# Patient Record
Sex: FEMALE | Race: WHITE | NOT HISPANIC OR LATINO
[De-identification: names, ages, dates, MRNs, and addresses within clinical notes are randomized per-mention and may not be internally consistent; named-entity substitution may affect disease eponyms.]

---

## 2017-09-05 ENCOUNTER — APPOINTMENT (OUTPATIENT)
Dept: PLASTIC SURGERY | Facility: CLINIC | Age: 52
End: 2017-09-05

## 2018-01-18 ENCOUNTER — APPOINTMENT (OUTPATIENT)
Dept: PLASTIC SURGERY | Facility: CLINIC | Age: 53
End: 2018-01-18
Payer: COMMERCIAL

## 2018-01-18 VITALS — BODY MASS INDEX: 24.84 KG/M2 | HEIGHT: 62 IN | WEIGHT: 135 LBS

## 2018-01-18 DIAGNOSIS — Z87.19 PERSONAL HISTORY OF OTHER DISEASES OF THE DIGESTIVE SYSTEM: ICD-10-CM

## 2018-01-18 PROCEDURE — 99203 OFFICE O/P NEW LOW 30 MIN: CPT

## 2018-01-19 ENCOUNTER — OUTPATIENT (OUTPATIENT)
Dept: OUTPATIENT SERVICES | Facility: HOSPITAL | Age: 53
LOS: 1 days | Discharge: HOME | End: 2018-01-19

## 2018-01-19 DIAGNOSIS — K57.20 DIVERTICULITIS OF LARGE INTESTINE WITH PERFORATION AND ABSCESS WITHOUT BLEEDING: ICD-10-CM

## 2018-01-19 DIAGNOSIS — T80.211A BLOODSTREAM INFECTION DUE TO CENTRAL VENOUS CATHETER, INITIAL ENCOUNTER: ICD-10-CM

## 2018-01-19 DIAGNOSIS — M67.449 GANGLION, UNSPECIFIED HAND: ICD-10-CM

## 2018-01-29 ENCOUNTER — OUTPATIENT (OUTPATIENT)
Dept: OUTPATIENT SERVICES | Facility: HOSPITAL | Age: 53
LOS: 1 days | Discharge: HOME | End: 2018-01-29

## 2018-01-29 DIAGNOSIS — M67.941 UNSPECIFIED DISORDER OF SYNOVIUM AND TENDON, RIGHT HAND: ICD-10-CM

## 2018-01-29 DIAGNOSIS — Z01.818 ENCOUNTER FOR OTHER PREPROCEDURAL EXAMINATION: ICD-10-CM

## 2018-02-04 DIAGNOSIS — K57.20 DIVERTICULITIS OF LARGE INTESTINE WITH PERFORATION AND ABSCESS WITHOUT BLEEDING: ICD-10-CM

## 2018-02-04 DIAGNOSIS — T80.211A BLOODSTREAM INFECTION DUE TO CENTRAL VENOUS CATHETER, INITIAL ENCOUNTER: ICD-10-CM

## 2018-02-06 ENCOUNTER — RESULT REVIEW (OUTPATIENT)
Age: 53
End: 2018-02-06

## 2018-02-06 ENCOUNTER — OUTPATIENT (OUTPATIENT)
Dept: OUTPATIENT SERVICES | Facility: HOSPITAL | Age: 53
LOS: 1 days | Discharge: HOME | End: 2018-02-06

## 2018-02-06 ENCOUNTER — APPOINTMENT (OUTPATIENT)
Dept: PLASTIC SURGERY | Facility: CLINIC | Age: 53
End: 2018-02-06
Payer: COMMERCIAL

## 2018-02-06 VITALS
HEIGHT: 62 IN | TEMPERATURE: 98 F | WEIGHT: 134.92 LBS | SYSTOLIC BLOOD PRESSURE: 93 MMHG | OXYGEN SATURATION: 97 % | HEART RATE: 82 BPM | RESPIRATION RATE: 20 BRPM | DIASTOLIC BLOOD PRESSURE: 52 MMHG

## 2018-02-06 VITALS
OXYGEN SATURATION: 97 % | HEART RATE: 76 BPM | SYSTOLIC BLOOD PRESSURE: 105 MMHG | RESPIRATION RATE: 25 BRPM | DIASTOLIC BLOOD PRESSURE: 77 MMHG

## 2018-02-06 DIAGNOSIS — Z98.890 OTHER SPECIFIED POSTPROCEDURAL STATES: Chronic | ICD-10-CM

## 2018-02-06 DIAGNOSIS — L72.3 SEBACEOUS CYST: Chronic | ICD-10-CM

## 2018-02-06 PROCEDURE — 26160 REMOVE TENDON SHEATH LESION: CPT

## 2018-02-06 RX ORDER — SODIUM CHLORIDE 9 MG/ML
1000 INJECTION, SOLUTION INTRAVENOUS
Qty: 0 | Refills: 0 | Status: DISCONTINUED | OUTPATIENT
Start: 2018-02-06 | End: 2018-02-21

## 2018-02-06 RX ORDER — HYDROMORPHONE HYDROCHLORIDE 2 MG/ML
0.25 INJECTION INTRAMUSCULAR; INTRAVENOUS; SUBCUTANEOUS
Qty: 0 | Refills: 0 | Status: DISCONTINUED | OUTPATIENT
Start: 2018-02-06 | End: 2018-02-06

## 2018-02-06 RX ORDER — ACETAMINOPHEN 500 MG
650 TABLET ORAL ONCE
Qty: 0 | Refills: 0 | Status: COMPLETED | OUTPATIENT
Start: 2018-02-06 | End: 2018-02-06

## 2018-02-06 RX ORDER — OXYCODONE AND ACETAMINOPHEN 5; 325 MG/1; MG/1
1 TABLET ORAL ONCE
Qty: 0 | Refills: 0 | Status: DISCONTINUED | OUTPATIENT
Start: 2018-02-06 | End: 2018-02-06

## 2018-02-06 RX ADMIN — Medication 650 MILLIGRAM(S): at 13:57

## 2018-02-06 NOTE — BRIEF OPERATIVE NOTE - PROCEDURE
<<-----Click on this checkbox to enter Procedure Excision of mucous cyst of right little finger  02/06/2018    Active  CPATE1

## 2018-02-06 NOTE — ASU DISCHARGE PLAN (ADULT/PEDIATRIC). - NOTIFY
Pain not relieved by Medications/Inability to Tolerate Liquids or Foods/Fever greater than 101/Bleeding that does not stop/Unable to Urinate

## 2018-02-06 NOTE — ASU DISCHARGE PLAN (ADULT/PEDIATRIC). - NURSING INSTRUCTIONS
keep hand elevated.  keep dressing dry. take medications as directed.  Make f/u appointment with surgeon

## 2018-02-06 NOTE — ASU DISCHARGE PLAN (ADULT/PEDIATRIC). - FOLLOWUP APPOINTMENT CLINIC/PHYSICIAN
2/15/18 12:00pm [address] [facility], [address]  [facility] [address]

## 2018-02-06 NOTE — ASU DISCHARGE PLAN (ADULT/PEDIATRIC). - SPECIAL INSTRUCTIONS
Keep right hand elevated at all times to avoid swelling. Take Tylenol as needed for pain. If not well controlled, take Tramadol. Avoid Advil/Aleve/Motrin as it may increase bleeding/bruising. Do not get bandage wet. It will be changed at your first post-operative visit in 1 week.

## 2018-02-09 DIAGNOSIS — M67.441 GANGLION, RIGHT HAND: ICD-10-CM

## 2018-02-09 DIAGNOSIS — Z88.1 ALLERGY STATUS TO OTHER ANTIBIOTIC AGENTS STATUS: ICD-10-CM

## 2018-02-09 DIAGNOSIS — Z88.8 ALLERGY STATUS TO OTHER DRUGS, MEDICAMENTS AND BIOLOGICAL SUBSTANCES STATUS: ICD-10-CM

## 2018-02-09 DIAGNOSIS — Z88.0 ALLERGY STATUS TO PENICILLIN: ICD-10-CM

## 2018-02-09 LAB — SURGICAL PATHOLOGY STUDY: SIGNIFICANT CHANGE UP

## 2018-02-14 ENCOUNTER — APPOINTMENT (OUTPATIENT)
Dept: PLASTIC SURGERY | Facility: CLINIC | Age: 53
End: 2018-02-14
Payer: COMMERCIAL

## 2018-02-14 PROCEDURE — 99024 POSTOP FOLLOW-UP VISIT: CPT

## 2018-02-14 RX ORDER — MULTIVITAMIN
TABLET ORAL
Refills: 0 | Status: ACTIVE | COMMUNITY

## 2018-02-22 ENCOUNTER — APPOINTMENT (OUTPATIENT)
Dept: PLASTIC SURGERY | Facility: CLINIC | Age: 53
End: 2018-02-22
Payer: COMMERCIAL

## 2018-02-22 PROCEDURE — 99024 POSTOP FOLLOW-UP VISIT: CPT

## 2018-02-26 ENCOUNTER — APPOINTMENT (OUTPATIENT)
Dept: PLASTIC SURGERY | Facility: CLINIC | Age: 53
End: 2018-02-26
Payer: COMMERCIAL

## 2018-02-26 PROCEDURE — 99024 POSTOP FOLLOW-UP VISIT: CPT

## 2018-05-23 ENCOUNTER — APPOINTMENT (OUTPATIENT)
Dept: PLASTIC SURGERY | Facility: CLINIC | Age: 53
End: 2018-05-23

## 2018-05-31 ENCOUNTER — APPOINTMENT (OUTPATIENT)
Dept: PLASTIC SURGERY | Facility: CLINIC | Age: 53
End: 2018-05-31
Payer: COMMERCIAL

## 2018-05-31 DIAGNOSIS — M67.449 GANGLION, UNSPECIFIED HAND: ICD-10-CM

## 2018-05-31 PROCEDURE — 99213 OFFICE O/P EST LOW 20 MIN: CPT

## 2018-08-22 PROBLEM — K21.9 GASTRO-ESOPHAGEAL REFLUX DISEASE WITHOUT ESOPHAGITIS: Chronic | Status: ACTIVE | Noted: 2018-02-06

## 2018-08-22 PROBLEM — K57.92 DIVERTICULITIS OF INTESTINE, PART UNSPECIFIED, WITHOUT PERFORATION OR ABSCESS WITHOUT BLEEDING: Chronic | Status: ACTIVE | Noted: 2018-02-06

## 2018-08-22 PROBLEM — M19.90 UNSPECIFIED OSTEOARTHRITIS, UNSPECIFIED SITE: Chronic | Status: ACTIVE | Noted: 2018-02-06

## 2018-08-29 ENCOUNTER — APPOINTMENT (OUTPATIENT)
Dept: PLASTIC SURGERY | Facility: CLINIC | Age: 53
End: 2018-08-29

## 2019-03-20 ENCOUNTER — FORM ENCOUNTER (OUTPATIENT)
Age: 54
End: 2019-03-20

## 2019-09-11 ENCOUNTER — FORM ENCOUNTER (OUTPATIENT)
Age: 54
End: 2019-09-11

## 2019-09-16 ENCOUNTER — FORM ENCOUNTER (OUTPATIENT)
Age: 54
End: 2019-09-16

## 2020-09-21 ENCOUNTER — FORM ENCOUNTER (OUTPATIENT)
Age: 55
End: 2020-09-21

## 2020-09-23 ENCOUNTER — FORM ENCOUNTER (OUTPATIENT)
Age: 55
End: 2020-09-23

## 2021-05-11 DIAGNOSIS — A63.0 ANOGENITAL (VENEREAL) WARTS: ICD-10-CM

## 2021-05-11 DIAGNOSIS — Z87.42 PERSONAL HISTORY OF OTHER DISEASES OF THE FEMALE GENITAL TRACT: ICD-10-CM

## 2021-05-11 DIAGNOSIS — Z86.03 PERSONAL HISTORY OF NEOPLASM OF UNCERTAIN BEHAVIOR: ICD-10-CM

## 2021-05-11 DIAGNOSIS — Z91.89 OTHER SPECIFIED PERSONAL RISK FACTORS, NOT ELSEWHERE CLASSIFIED: ICD-10-CM

## 2021-05-11 DIAGNOSIS — Z63.5 DISRUPTION OF FAMILY BY SEPARATION AND DIVORCE: ICD-10-CM

## 2021-05-11 DIAGNOSIS — Z87.448 PERSONAL HISTORY OF OTHER DISEASES OF URINARY SYSTEM: ICD-10-CM

## 2021-05-11 DIAGNOSIS — D49.59 NEOPLASM UNSPECIFIED BEHAVIOR OF OTHER GENITOURINARY ORGAN: ICD-10-CM

## 2021-05-11 DIAGNOSIS — Z80.41 FAMILY HISTORY OF MALIGNANT NEOPLASM OF OVARY: ICD-10-CM

## 2021-05-11 DIAGNOSIS — N87.0 MILD CERVICAL DYSPLASIA: ICD-10-CM

## 2021-05-11 DIAGNOSIS — Z72.51 HIGH RISK HETEROSEXUAL BEHAVIOR: ICD-10-CM

## 2021-05-11 DIAGNOSIS — N76.2 ACUTE VULVITIS: ICD-10-CM

## 2021-05-11 DIAGNOSIS — K63.1 PERFORATION OF INTESTINE (NONTRAUMATIC): ICD-10-CM

## 2021-05-11 DIAGNOSIS — Z78.9 OTHER SPECIFIED HEALTH STATUS: ICD-10-CM

## 2021-05-11 DIAGNOSIS — Z87.891 PERSONAL HISTORY OF NICOTINE DEPENDENCE: ICD-10-CM

## 2021-05-11 LAB — CYTOLOGY CVX/VAG DOC THIN PREP: NEGATIVE

## 2021-05-11 SDOH — SOCIAL STABILITY - SOCIAL INSECURITY: DISRUPTION OF FAMILY BY SEPARATION AND DIVORCE: Z63.5

## 2021-05-13 ENCOUNTER — APPOINTMENT (OUTPATIENT)
Dept: OBGYN | Facility: CLINIC | Age: 56
End: 2021-05-13
Payer: COMMERCIAL

## 2021-05-13 VITALS — SYSTOLIC BLOOD PRESSURE: 112 MMHG | DIASTOLIC BLOOD PRESSURE: 83 MMHG

## 2021-05-13 VITALS — TEMPERATURE: 97.7 F | WEIGHT: 140 LBS | HEIGHT: 62 IN | BODY MASS INDEX: 25.76 KG/M2

## 2021-05-13 DIAGNOSIS — N90.7 VULVAR CYST: ICD-10-CM

## 2021-05-13 PROCEDURE — 99213 OFFICE O/P EST LOW 20 MIN: CPT

## 2021-05-13 PROCEDURE — 99072 ADDL SUPL MATRL&STAF TM PHE: CPT

## 2021-05-13 NOTE — PHYSICAL EXAM
[Labia Majora] : normal [Labia Minora] : normal [Normal] : normal [FreeTextEntry1] : small epidermoid cyst of left labia majora  1-2mm

## 2021-05-13 NOTE — HISTORY OF PRESENT ILLNESS
[FreeTextEntry1] : pt feels "bump" in genital area, has hx of condyloma and wants to make sure she doesn’t have new lesion [Currently In Menopause] : currently in menopause [Currently Active] : currently active [Men] : men [No] : No [FreeTextEntry2] : infrequently

## 2021-08-31 ENCOUNTER — NON-APPOINTMENT (OUTPATIENT)
Age: 56
End: 2021-08-31

## 2021-09-28 ENCOUNTER — NON-APPOINTMENT (OUTPATIENT)
Age: 56
End: 2021-09-28

## 2021-09-28 ENCOUNTER — APPOINTMENT (OUTPATIENT)
Dept: OBGYN | Facility: CLINIC | Age: 56
End: 2021-09-28

## 2021-10-04 ENCOUNTER — NON-APPOINTMENT (OUTPATIENT)
Age: 56
End: 2021-10-04

## 2021-10-04 ENCOUNTER — APPOINTMENT (OUTPATIENT)
Dept: OBGYN | Facility: CLINIC | Age: 56
End: 2021-10-04
Payer: COMMERCIAL

## 2021-10-04 VITALS
BODY MASS INDEX: 25.76 KG/M2 | HEIGHT: 62 IN | HEART RATE: 83 BPM | SYSTOLIC BLOOD PRESSURE: 121 MMHG | WEIGHT: 140 LBS | DIASTOLIC BLOOD PRESSURE: 80 MMHG | TEMPERATURE: 98.1 F

## 2021-10-04 DIAGNOSIS — Z01.419 ENCOUNTER FOR GYNECOLOGICAL EXAMINATION (GENERAL) (ROUTINE) W/OUT ABNORMAL FINDINGS: ICD-10-CM

## 2021-10-04 LAB
BILIRUB UR QL STRIP: NORMAL
CLARITY UR: CLEAR
COLLECTION METHOD: NORMAL
GLUCOSE UR-MCNC: NORMAL
HCG UR QL: 0.2 EU/DL
HGB UR QL STRIP.AUTO: NORMAL
KETONES UR-MCNC: NORMAL
LEUKOCYTE ESTERASE UR QL STRIP: NORMAL
NITRITE UR QL STRIP: NORMAL
PH UR STRIP: 5.5
PROT UR STRIP-MCNC: NORMAL
SP GR UR STRIP: 1.03

## 2021-10-04 PROCEDURE — 81003 URINALYSIS AUTO W/O SCOPE: CPT | Mod: QW

## 2021-10-04 PROCEDURE — 99396 PREV VISIT EST AGE 40-64: CPT

## 2021-10-04 NOTE — PHYSICAL EXAM
[Examination Of The Breasts] : a normal appearance [No Masses] : no breast masses were palpable [Labia Majora] : normal [Labia Minora] : normal [Normal] : normal [Retroversion] : retroverted [Uterine Adnexae] : normal

## 2021-10-04 NOTE — HISTORY OF PRESENT ILLNESS
[Menopause Age: ____] : age at menopause was [unfilled] [postmenopausal] : postmenopausal [N] : Patient does not use contraception [Y] : Patient is sexually active [Currently In Menopause] : currently in menopause [Yes] : Patient has concerns regarding sex [FreeTextEntry1] : 2019

## 2021-10-08 ENCOUNTER — NON-APPOINTMENT (OUTPATIENT)
Age: 56
End: 2021-10-08

## 2021-10-12 LAB
C TRACH RRNA SPEC QL NAA+PROBE: NOT DETECTED
HPV HIGH+LOW RISK DNA PNL CVX: NOT DETECTED
N GONORRHOEA RRNA SPEC QL NAA+PROBE: NOT DETECTED
SOURCE AMPLIFICATION: NORMAL
SOURCE AMPLIFICATION: NORMAL
T VAGINALIS RRNA SPEC QL NAA+PROBE: NOT DETECTED

## 2021-10-13 ENCOUNTER — NON-APPOINTMENT (OUTPATIENT)
Age: 56
End: 2021-10-13

## 2021-10-14 LAB — CYTOLOGY CVX/VAG DOC THIN PREP: NORMAL

## 2021-10-22 ENCOUNTER — NON-APPOINTMENT (OUTPATIENT)
Age: 56
End: 2021-10-22

## 2022-02-07 ENCOUNTER — APPOINTMENT (OUTPATIENT)
Dept: OBGYN | Facility: CLINIC | Age: 57
End: 2022-02-07
Payer: COMMERCIAL

## 2022-02-07 ENCOUNTER — NON-APPOINTMENT (OUTPATIENT)
Age: 57
End: 2022-02-07

## 2022-02-07 VITALS
SYSTOLIC BLOOD PRESSURE: 114 MMHG | HEART RATE: 94 BPM | BODY MASS INDEX: 25.76 KG/M2 | HEIGHT: 62 IN | WEIGHT: 140 LBS | DIASTOLIC BLOOD PRESSURE: 83 MMHG

## 2022-02-07 LAB
BILIRUB UR QL STRIP: NORMAL
CLARITY UR: CLEAR
COLLECTION METHOD: NORMAL
GLUCOSE UR-MCNC: NORMAL
HCG UR QL: 0.2 EU/DL
HGB UR QL STRIP.AUTO: NORMAL
KETONES UR-MCNC: NORMAL
LEUKOCYTE ESTERASE UR QL STRIP: NORMAL
NITRITE UR QL STRIP: NORMAL
PH UR STRIP: 7
PROT UR STRIP-MCNC: 100
SP GR UR STRIP: 1.03

## 2022-02-07 PROCEDURE — 81003 URINALYSIS AUTO W/O SCOPE: CPT | Mod: QW

## 2022-02-07 PROCEDURE — 99213 OFFICE O/P EST LOW 20 MIN: CPT

## 2022-02-07 NOTE — PHYSICAL EXAM
[Chaperone Present] : A chaperone was present in the examining room during all aspects of the physical examination [Normal] : uterus [No Bleeding] : there was no active vaginal bleeding [Uterine Adnexae] : were not tender and not enlarged

## 2022-02-07 NOTE — HISTORY OF PRESENT ILLNESS
[Good] : being in good health [Postmenopausal] : is postmenopausal [Currently In Menopause] : currently in menopause [Experiencing Menopausal Sxs] : not experiencing menopausal symptoms [Sexually Active] : is not sexually active

## 2022-02-07 NOTE — CHIEF COMPLAINT
[Urgent Visit] : Urgent Visit [FreeTextEntry1] : pt started to feel symptoms of UTI, became worse today\par saw some blood in urine, (pink) after urinating

## 2022-02-08 ENCOUNTER — NON-APPOINTMENT (OUTPATIENT)
Age: 57
End: 2022-02-08

## 2022-02-13 LAB
APPEARANCE: ABNORMAL
BACTERIA UR CULT: ABNORMAL
BACTERIA: ABNORMAL
BILIRUBIN URINE: NEGATIVE
BLOOD URINE: ABNORMAL
CALCIUM OXALATE CRYSTALS: ABNORMAL
COLOR: YELLOW
GLUCOSE QUALITATIVE U: NEGATIVE
HYALINE CASTS: 0 /LPF
KETONES URINE: NEGATIVE
LEUKOCYTE ESTERASE URINE: ABNORMAL
MICROSCOPIC-UA: NORMAL
NITRITE URINE: NEGATIVE
PH URINE: 9
PROTEIN URINE: ABNORMAL
RED BLOOD CELLS URINE: 2 /HPF
SPECIFIC GRAVITY URINE: 1.03
SQUAMOUS EPITHELIAL CELLS: 1 /HPF
TRIPLE PHOSPHATE CRYSTALS: ABNORMAL
UROBILINOGEN URINE: NORMAL
WHITE BLOOD CELLS URINE: 20 /HPF

## 2022-02-14 ENCOUNTER — NON-APPOINTMENT (OUTPATIENT)
Age: 57
End: 2022-02-14

## 2022-02-16 ENCOUNTER — NON-APPOINTMENT (OUTPATIENT)
Age: 57
End: 2022-02-16

## 2022-02-17 ENCOUNTER — APPOINTMENT (OUTPATIENT)
Dept: OBGYN | Facility: CLINIC | Age: 57
End: 2022-02-17
Payer: COMMERCIAL

## 2022-02-17 VITALS — TEMPERATURE: 97.9 F | BODY MASS INDEX: 26.68 KG/M2 | HEIGHT: 62 IN | WEIGHT: 145 LBS

## 2022-02-17 VITALS — DIASTOLIC BLOOD PRESSURE: 81 MMHG | HEART RATE: 98 BPM | SYSTOLIC BLOOD PRESSURE: 103 MMHG

## 2022-02-17 LAB
BILIRUB UR QL STRIP: NORMAL
CLARITY UR: NORMAL
COLLECTION METHOD: NORMAL
GLUCOSE UR-MCNC: NORMAL
HCG UR QL: 0.2 EU/DL
HGB UR QL STRIP.AUTO: NORMAL
KETONES UR-MCNC: NORMAL
LEUKOCYTE ESTERASE UR QL STRIP: NORMAL
NITRITE UR QL STRIP: NORMAL
PH UR STRIP: 6
PROT UR STRIP-MCNC: NORMAL
SP GR UR STRIP: 1.02

## 2022-02-17 PROCEDURE — 99213 OFFICE O/P EST LOW 20 MIN: CPT | Mod: 25

## 2022-02-17 PROCEDURE — 81003 URINALYSIS AUTO W/O SCOPE: CPT | Mod: QW

## 2022-02-17 PROCEDURE — 76830 TRANSVAGINAL US NON-OB: CPT

## 2022-02-17 NOTE — DISCUSSION/SUMMARY
[FreeTextEntry1] : finish antibiotics for UTI\par ca125 less than 1 yr ago was wnl, pt is very nervous about ovarian cancer because of her mothers hx \par

## 2022-02-17 NOTE — HISTORY OF PRESENT ILLNESS
[FreeTextEntry1] : pt presents with pelvic pain since monday, she was seen on this same day , diagnosed with UTI\par (+) culture, currently on day 4 of medication\par pt stating that the pain feels different and is more in middle of abdomen\par has hx of "perforated colon" 2012\par has an appointment to see Dr Williamson today at 7pm\par

## 2022-02-17 NOTE — PHYSICAL EXAM
[Chaperone Present] : A chaperone was present in the examining room during all aspects of the physical examination [Appropriately responsive] : appropriately responsive [Alert] : alert [No Acute Distress] : no acute distress [Soft] : soft [Non-distended] : non-distended [No Lesions] : no lesions [No Mass] : no mass [Oriented x3] : oriented x3 [FreeTextEntry7] : tender to deep palpation rt middle quad, no rebound [Vulvar Atrophy] : vulvar atrophy [Labia Majora] : normal [Labia Minora] : normal [Atrophy] : atrophy [Normal] : normal [Retroversion] : retroverted [Uterine Adnexae] : normal

## 2022-02-17 NOTE — PROCEDURE
[Pelvic Pain] : pelvic pain [Transvaginal Ultrasound] : transvaginal ultrasound [Retroverted] : retroverted [FreeTextEntry5] : 16.34cc  vol  lining 1mm [FreeTextEntry7] : 1.4 x 0.9 x 0.8cm [FreeTextEntry8] : 1.7 x 0.9x1.2cm

## 2022-04-26 ENCOUNTER — NON-APPOINTMENT (OUTPATIENT)
Age: 57
End: 2022-04-26

## 2022-05-02 ENCOUNTER — APPOINTMENT (OUTPATIENT)
Dept: OBGYN | Facility: CLINIC | Age: 57
End: 2022-05-02
Payer: COMMERCIAL

## 2022-05-02 VITALS
DIASTOLIC BLOOD PRESSURE: 75 MMHG | BODY MASS INDEX: 25.76 KG/M2 | SYSTOLIC BLOOD PRESSURE: 116 MMHG | WEIGHT: 140 LBS | HEIGHT: 62 IN

## 2022-05-02 DIAGNOSIS — N81.11 CYSTOCELE, MIDLINE: ICD-10-CM

## 2022-05-02 DIAGNOSIS — N39.0 URINARY TRACT INFECTION, SITE NOT SPECIFIED: ICD-10-CM

## 2022-05-02 PROCEDURE — 81003 URINALYSIS AUTO W/O SCOPE: CPT | Mod: QW

## 2022-05-02 PROCEDURE — 99213 OFFICE O/P EST LOW 20 MIN: CPT

## 2022-05-02 NOTE — HISTORY OF PRESENT ILLNESS
[postmenopausal] : postmenopausal [N] : Patient does not use contraception [Y] : Patient is sexually active [Currently In Menopause] : currently in menopause [Menopause Age: ____] : age at menopause was [unfilled] [FreeTextEntry1] : 2019 [Yes] : Patient has concerns regarding sex [Previously active] : previously active

## 2022-05-02 NOTE — DISCUSSION/SUMMARY
[FreeTextEntry1] : discussed findings of genital prolapse, pt doesn’t want to have a surgical procedure
ST 2xs/week

## 2022-05-02 NOTE — PHYSICAL EXAM
[Chaperone Present] : A chaperone was present in the examining room during all aspects of the physical examination [Appropriately responsive] : appropriately responsive [Alert] : alert [No Acute Distress] : no acute distress [Soft] : soft [Non-tender] : non-tender [Non-distended] : non-distended [Oriented x3] : oriented x3 [Labia Majora] : normal [Labia Minora] : normal [Cystocele] : a cystocele [Uterine Prolapse] : uterine prolapse [No Bleeding] : There was no active vaginal bleeding [Normal] : normal [Uterine Adnexae] : normal

## 2022-05-03 LAB
BILIRUB UR QL STRIP: NEGATIVE
CLARITY UR: CLEAR
COLLECTION METHOD: NORMAL
GLUCOSE UR-MCNC: NEGATIVE
HCG UR QL: 0.2 EU/DL
HGB UR QL STRIP.AUTO: NEGATIVE
KETONES UR-MCNC: NEGATIVE
LEUKOCYTE ESTERASE UR QL STRIP: NORMAL
NITRITE UR QL STRIP: POSITIVE
PH UR STRIP: 5.5
PROT UR STRIP-MCNC: NEGATIVE
SP GR UR STRIP: 1.03

## 2022-05-09 ENCOUNTER — NON-APPOINTMENT (OUTPATIENT)
Age: 57
End: 2022-05-09

## 2022-05-09 LAB — BACTERIA UR CULT: ABNORMAL

## 2022-11-07 DIAGNOSIS — Z12.39 ENCOUNTER FOR OTHER SCREENING FOR MALIGNANT NEOPLASM OF BREAST: ICD-10-CM

## 2022-11-07 DIAGNOSIS — R92.2 INCONCLUSIVE MAMMOGRAM: ICD-10-CM

## 2022-11-14 ENCOUNTER — NON-APPOINTMENT (OUTPATIENT)
Age: 57
End: 2022-11-14

## 2022-11-14 ENCOUNTER — APPOINTMENT (OUTPATIENT)
Dept: OBGYN | Facility: CLINIC | Age: 57
End: 2022-11-14

## 2022-11-14 VITALS
SYSTOLIC BLOOD PRESSURE: 124 MMHG | BODY MASS INDEX: 25.76 KG/M2 | DIASTOLIC BLOOD PRESSURE: 79 MMHG | WEIGHT: 140 LBS | HEART RATE: 75 BPM | HEIGHT: 62 IN

## 2022-11-14 LAB
BILIRUB UR QL STRIP: NORMAL
CLARITY UR: CLEAR
COLLECTION METHOD: NORMAL
GLUCOSE UR-MCNC: NORMAL
HCG UR QL: 0.2 EU/DL
HGB UR QL STRIP.AUTO: NORMAL
KETONES UR-MCNC: NORMAL
LEUKOCYTE ESTERASE UR QL STRIP: NORMAL
NITRITE UR QL STRIP: NORMAL
PH UR STRIP: 5
PROT UR STRIP-MCNC: NORMAL
SP GR UR STRIP: 1.03

## 2022-11-14 PROCEDURE — 81003 URINALYSIS AUTO W/O SCOPE: CPT | Mod: QW

## 2022-11-14 PROCEDURE — 99396 PREV VISIT EST AGE 40-64: CPT

## 2022-11-14 RX ORDER — TRAMADOL HYDROCHLORIDE 50 MG/1
50 TABLET, COATED ORAL
Qty: 10 | Refills: 0 | Status: COMPLETED | COMMUNITY
Start: 2018-02-06 | End: 2022-11-14

## 2022-11-14 NOTE — PHYSICAL EXAM
[Chaperone Present] : A chaperone was present in the examining room during all aspects of the physical examination [Appropriately responsive] : appropriately responsive [Alert] : alert [No Acute Distress] : no acute distress [Soft] : soft [Non-tender] : non-tender [Non-distended] : non-distended [Oriented x3] : oriented x3 [Examination Of The Breasts] : a normal appearance [No Masses] : no breast masses were palpable [Labia Majora] : normal [Labia Minora] : normal [Cystocele] : a cystocele [Uterine Prolapse] : uterine prolapse [No Bleeding] : There was no active vaginal bleeding [Normal] : normal [Uterine Adnexae] : normal

## 2022-11-14 NOTE — HISTORY OF PRESENT ILLNESS
[postmenopausal] : postmenopausal [N] : Patient does not use contraception [Y] : Positive pregnancy history [LMPDate] : 2019 [Currently In Menopause] : currently in menopause [Menopause Age: ____] : age at menopause was [unfilled] [FreeTextEntry1] : 2019 [No] : Patient does not have concerns regarding sex [Previously active] : previously active

## 2022-11-16 LAB
C TRACH RRNA SPEC QL NAA+PROBE: NOT DETECTED
HPV HIGH+LOW RISK DNA PNL CVX: NOT DETECTED
N GONORRHOEA RRNA SPEC QL NAA+PROBE: NOT DETECTED
SOURCE AMPLIFICATION: NORMAL

## 2022-11-25 LAB — CYTOLOGY CVX/VAG DOC THIN PREP: NORMAL

## 2022-12-01 ENCOUNTER — APPOINTMENT (OUTPATIENT)
Dept: GASTROENTEROLOGY | Facility: CLINIC | Age: 57
End: 2022-12-01

## 2022-12-01 DIAGNOSIS — Z12.11 ENCOUNTER FOR SCREENING FOR MALIGNANT NEOPLASM OF COLON: ICD-10-CM

## 2022-12-01 DIAGNOSIS — K57.30 DIVERTICULOSIS OF LARGE INTESTINE W/OUT PERFORATION OR ABSCESS W/OUT BLEEDING: ICD-10-CM

## 2022-12-01 DIAGNOSIS — K57.20 DIVERTICULITIS OF LARGE INTESTINE WITH PERFORATION AND ABSCESS W/OUT BLEEDING: ICD-10-CM

## 2022-12-01 PROCEDURE — 99203 OFFICE O/P NEW LOW 30 MIN: CPT | Mod: 95

## 2022-12-06 PROBLEM — Z12.11 COLON CANCER SCREENING: Status: ACTIVE | Noted: 2022-12-06

## 2022-12-06 PROBLEM — K57.20 DIVERTICULITIS OF COLON WITH PERFORATION: Status: ACTIVE | Noted: 2022-12-01

## 2022-12-06 PROBLEM — K57.30 DIVERTICULOSIS OF COLON: Status: ACTIVE | Noted: 2022-12-01

## 2022-12-06 NOTE — PHYSICAL EXAM
[Normal] : alert, normal voice/communication, healthy appearing, no acute distress [Sclera] : the sclera and conjunctiva were normal [Hearing Threshold Finger Rub Not [place]] : hearing was normal [Normal Lips/Gums] : the lips and gums were normal [Normal Appearance] : the appearance of the neck was normal [No Respiratory Distress] : no respiratory distress [No Acc Muscle Use] : no accessory muscle use [Normal Color / Pigmentation] : normal skin color and pigmentation [Oriented To Time, Place, And Person] : oriented to person, place, and time

## 2023-01-06 ENCOUNTER — LABORATORY RESULT (OUTPATIENT)
Age: 58
End: 2023-01-06

## 2023-01-20 ENCOUNTER — INPATIENT (INPATIENT)
Facility: HOSPITAL | Age: 58
LOS: 6 days | Discharge: HOME | End: 2023-01-27
Attending: INTERNAL MEDICINE | Admitting: INTERNAL MEDICINE
Payer: COMMERCIAL

## 2023-01-20 VITALS
DIASTOLIC BLOOD PRESSURE: 66 MMHG | OXYGEN SATURATION: 97 % | HEART RATE: 108 BPM | RESPIRATION RATE: 22 BRPM | TEMPERATURE: 99 F | SYSTOLIC BLOOD PRESSURE: 137 MMHG | HEIGHT: 63 IN | WEIGHT: 139.99 LBS

## 2023-01-20 DIAGNOSIS — Z98.890 OTHER SPECIFIED POSTPROCEDURAL STATES: Chronic | ICD-10-CM

## 2023-01-20 DIAGNOSIS — L72.3 SEBACEOUS CYST: Chronic | ICD-10-CM

## 2023-01-20 LAB
ALBUMIN SERPL ELPH-MCNC: 3.5 G/DL — SIGNIFICANT CHANGE UP (ref 3.5–5.2)
ALP SERPL-CCNC: 84 U/L — SIGNIFICANT CHANGE UP (ref 30–115)
ALT FLD-CCNC: 14 U/L — SIGNIFICANT CHANGE UP (ref 0–41)
ANION GAP SERPL CALC-SCNC: 13 MMOL/L — SIGNIFICANT CHANGE UP (ref 7–14)
APPEARANCE UR: CLEAR — SIGNIFICANT CHANGE UP
AST SERPL-CCNC: 15 U/L — SIGNIFICANT CHANGE UP (ref 0–41)
BACTERIA # UR AUTO: NEGATIVE — SIGNIFICANT CHANGE UP
BASE EXCESS BLDV CALC-SCNC: -0.8 MMOL/L — SIGNIFICANT CHANGE UP (ref -2–3)
BASOPHILS # BLD AUTO: 0.03 K/UL — SIGNIFICANT CHANGE UP (ref 0–0.2)
BASOPHILS NFR BLD AUTO: 0.2 % — SIGNIFICANT CHANGE UP (ref 0–1)
BILIRUB SERPL-MCNC: 0.9 MG/DL — SIGNIFICANT CHANGE UP (ref 0.2–1.2)
BILIRUB UR-MCNC: NEGATIVE — SIGNIFICANT CHANGE UP
BUN SERPL-MCNC: 16 MG/DL — SIGNIFICANT CHANGE UP (ref 10–20)
CA-I SERPL-SCNC: 1.19 MMOL/L — SIGNIFICANT CHANGE UP (ref 1.15–1.33)
CALCIUM SERPL-MCNC: 8.8 MG/DL — SIGNIFICANT CHANGE UP (ref 8.4–10.5)
CHLORIDE SERPL-SCNC: 103 MMOL/L — SIGNIFICANT CHANGE UP (ref 98–110)
CO2 SERPL-SCNC: 23 MMOL/L — SIGNIFICANT CHANGE UP (ref 17–32)
COLOR SPEC: YELLOW — SIGNIFICANT CHANGE UP
CREAT SERPL-MCNC: 0.7 MG/DL — SIGNIFICANT CHANGE UP (ref 0.7–1.5)
DIFF PNL FLD: NEGATIVE — SIGNIFICANT CHANGE UP
EGFR: 100 ML/MIN/1.73M2 — SIGNIFICANT CHANGE UP
EOSINOPHIL # BLD AUTO: 0.09 K/UL — SIGNIFICANT CHANGE UP (ref 0–0.7)
EOSINOPHIL NFR BLD AUTO: 0.7 % — SIGNIFICANT CHANGE UP (ref 0–8)
EPI CELLS # UR: 8 /HPF — HIGH (ref 0–5)
GAS PNL BLDV: 135 MMOL/L — LOW (ref 136–145)
GAS PNL BLDV: SIGNIFICANT CHANGE UP
GLUCOSE SERPL-MCNC: 124 MG/DL — HIGH (ref 70–99)
GLUCOSE UR QL: NEGATIVE — SIGNIFICANT CHANGE UP
HCO3 BLDV-SCNC: 25 MMOL/L — SIGNIFICANT CHANGE UP (ref 22–29)
HCT VFR BLD CALC: 39.6 % — SIGNIFICANT CHANGE UP (ref 37–47)
HCT VFR BLDA CALC: 41 % — SIGNIFICANT CHANGE UP (ref 39–51)
HGB BLD CALC-MCNC: 13.7 G/DL — SIGNIFICANT CHANGE UP (ref 12.6–17.4)
HGB BLD-MCNC: 13.2 G/DL — SIGNIFICANT CHANGE UP (ref 12–16)
HYALINE CASTS # UR AUTO: 19 /LPF — HIGH (ref 0–7)
IMM GRANULOCYTES NFR BLD AUTO: 0.5 % — HIGH (ref 0.1–0.3)
KETONES UR-MCNC: ABNORMAL
LACTATE BLDV-MCNC: 1.2 MMOL/L — SIGNIFICANT CHANGE UP (ref 0.5–2)
LACTATE SERPL-SCNC: 1.1 MMOL/L — SIGNIFICANT CHANGE UP (ref 0.7–2)
LEUKOCYTE ESTERASE UR-ACNC: NEGATIVE — SIGNIFICANT CHANGE UP
LIDOCAIN IGE QN: 8 U/L — SIGNIFICANT CHANGE UP (ref 7–60)
LYMPHOCYTES # BLD AUTO: 0.77 K/UL — LOW (ref 1.2–3.4)
LYMPHOCYTES # BLD AUTO: 6.1 % — LOW (ref 20.5–51.1)
MCHC RBC-ENTMCNC: 30.3 PG — SIGNIFICANT CHANGE UP (ref 27–31)
MCHC RBC-ENTMCNC: 33.3 G/DL — SIGNIFICANT CHANGE UP (ref 32–37)
MCV RBC AUTO: 91 FL — SIGNIFICANT CHANGE UP (ref 81–99)
MONOCYTES # BLD AUTO: 1.17 K/UL — HIGH (ref 0.1–0.6)
MONOCYTES NFR BLD AUTO: 9.3 % — SIGNIFICANT CHANGE UP (ref 1.7–9.3)
NEUTROPHILS # BLD AUTO: 10.52 K/UL — HIGH (ref 1.4–6.5)
NEUTROPHILS NFR BLD AUTO: 83.2 % — HIGH (ref 42.2–75.2)
NITRITE UR-MCNC: NEGATIVE — SIGNIFICANT CHANGE UP
NRBC # BLD: 0 /100 WBCS — SIGNIFICANT CHANGE UP (ref 0–0)
PCO2 BLDV: 44 MMHG — HIGH (ref 39–42)
PH BLDV: 7.36 — SIGNIFICANT CHANGE UP (ref 7.32–7.43)
PH UR: 7 — SIGNIFICANT CHANGE UP (ref 5–8)
PLATELET # BLD AUTO: 200 K/UL — SIGNIFICANT CHANGE UP (ref 130–400)
PO2 BLDV: 34 MMHG — SIGNIFICANT CHANGE UP
POTASSIUM BLDV-SCNC: 3.3 MMOL/L — LOW (ref 3.5–5.1)
POTASSIUM SERPL-MCNC: 3.6 MMOL/L — SIGNIFICANT CHANGE UP (ref 3.5–5)
POTASSIUM SERPL-SCNC: 3.6 MMOL/L — SIGNIFICANT CHANGE UP (ref 3.5–5)
PROT SERPL-MCNC: 5.6 G/DL — LOW (ref 6–8)
PROT UR-MCNC: ABNORMAL
RBC # BLD: 4.35 M/UL — SIGNIFICANT CHANGE UP (ref 4.2–5.4)
RBC # FLD: 12.6 % — SIGNIFICANT CHANGE UP (ref 11.5–14.5)
RBC CASTS # UR COMP ASSIST: 6 /HPF — HIGH (ref 0–4)
SAO2 % BLDV: 50.3 % — SIGNIFICANT CHANGE UP
SARS-COV-2 RNA SPEC QL NAA+PROBE: SIGNIFICANT CHANGE UP
SODIUM SERPL-SCNC: 139 MMOL/L — SIGNIFICANT CHANGE UP (ref 135–146)
SP GR SPEC: >1.05 (ref 1.01–1.03)
TROPONIN T SERPL-MCNC: <0.01 NG/ML — SIGNIFICANT CHANGE UP
UROBILINOGEN FLD QL: SIGNIFICANT CHANGE UP
WBC # BLD: 12.64 K/UL — HIGH (ref 4.8–10.8)
WBC # FLD AUTO: 12.64 K/UL — HIGH (ref 4.8–10.8)
WBC UR QL: 1 /HPF — SIGNIFICANT CHANGE UP (ref 0–5)

## 2023-01-20 PROCEDURE — 74177 CT ABD & PELVIS W/CONTRAST: CPT | Mod: 26,MA

## 2023-01-20 PROCEDURE — 99285 EMERGENCY DEPT VISIT HI MDM: CPT

## 2023-01-20 RX ORDER — SODIUM CHLORIDE 9 MG/ML
1000 INJECTION INTRAMUSCULAR; INTRAVENOUS; SUBCUTANEOUS ONCE
Refills: 0 | Status: COMPLETED | OUTPATIENT
Start: 2023-01-20 | End: 2023-01-20

## 2023-01-20 RX ORDER — METRONIDAZOLE 500 MG
500 TABLET ORAL ONCE
Refills: 0 | Status: COMPLETED | OUTPATIENT
Start: 2023-01-20 | End: 2023-01-20

## 2023-01-20 RX ORDER — CIPROFLOXACIN LACTATE 400MG/40ML
400 VIAL (ML) INTRAVENOUS ONCE
Refills: 0 | Status: COMPLETED | OUTPATIENT
Start: 2023-01-20 | End: 2023-01-20

## 2023-01-20 RX ORDER — ONDANSETRON 8 MG/1
4 TABLET, FILM COATED ORAL ONCE
Refills: 0 | Status: COMPLETED | OUTPATIENT
Start: 2023-01-20 | End: 2023-01-20

## 2023-01-20 RX ADMIN — Medication 200 MILLIGRAM(S): at 22:50

## 2023-01-20 RX ADMIN — ONDANSETRON 4 MILLIGRAM(S): 8 TABLET, FILM COATED ORAL at 21:13

## 2023-01-20 RX ADMIN — Medication 100 MILLIGRAM(S): at 21:59

## 2023-01-20 RX ADMIN — SODIUM CHLORIDE 1000 MILLILITER(S): 9 INJECTION INTRAMUSCULAR; INTRAVENOUS; SUBCUTANEOUS at 20:12

## 2023-01-20 NOTE — ED ADULT TRIAGE NOTE - ARRIVAL FROM
Pt attending and participating in unit groups/activities. Patient was upset earlier regarding request for bra. See previous notes. Patient denies SI/SIB. Patient did endorsed wanted to hurt people during incident earlier. Patient has been fine since seclusion, no aggressive behavior was noted or reported since. No aggression toward other peers.    AVH: Patient endorsed visual hallucination, told writer that it is normal for her and has been happening since she was little, denied auditory hallucinations.    Sleep: patient reported no issue with sleep last night    PRN: patient requested medication for sleep, melatonin was administered around 2200    Medication AE:n none observed or reported    Pain: continues to deny pain    Appetite: Patient is eating and drinking well    LBM: Patient reported no BM and has no GI concerns    ADLs: Independent, patient took shower this shift    Vitals:  WNL    Problem: Behavior Regulation Impairment (Disruptive Behavior)  Goal: Improved Impulse and Aggression Control (Disruptive Behavior)  Outcome: Ongoing, Progressing     Problem: Mood Impairment (Disruptive Behavior)  Goal: Improved Mood Symptoms (Disruptive Behavior)  Outcome: Ongoing, Progressing                                Home

## 2023-01-20 NOTE — ED PROVIDER NOTE - PHYSICAL EXAMINATION
EXAM:  CONSTITUTIONAL: WA / WN / NAD  HEAD: NCAT  EYES: PERRL; EOMI; anicteric.  ENT: Normal pharynx; mucous membranes pink/moist, no erythema.  NECK: Supple; no meningeal signs  CARD: tachycardia nl S1/S2; no M/R/G. Pulses equal bilaterally.  RESP: Respiratory rate and effort are normal; breath sounds clear and equal bilaterally.  ABD: Soft, diffuse tenderness,  no masses; no rebound  MSK/EXT: No gross deformities; full range of motion.  SKIN: Warm and dry;      PSYCH: Memory Intact, Normal Affect

## 2023-01-20 NOTE — ED PROVIDER NOTE - IV ALTEPLASE INCLUSION HIDDEN
Diagnosis: Phimosis    Procedure done: Dorsal slit    Procedure    Brought to the procedure suite and placed supine.  Penile block was then performed using 1% lidocaine.  Dorsal slit was then performed in the usual fashion.  The phimotic foreskin was incised vertically and sown horizontally using 3-0 chromic suture.  Patient tolerated procedure well.  No complications identified during the procedure.  There was minimal bleeding during the operation.  
show

## 2023-01-20 NOTE — ED PROVIDER NOTE - OBJECTIVE STATEMENT
57-year-old female to ED with abdominal pain.  Patient with history of diverticulosis.  Over the past week she has been having worsening pain and diarrhea.  No trauma or fall.  No sick contacts.  She had no relief with oral hydration and continues to feel weak tired.

## 2023-01-20 NOTE — ED PROVIDER NOTE - NS ED ROS FT
ROS  Constitutional:  See HPI.  Eyes:  No visual changes, eye pain or discharge.  ENMT:  No hearing changes, pain, discharge or infections. No neck pain or stiffness.  Cardiac:  No chest pain, SOB or edema. No chest pain with exertion.  Respiratory:  No cough or respiratory distress. No hemoptysis.  GI:  ++ diarrhea or abdominal pain.  :  No dysuria, frequency or burning.  MS:  No myalgia, muscle weakness, joint pain or back pain.  Neuro:  No focal neurological complaints.

## 2023-01-20 NOTE — ED ADULT TRIAGE NOTE - CHIEF COMPLAINT QUOTE
"I have diverticulitis and I have very bad pains in my belly and diarrhea for about a week now." "I have diverticulosis and I have very bad pains in my belly and diarrhea for about a week now."

## 2023-01-20 NOTE — ED PROVIDER NOTE - CLINICAL SUMMARY MEDICAL DECISION MAKING FREE TEXT BOX
ED work-up shows colitis.  Patient with persistent symptoms.  Will admit for IV fluids and GI evaluation.

## 2023-01-21 LAB — GI PCR PANEL: SIGNIFICANT CHANGE UP

## 2023-01-21 PROCEDURE — 99223 1ST HOSP IP/OBS HIGH 75: CPT

## 2023-01-21 RX ORDER — METRONIDAZOLE 500 MG
500 TABLET ORAL EVERY 8 HOURS
Refills: 0 | Status: DISCONTINUED | OUTPATIENT
Start: 2023-01-21 | End: 2023-01-22

## 2023-01-21 RX ORDER — KETOROLAC TROMETHAMINE 30 MG/ML
15 SYRINGE (ML) INJECTION ONCE
Refills: 0 | Status: DISCONTINUED | OUTPATIENT
Start: 2023-01-21 | End: 2023-01-21

## 2023-01-21 RX ORDER — ONDANSETRON 8 MG/1
4 TABLET, FILM COATED ORAL ONCE
Refills: 0 | Status: COMPLETED | OUTPATIENT
Start: 2023-01-21 | End: 2023-01-21

## 2023-01-21 RX ORDER — CIPROFLOXACIN LACTATE 400MG/40ML
400 VIAL (ML) INTRAVENOUS EVERY 12 HOURS
Refills: 0 | Status: DISCONTINUED | OUTPATIENT
Start: 2023-01-21 | End: 2023-01-22

## 2023-01-21 RX ORDER — ONDANSETRON 8 MG/1
4 TABLET, FILM COATED ORAL EVERY 6 HOURS
Refills: 0 | Status: DISCONTINUED | OUTPATIENT
Start: 2023-01-21 | End: 2023-01-27

## 2023-01-21 RX ORDER — SODIUM CHLORIDE 9 MG/ML
1000 INJECTION INTRAMUSCULAR; INTRAVENOUS; SUBCUTANEOUS
Refills: 0 | Status: DISCONTINUED | OUTPATIENT
Start: 2023-01-21 | End: 2023-01-24

## 2023-01-21 RX ORDER — KETOROLAC TROMETHAMINE 30 MG/ML
15 SYRINGE (ML) INJECTION EVERY 8 HOURS
Refills: 0 | Status: DISCONTINUED | OUTPATIENT
Start: 2023-01-21 | End: 2023-01-22

## 2023-01-21 RX ORDER — ACETAMINOPHEN 500 MG
650 TABLET ORAL EVERY 6 HOURS
Refills: 0 | Status: DISCONTINUED | OUTPATIENT
Start: 2023-01-21 | End: 2023-01-25

## 2023-01-21 RX ADMIN — Medication 15 MILLIGRAM(S): at 04:00

## 2023-01-21 RX ADMIN — SODIUM CHLORIDE 75 MILLILITER(S): 9 INJECTION INTRAMUSCULAR; INTRAVENOUS; SUBCUTANEOUS at 16:05

## 2023-01-21 RX ADMIN — ONDANSETRON 4 MILLIGRAM(S): 8 TABLET, FILM COATED ORAL at 17:46

## 2023-01-21 RX ADMIN — Medication 650 MILLIGRAM(S): at 09:42

## 2023-01-21 RX ADMIN — Medication 650 MILLIGRAM(S): at 08:27

## 2023-01-21 RX ADMIN — Medication 100 MILLIGRAM(S): at 16:06

## 2023-01-21 RX ADMIN — Medication 200 MILLIGRAM(S): at 17:47

## 2023-01-21 RX ADMIN — Medication 650 MILLIGRAM(S): at 16:35

## 2023-01-21 RX ADMIN — Medication 15 MILLIGRAM(S): at 05:24

## 2023-01-21 RX ADMIN — Medication 15 MILLIGRAM(S): at 19:46

## 2023-01-21 RX ADMIN — Medication 650 MILLIGRAM(S): at 17:48

## 2023-01-21 RX ADMIN — Medication 15 MILLIGRAM(S): at 10:45

## 2023-01-21 RX ADMIN — Medication 100 MILLIGRAM(S): at 21:19

## 2023-01-21 RX ADMIN — Medication 15 MILLIGRAM(S): at 11:46

## 2023-01-21 NOTE — H&P ADULT - NSHPPHYSICALEXAM_GEN_ALL_CORE
VITALS:   T(C): 37.1 (01-21-23 @ 02:29), Max: 37.2 (01-20-23 @ 16:49)  HR: 94 (01-21-23 @ 01:00) (94 - 108)  BP: 100/62 (01-21-23 @ 02:29) (100/62 - 137/66)  RR: 17 (01-21-23 @ 02:29) (17 - 22)  SpO2: 100% (01-21-23 @ 02:29) (97% - 100%)    GENERAL: NAD, lying in bed comfortably  HEAD:  Atraumatic, normocephalic  EYES: EOMI, PERRLA, conjunctiva and sclera clear  ENT: Moist mucous membranes  NECK: Supple, no JVD  HEART: Regular rate and rhythm, no murmurs, rubs, or gallops  LUNGS: Unlabored respirations.  Clear to auscultation bilaterally, no crackles, wheezing, or rhonchi  ABDOMEN: Soft, nontender, nondistended, +BS  EXTREMITIES: 2+ peripheral pulses bilaterally. No clubbing, cyanosis, or edema  NERVOUS SYSTEM:  A&Ox3, no focal deficits   SKIN: No rashes or lesions

## 2023-01-21 NOTE — PHARMACOTHERAPY INTERVENTION NOTE - COMMENTS
Pt has documented allergy to Aspirin, and received one dose of Toradol without any rxs.  Confirmed with dr. Reis in teams ...aware of documented allergy to Aspirin but wants to continue with Toradol as is.

## 2023-01-21 NOTE — H&P ADULT - HISTORY OF PRESENT ILLNESS
56 yo female h diverticulosis prestented for diarrhea.   The patient stated that the diarrhea started about 8 days ago. The diarrhea is water, non bloody and is associated with lower abdominal  crampy pain.   The patient stated that she has about 5-6 episodes a day, and she is not able to tolerate po intake, and she had few episodes of vomiting.  She denies any travel, sick contacts or recent abx use. However she endorses chills and subjective fever, but denies any chronic diarrhea   She was advised by her GI ( dr. roth) to visit the ed.   In the ed her ct scan showed colitis     58 yo female [facility] diverticulosis prestented for diarrhea.   The patient stated that the diarrhea started about 8 days ago. The diarrhea is water, non bloody and is associated with lower abdominal  crampy pain.   The patient stated that she has about 5-6 episodes a day, and she is not able to tolerate po intake, and she had few episodes of vomiting.  She denies any travel, sick contacts or recent abx use. However she endorses chills and subjective fever, but denies any chronic diarrhea   She was advised by her GI ( dr. Mckeon) to visit the ed.   In the ed her ct scan showed colitis

## 2023-01-21 NOTE — H&P ADULT - NSICDXPASTSURGICALHX_GEN_ALL_CORE_FT
PAST SURGICAL HISTORY:  Cyst of neck     History of bladder surgery     History of umbilical hernia repair

## 2023-01-21 NOTE — CONSULT NOTE ADULT - ASSESSMENT
56 yo female PMH of diverticulosis presented for diarrhea. she had  history of diverticulosis, Colonic Polyps , prior colonic perforation in 2014 from complicated diverticulitis which was responsive to conservative measures at the time, here for second opinion for colonoscopy. Last colonoscopy done w/ Dr. Williamson was incomplete as the descending colon could not be passed because of narrowing. After colonoscopy CT scan was done which was without any acute abdominal pathology    #)Acute watery diarrhea since last 8 days DD: r/o infectious vs inflammatory (?stricture)  -h/o diverticulitis and perforation in 2014 didn't have surgery  -h/o colonic polyps   -Last colonoscopy done w/ Dr. Williamson was incomplete as the descending colon could not be passed because of narrowing.   -Hemodynamically stable   -CT scan abdomen at the time of admission showed Diffuse abnormal colonic circumferential wall thickening, mucosal hyperenhancement from mid transverse through sigmoid colon, with mild pericolonic inflammatory stranding. Colonic diverticulosis  -4 BM today     Recs;   check GI PCR, C.diff, ESR, CRP, fecal tanmay protectin, TSH   diet as tolerated   c/w IVF   c/w Abx once c.diff is negative   once infectious work up is negative diarrhea improves can have OP colonoscopy which she has an appointment on Jan 30  if no improvement in diarrhea will consider inpatient colonoscopy

## 2023-01-21 NOTE — CONSULT NOTE ADULT - SUBJECTIVE AND OBJECTIVE BOX
Gastroenterology Consultation:    Patient is a 57y old  Female who presents with a chief complaint of diarrhea (21 Jan 2023 10:40)      Admitted on: 01-20-23  HPI:  58 yo female PMH of diverticulosis presented for diarrhea.   The patient stated that the diarrhea started about 8 days ago. The diarrhea is water, non bloody and is associated with lower abdominal  crampy pain.   The patient stated that she has about 5-6 episodes a day, and she is not able to tolerate po intake, and she had few episodes of vomiting only one day on 1/20.  She denies any travel, sick contacts or recent abx use. However she endorses chills and subjective fever, but denies any chronic diarrhea   She was advised by her GI ( dr. bucio) to visit the ed.   In the ed her ct scan showed colitis    she had  history of diverticulosis, Colonic Polyps , prior colonic perforation in 2014 from complicated diverticulitis which was responsive to conservative measures at the time, here for second opinion for colonoscopy. Last colonoscopy done w/ Dr. Williamson was incomplete as the descending colon could not be passed because of narrowing. After colonoscopy CT scan was done which was without any acute abdominal pathology.         Prior EGD: never had   Prior Colonoscopy: as per patient 2015, 2019 and 2022 by Dr. Williamson last colonoscopy couldn't be completed was told that there was a narrowing         PAST MEDICAL & SURGICAL HISTORY:  Osteoarthritis      Diverticulitis      GERD (gastroesophageal reflux disease)      History of umbilical hernia repair      History of bladder surgery      Cyst of neck          FAMILY HISTORY:  no family h/o Gi cancers     Social History:  Tobacco: Y  Alcohol: N  Drugs: N    Home Medications:  Multi Vitamin+:  (06 Feb 2018 10:51)  Probiotic Formula:  (06 Feb 2018 10:51)    MEDICATIONS  (STANDING):  ciprofloxacin   IVPB 400 milliGRAM(s) IV Intermittent every 12 hours  metroNIDAZOLE  IVPB 500 milliGRAM(s) IV Intermittent every 8 hours  sodium chloride 0.9%. 1000 milliLiter(s) (75 mL/Hr) IV Continuous <Continuous>    MEDICATIONS  (PRN):  acetaminophen     Tablet .. 650 milliGRAM(s) Oral every 6 hours PRN Temp greater or equal to 38C (100.4F), Moderate Pain (4 - 6)  ketorolac   Injectable 15 milliGRAM(s) IV Push every 8 hours PRN Severe Pain (7 - 10)      Allergies  aspirin (Short breath)  erythromycin (Other)  penicillins (Unknown)  sulfa  patient states that stitches needed to be re-opened after suture with sulfa material (Other)      Review of Systems:   Constitutional:  No Fever, No Chills  ENT/Mouth:  No Hearing Changes,  No Difficulty Swallowing  Eyes:  No Eye Pain, No Vision Changes  Cardiovascular:  No Chest Pain, No Palpitations  Respiratory:  No Cough, No Dyspnea  Gastrointestinal:  As described in HPI  Musculoskeletal:  No Joint Swelling, No Back Pain  Skin:  No Skin Lesions, No Jaundice  Neuro:  No Syncope, No Dizziness  Heme/Lymph:  No Bruising, No Bleeding.          Physical Examination:  T(C): 37.1 (01-21-23 @ 02:29), Max: 37.1 (01-21-23 @ 02:29)  HR: 94 (01-21-23 @ 01:00) (94 - 102)  BP: 100/62 (01-21-23 @ 02:29) (100/62 - 114/74)  RR: 17 (01-21-23 @ 02:29) (17 - 18)  SpO2: 100% (01-21-23 @ 02:29) (97% - 100%)        Constitutional: No acute distress.  Eyes:. Conjunctivae are clear, Sclera is non-icteric.  Ears Nose and Throat: The external ears are normal appearing,  Oral mucosa is pink and moist.  Respiratory:  No signs of respiratory distress. Lung sounds are clear bilaterally.  Cardiovascular:  S1 S2, Regular rate and rhythm.  GI: Abdomen is soft, symmetric, and non-tender without distention.   Neuro: No Tremor, No involuntary movements  Skin: No rashes, No Jaundice.          Data:                        13.2   12.64 )-----------( 200      ( 20 Jan 2023 19:39 )             39.6     Hgb Trend:  13.2  01-20-23 @ 19:39      01-20    139  |  103  |  16  ----------------------------<  124<H>  3.6   |  23  |  0.7    Ca    8.8      20 Jan 2023 19:39    TPro  5.6<L>  /  Alb  3.5  /  TBili  0.9  /  DBili  x   /  AST  15  /  ALT  14  /  AlkPhos  84  01-20    Liver panel trend:  TBili 0.9   /   AST 15   /   ALT 14   /   AlkP 84   /   Tptn 5.6   /   Alb 3.5    /   DBili --      01-20              Radiology:  CT Abdomen and Pelvis w/ IV Cont:   ACC: [id number] EXAM:  CT ABDOMEN AND PELVIS IC   ORDERED BY: RADHA WHITNEY     PROCEDURE DATE:  01/20/2023          INTERPRETATION:  CLINICAL STATEMENT: Abdominal pain.    TECHNIQUE: Contiguous axial CT images were obtained from the lower chest   to the pubic symphysis ministration.  Oral contrast was not administered.    Reformatted images in the coronal and sagittal planes were acquired.    Comparison made with CT abdomen and pelvis 7/14/2014.    FINDINGS:    LOWER CHEST: Unremarkable.    HEPATOBILIARY: Unremarkable.    SPLEEN: Unremarkable.    PANCREAS: Unremarkable.    ADRENAL GLANDS: Unremarkable.    KIDNEYS: Unremarkable.    ABDOMINOPELVIC NODES: Unremarkable.    PELVIC ORGANS: Unremarkable.    PERITONEUM/MESENTERY/BOWEL: Diffuse abnormal colonic circumferential wall   thickening, mucosal hyperenhancement from mid transverse through sigmoid   colon, with mild pericolonic inflammatory stranding. Colonic   diverticulosis. Trace free pelvic fluid. Normal appendix. No abscess or   grossfree air.    BONES/SOFT TISSUES: Unremarkable.        IMPRESSION:  Acute colitis from mid transverse through sigmoid colon; no evidence of   abscess.    --- End of Report ---            MAHESH GEORGE MD; Attending Radiologist  This document has been electronically signed. Jan 20 2023  8:25PM (01-20-23 @ 20:14)

## 2023-01-21 NOTE — H&P ADULT - ASSESSMENT
56 yo female presented for diarrhea, found to have colitis    #colitis  transverse and sigmoid colitis  no proctitis on ct, so uc is less likely, however given the age of the patient , colposcopy should be done   no recent abx use or hospitalizations  fluids  flagyl and ciprofloxacin given that the patient meets requirements for abx for GE   send gi panel   full liquid diet and advance as tolerated   cs gi ( dr roth )  admit to medicine     #full liquid diet  #no indication for ppi  #no indication for dvt prophylaxis

## 2023-01-21 NOTE — H&P ADULT - NSICDXPASTMEDICALHX_GEN_ALL_CORE_FT
PAST MEDICAL HISTORY:  Diverticulitis     GERD (gastroesophageal reflux disease)     Osteoarthritis

## 2023-01-21 NOTE — H&P ADULT - NSHPLABSRESULTS_GEN_ALL_CORE
LABS:                          13.2   12.64 )-----------( 200      ( 20 Jan 2023 19:39 )             39.6     Hb Trend: 13.2<--  WBC Trend: 12.64<--  Plt Trend: 200<--          01-20    139  |  103  |  16  ----------------------------<  124<H>  3.6   |  23  |  0.7    Ca    8.8      20 Jan 2023 19:39    TPro  5.6<L>  /  Alb  3.5  /  TBili  0.9  /  DBili  x   /  AST  15  /  ALT  14  /  AlkPhos  84  01-20    Troponin T, Serum: <0.01 ng/mL (01-20-23 @ 19:39)      Urinalysis Basic - ( 20 Jan 2023 22:29 )    Color: Yellow / Appearance: Clear / SG: >1.050 / pH: x  Gluc: x / Ketone: Small  / Bili: Negative / Urobili: <2 mg/dL   Blood: x / Protein: 100 mg/dL / Nitrite: Negative   Leuk Esterase: Negative / RBC: 6 /HPF / WBC 1 /HPF   Sq Epi: x / Non Sq Epi: 8 /HPF / Bacteria: Negative      CAPILLARY BLOOD GLUCOSE              IMAGING:

## 2023-01-21 NOTE — H&P ADULT - ATTENDING COMMENTS
56 yo female with a PMH of diverticulosis presented for diarrhea, found to have colitis    Vital Signs Last 24 Hrs  T(F): 98.7 (21 Jan 2023 02:29), Max: 98.7 (21 Jan 2023 02:29)  HR: 94 (21 Jan 2023 01:00) (94 - 102)  BP: 100/62 (21 Jan 2023 02:29) (100/62 - 114/74)  RR: 17 (21 Jan 2023 02:29) (17 - 18)  SpO2: 100% (21 Jan 2023 02:29) (97% - 100%)    PHYSICAL EXAM:  GENERAL: NAD, well-groomed, well-developed  HEAD:  Atraumatic, Normocephalic  EYES: EOMI, conjunctiva and sclera clear  ENMT: Moist mucous membranes, Good dentition, no thrush  NECK: Supple, No JVD  CHEST/LUNG: Clear to auscultation bilaterally, good air entry, non-labored breathing  HEART: RRR; S1/S2, No murmur  ABDOMEN: Soft, +tender, Nondistended; Bowel sounds present  VASCULAR: Normal pulses, Normal capillary refill  EXTREMITIES: No calf tenderness, No cyanosis, No edema  LYMPH: Normal; No lymphadenopathy noted  SKIN: Warm, Intact  PSYCH: Normal mood, Normal affect  NERVOUS SYSTEM:  A/O x3, Good concentration; CN 2-12 intact, No focal deficits    #colitis  transverse and sigmoid colitis on CT  no recent abx use or hospitalizations  fluids  continue flagyl and ciprofloxacin   GI panel ordered   full liquid diet and advance as tolerated   cs gi ( dr roth )  toradol for pain -pt understands that this will be limited 2/2 to renal side effects   admit to medicine     #full liquid diet  #no indication for ppi  #no indication for dvt prophylaxis  #Pending: gi pcr, gi consult, pain control

## 2023-01-22 LAB
ALBUMIN SERPL ELPH-MCNC: 2.8 G/DL — LOW (ref 3.5–5.2)
ALP SERPL-CCNC: 90 U/L — SIGNIFICANT CHANGE UP (ref 30–115)
ALT FLD-CCNC: 19 U/L — SIGNIFICANT CHANGE UP (ref 0–41)
ANION GAP SERPL CALC-SCNC: 9 MMOL/L — SIGNIFICANT CHANGE UP (ref 7–14)
AST SERPL-CCNC: 15 U/L — SIGNIFICANT CHANGE UP (ref 0–41)
BILIRUB SERPL-MCNC: 0.5 MG/DL — SIGNIFICANT CHANGE UP (ref 0.2–1.2)
BUN SERPL-MCNC: 10 MG/DL — SIGNIFICANT CHANGE UP (ref 10–20)
C DIFF BY PCR RESULT: DETECTED
CALCIUM SERPL-MCNC: 8.5 MG/DL — SIGNIFICANT CHANGE UP (ref 8.4–10.5)
CHLORIDE SERPL-SCNC: 106 MMOL/L — SIGNIFICANT CHANGE UP (ref 98–110)
CO2 SERPL-SCNC: 24 MMOL/L — SIGNIFICANT CHANGE UP (ref 17–32)
CREAT SERPL-MCNC: 0.5 MG/DL — LOW (ref 0.7–1.5)
CRP SERPL-MCNC: 252.2 MG/L — HIGH
EGFR: 109 ML/MIN/1.73M2 — SIGNIFICANT CHANGE UP
ERYTHROCYTE [SEDIMENTATION RATE] IN BLOOD: 22 MM/HR — HIGH (ref 0–20)
GLUCOSE SERPL-MCNC: 102 MG/DL — HIGH (ref 70–99)
HCT VFR BLD CALC: 34.9 % — LOW (ref 37–47)
HGB BLD-MCNC: 12 G/DL — SIGNIFICANT CHANGE UP (ref 12–16)
MCHC RBC-ENTMCNC: 30.4 PG — SIGNIFICANT CHANGE UP (ref 27–31)
MCHC RBC-ENTMCNC: 34.4 G/DL — SIGNIFICANT CHANGE UP (ref 32–37)
MCV RBC AUTO: 88.4 FL — SIGNIFICANT CHANGE UP (ref 81–99)
NRBC # BLD: 0 /100 WBCS — SIGNIFICANT CHANGE UP (ref 0–0)
PLATELET # BLD AUTO: 170 K/UL — SIGNIFICANT CHANGE UP (ref 130–400)
POTASSIUM SERPL-MCNC: 3.6 MMOL/L — SIGNIFICANT CHANGE UP (ref 3.5–5)
POTASSIUM SERPL-SCNC: 3.6 MMOL/L — SIGNIFICANT CHANGE UP (ref 3.5–5)
PROT SERPL-MCNC: 4.8 G/DL — LOW (ref 6–8)
RBC # BLD: 3.95 M/UL — LOW (ref 4.2–5.4)
RBC # FLD: 12.8 % — SIGNIFICANT CHANGE UP (ref 11.5–14.5)
SODIUM SERPL-SCNC: 139 MMOL/L — SIGNIFICANT CHANGE UP (ref 135–146)
WBC # BLD: 10.52 K/UL — SIGNIFICANT CHANGE UP (ref 4.8–10.8)
WBC # FLD AUTO: 10.52 K/UL — SIGNIFICANT CHANGE UP (ref 4.8–10.8)

## 2023-01-22 PROCEDURE — 93010 ELECTROCARDIOGRAM REPORT: CPT

## 2023-01-22 PROCEDURE — 99232 SBSQ HOSP IP/OBS MODERATE 35: CPT

## 2023-01-22 RX ORDER — VANCOMYCIN HCL 1 G
125 VIAL (EA) INTRAVENOUS EVERY 6 HOURS
Refills: 0 | Status: DISCONTINUED | OUTPATIENT
Start: 2023-01-22 | End: 2023-01-27

## 2023-01-22 RX ORDER — MORPHINE SULFATE 50 MG/1
1 CAPSULE, EXTENDED RELEASE ORAL EVERY 4 HOURS
Refills: 0 | Status: DISCONTINUED | OUTPATIENT
Start: 2023-01-22 | End: 2023-01-25

## 2023-01-22 RX ORDER — ENOXAPARIN SODIUM 100 MG/ML
40 INJECTION SUBCUTANEOUS EVERY 24 HOURS
Refills: 0 | Status: DISCONTINUED | OUTPATIENT
Start: 2023-01-22 | End: 2023-01-27

## 2023-01-22 RX ADMIN — Medication 100 MILLIGRAM(S): at 05:23

## 2023-01-22 RX ADMIN — MORPHINE SULFATE 1 MILLIGRAM(S): 50 CAPSULE, EXTENDED RELEASE ORAL at 22:00

## 2023-01-22 RX ADMIN — Medication 125 MILLIGRAM(S): at 18:02

## 2023-01-22 RX ADMIN — ONDANSETRON 4 MILLIGRAM(S): 8 TABLET, FILM COATED ORAL at 10:36

## 2023-01-22 RX ADMIN — Medication 15 MILLIGRAM(S): at 14:28

## 2023-01-22 RX ADMIN — Medication 15 MILLIGRAM(S): at 04:09

## 2023-01-22 RX ADMIN — MORPHINE SULFATE 1 MILLIGRAM(S): 50 CAPSULE, EXTENDED RELEASE ORAL at 11:52

## 2023-01-22 RX ADMIN — MORPHINE SULFATE 1 MILLIGRAM(S): 50 CAPSULE, EXTENDED RELEASE ORAL at 18:02

## 2023-01-22 RX ADMIN — Medication 15 MILLIGRAM(S): at 03:55

## 2023-01-22 RX ADMIN — Medication 200 MILLIGRAM(S): at 05:28

## 2023-01-22 RX ADMIN — MORPHINE SULFATE 1 MILLIGRAM(S): 50 CAPSULE, EXTENDED RELEASE ORAL at 11:54

## 2023-01-22 RX ADMIN — Medication 125 MILLIGRAM(S): at 15:45

## 2023-01-22 RX ADMIN — Medication 15 MILLIGRAM(S): at 15:09

## 2023-01-22 RX ADMIN — Medication 125 MILLIGRAM(S): at 23:55

## 2023-01-22 RX ADMIN — MORPHINE SULFATE 1 MILLIGRAM(S): 50 CAPSULE, EXTENDED RELEASE ORAL at 22:36

## 2023-01-22 RX ADMIN — MORPHINE SULFATE 1 MILLIGRAM(S): 50 CAPSULE, EXTENDED RELEASE ORAL at 18:10

## 2023-01-22 NOTE — PROGRESS NOTE ADULT - ASSESSMENT
58 yo female PMH of diverticulosis presented for diarrhea. she had  history of diverticulosis, Colonic Polyps , prior colonic perforation in 2014 from complicated diverticulitis which was responsive to conservative measures at the time, here for second opinion for colonoscopy. Last colonoscopy done w/ Dr. Williamson was incomplete as the descending colon could not be passed because of narrowing. After colonoscopy CT scan was done which was without any acute abdominal pathology    #)Acute watery diarrhea since last 8 days likely due to c.diff vs inflammatory (?stricture)  -h/o diverticulitis and perforation in 2014 didn't have surgery  -h/o colonic polyps   -Last colonoscopy done w/ Dr. Williamson was incomplete as the descending colon could not be passed because of narrowing.   -Hemodynamically stable   -CT scan abdomen at the time of admission showed Diffuse abnormal colonic circumferential wall thickening, mucosal hyperenhancement from mid transverse through sigmoid colon, with mild pericolonic inflammatory stranding. Colonic diverticulosis  -4-5 Bm since last night   -ESR and CRP high   -C.diff positive 1/22    Recs;   diet as tolerated   c/w IVF   treat with vancomycin 125 mg PO q6 for c.diff  P fecal calprotectin   colonoscopy as an OP has an appointment on Jan 30

## 2023-01-22 NOTE — PROGRESS NOTE ADULT - ASSESSMENT
58 yo female with a PMH of diverticulosis presented for diarrhea, found to have colitis 2/2 c. diff     #C. diff   #colitis  - 1/22 started on vanco PO 125mg q6 x 10days   - transverse and sigmoid colitis on CT  - no recent abx use or hospitalizations  - GI consult appreciated  - GI pcr negative  - Poor po tolerance  - continue IVF  - abd pain - PRN morphine     #Chest pain  - EKG ordered  - trops ordered     #full liquid diet  #no indication for ppi  #no indication for dvt prophylaxis  #Pending:pain control, diet tolerance, ekg, trops trending

## 2023-01-23 LAB
CRP SERPL-MCNC: 174.5 MG/L — HIGH
ERYTHROCYTE [SEDIMENTATION RATE] IN BLOOD: 11 MM/HR — SIGNIFICANT CHANGE UP (ref 0–20)
HCV AB S/CO SERPL IA: 0.04 COI — SIGNIFICANT CHANGE UP
HCV AB SERPL-IMP: SIGNIFICANT CHANGE UP
TROPONIN T SERPL-MCNC: <0.01 NG/ML — SIGNIFICANT CHANGE UP
TSH SERPL-MCNC: 1.78 UIU/ML — SIGNIFICANT CHANGE UP (ref 0.27–4.2)

## 2023-01-23 PROCEDURE — 99232 SBSQ HOSP IP/OBS MODERATE 35: CPT

## 2023-01-23 RX ORDER — VANCOMYCIN HCL 1 G
1 VIAL (EA) INTRAVENOUS
Qty: 40 | Refills: 0
Start: 2023-01-23 | End: 2023-02-01

## 2023-01-23 RX ADMIN — Medication 125 MILLIGRAM(S): at 05:26

## 2023-01-23 RX ADMIN — Medication 650 MILLIGRAM(S): at 02:30

## 2023-01-23 RX ADMIN — MORPHINE SULFATE 1 MILLIGRAM(S): 50 CAPSULE, EXTENDED RELEASE ORAL at 14:19

## 2023-01-23 RX ADMIN — Medication 125 MILLIGRAM(S): at 18:14

## 2023-01-23 RX ADMIN — Medication 125 MILLIGRAM(S): at 23:49

## 2023-01-23 RX ADMIN — MORPHINE SULFATE 1 MILLIGRAM(S): 50 CAPSULE, EXTENDED RELEASE ORAL at 13:13

## 2023-01-23 RX ADMIN — ONDANSETRON 4 MILLIGRAM(S): 8 TABLET, FILM COATED ORAL at 08:07

## 2023-01-23 RX ADMIN — Medication 650 MILLIGRAM(S): at 19:02

## 2023-01-23 RX ADMIN — Medication 650 MILLIGRAM(S): at 01:46

## 2023-01-23 RX ADMIN — Medication 125 MILLIGRAM(S): at 13:13

## 2023-01-23 RX ADMIN — Medication 650 MILLIGRAM(S): at 18:14

## 2023-01-23 NOTE — PROGRESS NOTE ADULT - ASSESSMENT
56 yo female with a PMH of diverticulosis presented for diarrhea, found to have colitis 2/2 c. diff     #C. diff   #colitis  - patient has history of diverticulitis and perf in 2014  - 1/22 started on vanco PO 125mg q6 x 10days   - transverse and sigmoid colitis on CT  - no recent abx use or hospitalizations  - GI consult appreciated, colonoscopy 1/30 outpatient  - GI pcr negative  - Poor po tolerance, complains of vomiting and 4 episodes of diarrhea overnight  - continue IVF  - abd pain - PRN morphine     #Chest pain  - trops ordered and are negative     full liquid diet, tolerates ferina hot cereal  no indication for dvt prophylaxis or PPI  Pending: pain control, diet tolerance, resolution/improvement in diarrhea

## 2023-01-23 NOTE — PROGRESS NOTE ADULT - ASSESSMENT
58 yo female with a PMH of diverticulosis presented for diarrhea, found to have colitis 2/2 c. diff     # C. diff colitis  not sure of etiology: no recent abx; no recent hosp; last scope 9/2022  pt thinks she may have gotten it from her ex-boyfriend, but she could/would not explain further  vanco PO 125mg q6 [id number] (1/22-2/1)  transverse and sigmoid colitis on CT  GI consult appreciated  GI pcr negative  IVF  abd pain - PRN morphine   avoid imodium  avoid PPI    # Chest pain  EKG nl  trops neg    # likely personality disorder    # DVT ppx: LMWH    Dispo: pt can go home once stools not watery; 3 or less/day and pt able to eat/drink reasonably - poss 48 hrs?

## 2023-01-23 NOTE — PROGRESS NOTE ADULT - ASSESSMENT
56 yo female PMH of diverticulosis presented for diarrhea. she had  history of diverticulosis, Colonic Polyps , prior colonic perforation in 2014 from complicated diverticulitis which was responsive to conservative measures at the time, here for second opinion for colonoscopy. Last colonoscopy done w/ Dr. Williamson was incomplete as the descending colon could not be passed because of narrowing. After colonoscopy CT scan was done which was without any acute abdominal pathology    # Acute watery diarrhea 2/2 c. diff colitis  -h/o diverticulitis and perforation in 2014 didn't have surgery  -h/o colonic polyps   -Last colonoscopy done w/ Dr. Williamson was incomplete as the descending colon could not be passed because of narrowing.   -Hemodynamically stable   - CT scan abdomen at the time of admission showed Diffuse abnormal colonic circumferential wall thickening, mucosal hyperenhancement from mid transverse through sigmoid colon, with mild pericolonic inflammatory stranding. Colonic diverticulosis  -ESR and CRP high   -C.diff positive 1/22    Recs;   - diet as tolerated   - c/w IVF   - treat with vancomycin 125 mg PO q6 for 10 days     - follow up with Dr. bucio as outpatient for scheduled colonoscopy     recall as needed

## 2023-01-24 LAB
ALBUMIN SERPL ELPH-MCNC: 2.7 G/DL — LOW (ref 3.5–5.2)
ALP SERPL-CCNC: 95 U/L — SIGNIFICANT CHANGE UP (ref 30–115)
ALT FLD-CCNC: 25 U/L — SIGNIFICANT CHANGE UP (ref 0–41)
ANION GAP SERPL CALC-SCNC: 12 MMOL/L — SIGNIFICANT CHANGE UP (ref 7–14)
AST SERPL-CCNC: 27 U/L — SIGNIFICANT CHANGE UP (ref 0–41)
BASOPHILS # BLD AUTO: 0.03 K/UL — SIGNIFICANT CHANGE UP (ref 0–0.2)
BASOPHILS NFR BLD AUTO: 0.5 % — SIGNIFICANT CHANGE UP (ref 0–1)
BILIRUB SERPL-MCNC: 0.3 MG/DL — SIGNIFICANT CHANGE UP (ref 0.2–1.2)
BLD GP AB SCN SERPL QL: SIGNIFICANT CHANGE UP
BUN SERPL-MCNC: 3 MG/DL — LOW (ref 10–20)
CALCIUM SERPL-MCNC: 8.3 MG/DL — LOW (ref 8.4–10.5)
CHLORIDE SERPL-SCNC: 105 MMOL/L — SIGNIFICANT CHANGE UP (ref 98–110)
CO2 SERPL-SCNC: 23 MMOL/L — SIGNIFICANT CHANGE UP (ref 17–32)
CREAT SERPL-MCNC: 0.5 MG/DL — LOW (ref 0.7–1.5)
EGFR: 109 ML/MIN/1.73M2 — SIGNIFICANT CHANGE UP
EOSINOPHIL # BLD AUTO: 0.21 K/UL — SIGNIFICANT CHANGE UP (ref 0–0.7)
EOSINOPHIL NFR BLD AUTO: 3.8 % — SIGNIFICANT CHANGE UP (ref 0–8)
GLUCOSE SERPL-MCNC: 124 MG/DL — HIGH (ref 70–99)
HCT VFR BLD CALC: 34.6 % — LOW (ref 37–47)
HGB BLD-MCNC: 11.7 G/DL — LOW (ref 12–16)
IMM GRANULOCYTES NFR BLD AUTO: 0.9 % — HIGH (ref 0.1–0.3)
LYMPHOCYTES # BLD AUTO: 0.6 K/UL — LOW (ref 1.2–3.4)
LYMPHOCYTES # BLD AUTO: 11 % — LOW (ref 20.5–51.1)
MAGNESIUM SERPL-MCNC: 1.6 MG/DL — LOW (ref 1.8–2.4)
MCHC RBC-ENTMCNC: 30.4 PG — SIGNIFICANT CHANGE UP (ref 27–31)
MCHC RBC-ENTMCNC: 33.8 G/DL — SIGNIFICANT CHANGE UP (ref 32–37)
MCV RBC AUTO: 89.9 FL — SIGNIFICANT CHANGE UP (ref 81–99)
MONOCYTES # BLD AUTO: 0.54 K/UL — SIGNIFICANT CHANGE UP (ref 0.1–0.6)
MONOCYTES NFR BLD AUTO: 9.9 % — HIGH (ref 1.7–9.3)
NEUTROPHILS # BLD AUTO: 4.04 K/UL — SIGNIFICANT CHANGE UP (ref 1.4–6.5)
NEUTROPHILS NFR BLD AUTO: 73.9 % — SIGNIFICANT CHANGE UP (ref 42.2–75.2)
NRBC # BLD: 0 /100 WBCS — SIGNIFICANT CHANGE UP (ref 0–0)
PLATELET # BLD AUTO: 182 K/UL — SIGNIFICANT CHANGE UP (ref 130–400)
POTASSIUM SERPL-MCNC: 3.2 MMOL/L — LOW (ref 3.5–5)
POTASSIUM SERPL-SCNC: 3.2 MMOL/L — LOW (ref 3.5–5)
PROT SERPL-MCNC: 4.6 G/DL — LOW (ref 6–8)
RBC # BLD: 3.85 M/UL — LOW (ref 4.2–5.4)
RBC # FLD: 12.8 % — SIGNIFICANT CHANGE UP (ref 11.5–14.5)
SODIUM SERPL-SCNC: 140 MMOL/L — SIGNIFICANT CHANGE UP (ref 135–146)
WBC # BLD: 5.47 K/UL — SIGNIFICANT CHANGE UP (ref 4.8–10.8)
WBC # FLD AUTO: 5.47 K/UL — SIGNIFICANT CHANGE UP (ref 4.8–10.8)

## 2023-01-24 PROCEDURE — 99232 SBSQ HOSP IP/OBS MODERATE 35: CPT

## 2023-01-24 RX ORDER — MAGNESIUM SULFATE 500 MG/ML
2 VIAL (ML) INJECTION ONCE
Refills: 0 | Status: COMPLETED | OUTPATIENT
Start: 2023-01-24 | End: 2023-01-24

## 2023-01-24 RX ORDER — SIMETHICONE 80 MG/1
80 TABLET, CHEWABLE ORAL EVERY 6 HOURS
Refills: 0 | Status: DISCONTINUED | OUTPATIENT
Start: 2023-01-24 | End: 2023-01-27

## 2023-01-24 RX ORDER — POTASSIUM CHLORIDE 20 MEQ
40 PACKET (EA) ORAL EVERY 4 HOURS
Refills: 0 | Status: COMPLETED | OUTPATIENT
Start: 2023-01-24 | End: 2023-01-24

## 2023-01-24 RX ADMIN — Medication 25 GRAM(S): at 12:34

## 2023-01-24 RX ADMIN — Medication 40 MILLIEQUIVALENT(S): at 18:55

## 2023-01-24 RX ADMIN — SODIUM CHLORIDE 75 MILLILITER(S): 9 INJECTION INTRAMUSCULAR; INTRAVENOUS; SUBCUTANEOUS at 04:30

## 2023-01-24 RX ADMIN — SIMETHICONE 80 MILLIGRAM(S): 80 TABLET, CHEWABLE ORAL at 12:34

## 2023-01-24 RX ADMIN — Medication 40 MILLIEQUIVALENT(S): at 12:34

## 2023-01-24 RX ADMIN — Medication 650 MILLIGRAM(S): at 15:59

## 2023-01-24 RX ADMIN — Medication 125 MILLIGRAM(S): at 18:55

## 2023-01-24 RX ADMIN — Medication 125 MILLIGRAM(S): at 06:48

## 2023-01-24 RX ADMIN — SIMETHICONE 80 MILLIGRAM(S): 80 TABLET, CHEWABLE ORAL at 18:55

## 2023-01-24 RX ADMIN — Medication 125 MILLIGRAM(S): at 12:34

## 2023-01-24 RX ADMIN — Medication 650 MILLIGRAM(S): at 16:34

## 2023-01-24 RX ADMIN — MORPHINE SULFATE 1 MILLIGRAM(S): 50 CAPSULE, EXTENDED RELEASE ORAL at 21:00

## 2023-01-24 RX ADMIN — MORPHINE SULFATE 1 MILLIGRAM(S): 50 CAPSULE, EXTENDED RELEASE ORAL at 20:42

## 2023-01-24 NOTE — PROGRESS NOTE ADULT - ASSESSMENT
58 yo female with a PMH of diverticulosis presented for diarrhea, found to have colitis 2/2 c. diff     #C. diff   #colitis  - patient has history of diverticulitis and perf in 2014  - 1/22 started on vanco PO 125mg q6 x 10days , end date 2/1/23  - transverse and sigmoid colitis on CT  - no recent abx use or hospitalizations  - GI consult appreciated, colonoscopy 1/30 outpatient  - GI pcr negative  - Poor po tolerance, complains of vomiting and 3 episodes of watery diarrhea overnight  - discontinued IV fluids, started patient on regular diet as per her request  - abd pain - PRN morphine     #Chest pain  - trops ordered and are negative     Regular diet  no indication for dvt prophylaxis or PPI  Pending: pain control, diet tolerance, resolution/improvement in diarrhea

## 2023-01-24 NOTE — PROGRESS NOTE ADULT - ASSESSMENT
57 y.o F w/ Hx of diverticulosis, Colonic Polyps , prior colonic perforation in 2014 from complicated diverticulitis which was responsive to conservative measures at the time,  presented for diarrhea and found to have c diff colitis, being followed.   Last colonoscopy done w/ Dr. Williamson was incomplete as the descending colon could not be passed because of narrowing.   After colonoscopy CT scan was done which was without any acute abdominal pathology    # Acute watery diarrhea 2/2 c. diff colitis  -h/o diverticulitis and perforation in 2014 didn't have surgery  -h/o colonic polyps   -Last colonoscopy done w/ Dr. Williamson was incomplete as the descending colon could not be passed because of narrowing.   -Hemodynamically stable and clinically improving w/ PO vancomycin  -CT scan abdomen at the time of admission showed Diffuse abnormal colonic circumferential wall thickening, mucosal hyperenhancement from mid transverse through sigmoid colon, with mild pericolonic inflammatory stranding. Colonic diverticulosis  -ESR and CRP high   -C.diff positive 1/22    Recs;   - c/w GI soft diet as tolerated   - c/w IVF   - treat with vancomycin 125 mg PO q6 for 10 days     - monitor electrolytes and adjust as needed  - supportive care per primary team  - follow up as outpatient as scheduled   - will perform colonoscopy as outpatient  - please recall as needed     Time-based billing (NON-critical care).   25 minutes spent on total encounter; more than 50% of the visit was spent counseling and / or coordinating care by the attending physician.  The necessity of the time spent during the encounter on this date of service was due to: Coordination of care.

## 2023-01-24 NOTE — PROGRESS NOTE ADULT - ASSESSMENT
56 yo female with a PMH of diverticulosis presented for diarrhea, found to have colitis 2/2 c. diff     # C. diff colitis  not sure of etiology: no recent abx; no recent hosp; last scope 9/2022  pt thinks she may have gotten it from her ex-boyfriend, but she could/would not explain further  vanco PO 125mg q6 [id number] (1/22-2/1)  transverse and sigmoid colitis on CT  GI consult appreciated  GI pcr negative  diet: reg  IVFs: stop  simethicone 80 po q6  abd pain - PRN morphine   avoid imodium  avoid PPI    # Chest pain  EKG nl  trops neg    # likely personality disorder    # DVT ppx: LMWH    Dispo: pt can go home once stools not watery and 3 or less/day and pt able to eat/drink reasonably - poss 48 hrs?   58 yo female with a PMH of diverticulosis presented for diarrhea, found to have colitis 2/2 c. diff     # C. diff colitis  not sure of etiology: no recent abx; no recent hosp; last scope 9/2022  pt thinks she may have gotten it from her ex-boyfriend, but she could/would not explain further  vanco PO 125mg q6 [id number] (1/22-2/1)  transverse and sigmoid colitis on CT  GI consult appreciated  GI pcr negative  diet: reg  IVFs: stop  simethicone 80 po q6  abd pain - PRN morphine   avoid imodium  avoid PPI    # hypokalemia  replace oral    # hypomagnesemia  iv Mg rider    # Chest pain  EKG nl  trops neg    # likely personality disorder    # DVT ppx: LMWH    Dispo: fix lytes; po vanco; adv diet; d/c IVFs  pt can go home once stools not watery and 3 or less/day and pt able to eat/drink reasonably - poss 48 hrs?

## 2023-01-25 LAB
ALBUMIN SERPL ELPH-MCNC: 2.9 G/DL — LOW (ref 3.5–5.2)
ALP SERPL-CCNC: 108 U/L — SIGNIFICANT CHANGE UP (ref 30–115)
ALT FLD-CCNC: 29 U/L — SIGNIFICANT CHANGE UP (ref 0–41)
ANION GAP SERPL CALC-SCNC: 14 MMOL/L — SIGNIFICANT CHANGE UP (ref 7–14)
AST SERPL-CCNC: 29 U/L — SIGNIFICANT CHANGE UP (ref 0–41)
BASOPHILS # BLD AUTO: 0.03 K/UL — SIGNIFICANT CHANGE UP (ref 0–0.2)
BASOPHILS NFR BLD AUTO: 0.6 % — SIGNIFICANT CHANGE UP (ref 0–1)
BILIRUB SERPL-MCNC: 0.3 MG/DL — SIGNIFICANT CHANGE UP (ref 0.2–1.2)
BUN SERPL-MCNC: 5 MG/DL — LOW (ref 10–20)
CALCIUM SERPL-MCNC: 8.4 MG/DL — SIGNIFICANT CHANGE UP (ref 8.4–10.5)
CALPROTECTIN STL-MCNT: 3232 UG/G — HIGH (ref 0–120)
CHLORIDE SERPL-SCNC: 106 MMOL/L — SIGNIFICANT CHANGE UP (ref 98–110)
CO2 SERPL-SCNC: 21 MMOL/L — SIGNIFICANT CHANGE UP (ref 17–32)
CREAT SERPL-MCNC: 0.5 MG/DL — LOW (ref 0.7–1.5)
EGFR: 109 ML/MIN/1.73M2 — SIGNIFICANT CHANGE UP
EOSINOPHIL # BLD AUTO: 0.22 K/UL — SIGNIFICANT CHANGE UP (ref 0–0.7)
EOSINOPHIL NFR BLD AUTO: 4.2 % — SIGNIFICANT CHANGE UP (ref 0–8)
GLUCOSE SERPL-MCNC: 111 MG/DL — HIGH (ref 70–99)
HCT VFR BLD CALC: 34.7 % — LOW (ref 37–47)
HGB BLD-MCNC: 11.8 G/DL — LOW (ref 12–16)
IMM GRANULOCYTES NFR BLD AUTO: 1.7 % — HIGH (ref 0.1–0.3)
LYMPHOCYTES # BLD AUTO: 0.74 K/UL — LOW (ref 1.2–3.4)
LYMPHOCYTES # BLD AUTO: 14.1 % — LOW (ref 20.5–51.1)
MAGNESIUM SERPL-MCNC: 1.8 MG/DL — SIGNIFICANT CHANGE UP (ref 1.8–2.4)
MCHC RBC-ENTMCNC: 30.3 PG — SIGNIFICANT CHANGE UP (ref 27–31)
MCHC RBC-ENTMCNC: 34 G/DL — SIGNIFICANT CHANGE UP (ref 32–37)
MCV RBC AUTO: 89 FL — SIGNIFICANT CHANGE UP (ref 81–99)
MONOCYTES # BLD AUTO: 0.5 K/UL — SIGNIFICANT CHANGE UP (ref 0.1–0.6)
MONOCYTES NFR BLD AUTO: 9.6 % — HIGH (ref 1.7–9.3)
NEUTROPHILS # BLD AUTO: 3.65 K/UL — SIGNIFICANT CHANGE UP (ref 1.4–6.5)
NEUTROPHILS NFR BLD AUTO: 69.8 % — SIGNIFICANT CHANGE UP (ref 42.2–75.2)
NRBC # BLD: 0 /100 WBCS — SIGNIFICANT CHANGE UP (ref 0–0)
PLATELET # BLD AUTO: 203 K/UL — SIGNIFICANT CHANGE UP (ref 130–400)
POTASSIUM SERPL-MCNC: 4.2 MMOL/L — SIGNIFICANT CHANGE UP (ref 3.5–5)
POTASSIUM SERPL-SCNC: 4.2 MMOL/L — SIGNIFICANT CHANGE UP (ref 3.5–5)
PROT SERPL-MCNC: 4.9 G/DL — LOW (ref 6–8)
RBC # BLD: 3.9 M/UL — LOW (ref 4.2–5.4)
RBC # FLD: 12.9 % — SIGNIFICANT CHANGE UP (ref 11.5–14.5)
SODIUM SERPL-SCNC: 141 MMOL/L — SIGNIFICANT CHANGE UP (ref 135–146)
WBC # BLD: 5.23 K/UL — SIGNIFICANT CHANGE UP (ref 4.8–10.8)
WBC # FLD AUTO: 5.23 K/UL — SIGNIFICANT CHANGE UP (ref 4.8–10.8)

## 2023-01-25 PROCEDURE — 99233 SBSQ HOSP IP/OBS HIGH 50: CPT

## 2023-01-25 RX ADMIN — Medication 125 MILLIGRAM(S): at 05:44

## 2023-01-25 RX ADMIN — Medication 125 MILLIGRAM(S): at 23:23

## 2023-01-25 RX ADMIN — MORPHINE SULFATE 1 MILLIGRAM(S): 50 CAPSULE, EXTENDED RELEASE ORAL at 06:35

## 2023-01-25 RX ADMIN — Medication 650 MILLIGRAM(S): at 03:43

## 2023-01-25 RX ADMIN — SIMETHICONE 80 MILLIGRAM(S): 80 TABLET, CHEWABLE ORAL at 05:44

## 2023-01-25 RX ADMIN — SIMETHICONE 80 MILLIGRAM(S): 80 TABLET, CHEWABLE ORAL at 00:03

## 2023-01-25 RX ADMIN — Medication 125 MILLIGRAM(S): at 17:52

## 2023-01-25 RX ADMIN — SIMETHICONE 80 MILLIGRAM(S): 80 TABLET, CHEWABLE ORAL at 12:18

## 2023-01-25 RX ADMIN — SIMETHICONE 80 MILLIGRAM(S): 80 TABLET, CHEWABLE ORAL at 23:22

## 2023-01-25 RX ADMIN — Medication 125 MILLIGRAM(S): at 00:03

## 2023-01-25 RX ADMIN — MORPHINE SULFATE 1 MILLIGRAM(S): 50 CAPSULE, EXTENDED RELEASE ORAL at 05:44

## 2023-01-25 RX ADMIN — SIMETHICONE 80 MILLIGRAM(S): 80 TABLET, CHEWABLE ORAL at 17:52

## 2023-01-25 RX ADMIN — Medication 125 MILLIGRAM(S): at 12:25

## 2023-01-25 NOTE — PROGRESS NOTE ADULT - ASSESSMENT
58 yo female with a PMH of diverticulosis presented for diarrhea, found to have colitis 2/2 c. diff     # Limit Labs - they are nl    # C. diff colitis  not sure of etiology: no recent abx; no recent hosp; last scope 9/2022  pt thinks she may have gotten it from her ex-boyfriend, but she could/would not explain further  vanco PO 125mg q6 [id number] (1/22-2/1)  transverse and sigmoid colitis on CT  GI consult appreciated  GI pcr negative  diet: reg  IVFs: stop  simethicone 80 po q6  abd pain: percocet 5/325 po q6 prn  avoid imodium  avoid PPI    # hypokalemia  better    # hypomagnesemia  better    # Chest pain  EKG nl  trops neg    # likely personality disorder; likely adjustment disorder  has 2 children: oldest dtr 27 w/ children, lives in [facility]) - pt visits at times (last Oct 2022); younger dtr 23 (has anxiety issues) lives nearby and has visited mother in hospital; pt was recently in a relationship w/ a man for 11 yrs and he broke up the relationship and is seeing another woman; they pt is having a hard time w/ the break up; the boyfriend visited pt in hospital last night, which seems to have made things emotionally worse for her; pt has seen psych in remote past  pt would like to talk w/ psych now  she is NOT suicidal or homicidal  I provided emotional support, which she appreciated    # DVT ppx: LMWH    Dispo: po vanco; diet; psych  pt can go home once stools 3 or less/day and pt able to eat/drink reasonably - poss 24-48 hrs? (anticipate)

## 2023-01-25 NOTE — PROGRESS NOTE ADULT - ASSESSMENT
56 yo female with a PMH of diverticulosis presented for diarrhea, found to have colitis 2/2 c. diff     #C. diff   #colitis  - patient has history of diverticulitis and perf in 2014  - 1/22 started on vanco PO 125mg q6 x 10days , end date 2/1/23  - transverse and sigmoid colitis on CT  - no recent abx use or hospitalizations  - GI consult appreciated, colonoscopy 1/30 outpatient  - GI pcr negative  - Poor po tolerance  - discontinued IV fluids, started patient on regular diet as per her request  - abd pain - PRN morphine   - patient refused to speak to me today, requested only Dr. Gonzalez    #Chest pain  - trops ordered and are negative     Regular diet  no indication for dvt prophylaxis or PPI  Pending: pain control, diet tolerance, resolution/improvement in diarrhea

## 2023-01-26 ENCOUNTER — TRANSCRIPTION ENCOUNTER (OUTPATIENT)
Age: 58
End: 2023-01-26

## 2023-01-26 VITALS
TEMPERATURE: 99 F | SYSTOLIC BLOOD PRESSURE: 92 MMHG | DIASTOLIC BLOOD PRESSURE: 66 MMHG | RESPIRATION RATE: 18 BRPM | HEART RATE: 115 BPM

## 2023-01-26 LAB — BLD GP AB SCN SERPL QL: SIGNIFICANT CHANGE UP

## 2023-01-26 PROCEDURE — 99222 1ST HOSP IP/OBS MODERATE 55: CPT | Mod: GC

## 2023-01-26 PROCEDURE — 99232 SBSQ HOSP IP/OBS MODERATE 35: CPT

## 2023-01-26 RX ADMIN — Medication 125 MILLIGRAM(S): at 17:46

## 2023-01-26 RX ADMIN — SIMETHICONE 80 MILLIGRAM(S): 80 TABLET, CHEWABLE ORAL at 12:53

## 2023-01-26 RX ADMIN — SIMETHICONE 80 MILLIGRAM(S): 80 TABLET, CHEWABLE ORAL at 17:46

## 2023-01-26 RX ADMIN — SIMETHICONE 80 MILLIGRAM(S): 80 TABLET, CHEWABLE ORAL at 05:27

## 2023-01-26 RX ADMIN — Medication 125 MILLIGRAM(S): at 05:27

## 2023-01-26 RX ADMIN — Medication 125 MILLIGRAM(S): at 12:53

## 2023-01-26 NOTE — BH CONSULTATION LIAISON ASSESSMENT NOTE - DESCRIPTION
Patient was born in [place] and completed high school. Patient moved to [place] 30 years ago. Patient was  once and has two adult children. Patient  from her  11 years ago but is legally  still. 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 Patient currently lives with her older daughter in a private residence in [place]. Patient is currently unemployed.

## 2023-01-26 NOTE — PROGRESS NOTE ADULT - ASSESSMENT
58 yo female with a PMH of diverticulosis presented for diarrhea, found to have colitis 2/2 c. diff     #C. diff   #colitis  - patient has history of diverticulitis and perf in 2014  - 1/22 started on vanco PO 125mg q6 x 10days , end date 2/1/23  - transverse and sigmoid colitis on CT  - no recent abx use or hospitalizations  - GI consult appreciated, colonoscopy 1/30 outpatient  - GI pcr negative  - Poor po tolerance  - discontinued IV fluids, started patient on regular diet as per her request  - abd pain - PRN morphine   - patient is having improvement in bowel movements, she states they are more formed and she is tolerating po intake    #Chest pain  - trops ordered and are negative     Regular diet  no indication for dvt prophylaxis or PPI  Pending: d/c tomorrow

## 2023-01-26 NOTE — BH CONSULTATION LIAISON ASSESSMENT NOTE - NSBHCHARTREVIEWINVESTIGATE_PSY_A_CORE FT
< from: 12 Lead ECG (01.22.23 @ 11:02) >    Ventricular Rate 105 BPM    Atrial Rate 105 BPM    P-R Interval 122 ms    QRS Duration 92 ms    Q-T Interval 342 ms    QTC Calculation(Bazett) 452 ms    P Axis 43 degrees    R Axis -1 degrees    T Axis 17 degrees    Diagnosis Line Sinus tachycardia  Otherwise normal ECG    Confirmed by Ángel Nguyen ([id number]) on 1/23/2023 7:40:57 AM    < end of copied text >

## 2023-01-26 NOTE — DISCHARGE NOTE PROVIDER - NSDCFUSCHEDAPPT_GEN_ALL_CORE_FT
Max Mckeon  [facility] PreAdmits  Scheduled Appointment: 01/30/2023    [facility]  GASTRO Doc Off [id number] [facility]  Scheduled Appointment: 03/09/2023

## 2023-01-26 NOTE — BH CONSULTATION LIAISON ASSESSMENT NOTE - HPI (INCLUDE ILLNESS QUALITY, SEVERITY, DURATION, TIMING, CONTEXT, MODIFYING FACTORS, ASSOCIATED SIGNS AND SYMPTOMS)
Patient is a 57 year old  unemployed, currently single, recently ended long term relationship, legally still  to   from 10 years prior female domiciled with her older daughter in a private residence in [place] with PMH of diverticulitis and no PPH admitted for colitis secondary to C Diff with psychiatry consulted for mental health evaluation.    Upon approach, patient was seen lying in bed watching television on an iPad. She was interrupted and willing to have psychiatric interview. Patient was polite calm cooperative and spoke with full affect. Patient reported that she had recently felt  Patient is a 57 year old  unemployed, currently single, recently ended long term relationship, legally still  to   from 11 years prior female domiciled with her older daughter in a private residence in [place] with PMH of diverticulitis and no PPH admitted for colitis secondary to C Diff with psychiatry consulted for mental health evaluation.    Upon approach, patient was seen lying in bed watching television on an iPad. She was interrupted and willing to have psychiatric interview. Patient was polite calm cooperative and spoke with full affect. Patient reported that she had recently felt overwhelmed when as she had recently broken up with her long term boyfriend about a month ago with currently in a relationship with another woman. Complicated by her current medical admission and requiring pain medication, patient reported feeling significantly sad yesterday and felt behaviorally affected. Today patient reported feeling much better reporting that she had time to herself to think and start to cope with her situation reporting that she realizes that her health is more important and she has family like her daughter and ex- who care for her and came to see her in the hospital. Patient reported feeling distressed with recent partner reporting that he had not stayed long in the ED or the hospital room like his mind was elsewhere, making it clear that the relationship is truly over. Patient had insight into her distressing situation and cried in appropriate points of the interview. Patient reported that she has to cope with being single which she has not been in a long time. She reported that she would benefit from outpatient supportive psychotherapy and does believe she needs any medication. Patient denied any current depression, poor sleep, poor appetite, racing thoughts, current anxiety, any past manic episodes, any psychotic symptoms, or current or past suicidal or homicidal ideation.

## 2023-01-26 NOTE — BH CONSULTATION LIAISON ASSESSMENT NOTE - CURRENT MEDICATION
MEDICATIONS  (STANDING):  enoxaparin Injectable 40 milliGRAM(s) SubCutaneous every 24 hours  simethicone 80 milliGRAM(s) Chew every 6 hours  vancomycin    Solution 125 milliGRAM(s) Oral every 6 hours    MEDICATIONS  (PRN):  ondansetron Injectable 4 milliGRAM(s) IV Push every 6 hours PRN Nausea and/or Vomiting  oxycodone    5 mG/acetaminophen 325 mG 1 Tablet(s) Oral every 6 hours PRN Moderate Pain (4 - 6)

## 2023-01-26 NOTE — BH CONSULTATION LIAISON ASSESSMENT NOTE - NSBHATTESTCOMMENTATTENDFT_PSY_A_CORE
Ms Lopez is a 57 year old woman with no history of a psychiatric illness in a private residence in [place] with PMH of diverticulitis and no PPH admitted for colitis secondary to C Diff with psychiatry consulted for mental health evaluation. Ms Lopez is a 57 year old woman with no history of a psychiatric illness who was  admitted for colitis secondary to C Diff with psychiatry consulted for mental health evaluation.  Patient seems to have some feelings of dysphoria in the context of her distressing  social and medical stressors . She does not seem to have a major depressive disorder . She has no acute symptoms of cruz , psychosis or depression and denies having current suicidal ideations , intent or plan.   At this time, patient is not considered an imminent danger to herself or others and does not need inpatient psychiatric hospitalization. Patient will benefit from supportive psychotherapy to help her develop better coping skills and better frustration tolerance to address her stressors. There is no indication for any psychotropic medication for now . Upon discharge, patient can be referred to [facility] for outpatient psychiatric and psychotherapy services . patient informed of the plan and was agreeable.

## 2023-01-26 NOTE — BH CONSULTATION LIAISON ASSESSMENT NOTE - NSBHCONSULTRECOMMENDOTHER_PSY_A_CORE FT
1) Recommend behavioral interventions, and environmental modifications to help patient's orientation and help her feel safe   1) Recommend behavioral interventions, and environmental modifications to help patient's orientation and help her feel safe  2) Upon discharge, we recommend referring patient to [facility], [address] [place] [address], and Phone number: [phone]   3) No psychiatric contraindications to discharge   1) Recommend behavioral interventions, and environmental modifications to help patient's orientation and help her feel safe  2) Recommend calling  to come visit patient  3) Upon discharge, we recommend referring patient to [facility], [address] [place] [address], and Phone number: [phone]   4) No psychiatric contraindications to discharge

## 2023-01-26 NOTE — BH CONSULTATION LIAISON ASSESSMENT NOTE - NSBHCHARTREVIEWVS_PSY_A_CORE FT
Vital Signs Last 24 Hrs  T(C): 37.4 (25 Jan 2023 19:56), Max: 37.4 (25 Jan 2023 19:56)  T(F): 99.4 (25 Jan 2023 19:56), Max: 99.4 (25 Jan 2023 19:56)  HR: 101 (25 Jan 2023 19:56) (97 - 101)  BP: 119/62 (25 Jan 2023 19:56) (116/67 - 119/62)  BP(mean): --  RR: 18 (25 Jan 2023 19:56) (18 - 18)  SpO2: --

## 2023-01-26 NOTE — BH CONSULTATION LIAISON ASSESSMENT NOTE - NSBHCONSULTFOLLOWAFTERCARE_PSY_A_CORE FT
Upon discharge, we recommend referring patient to [facility], [address] [place] [address], and Phone number: [phone]

## 2023-01-26 NOTE — DISCHARGE NOTE PROVIDER - CARE PROVIDER_API CALL
Max Mckeon)  Gastroenterology; Internal Medicine  [address] [place] [address]  Phone: [phone]  Fax: [phone]  Scheduled Appointment: 01/30/2023    randy rodriguez  Phone: (   )    -  Fax: (   )    -  Follow Up Time: 1 week

## 2023-01-26 NOTE — BH CONSULTATION LIAISON ASSESSMENT NOTE - RISK ASSESSMENT
Risk factors: Recent acute psychosocial event, current acute illness, no outpatient treatment    Protective factors: Good insight, no depressive or suicidal symptoms or history of suicidal ideation or attempt, supportive family and friends

## 2023-01-26 NOTE — PROGRESS NOTE ADULT - ASSESSMENT
58 yo woman with a PMH of diverticulosis presented for diarrhea, found to have colitis 2/2 c. diff     # Limit Labs - they are nl    # C. diff colitis  not sure of etiology: no recent abx; no recent hosp; last scope 9/2022  pt thinks she may have gotten it from her ex-boyfriend, but she could/would not explain further  vanco PO 125mg q6 x10 days (1/22-2/1)  transverse and sigmoid colitis on CT  GI consult appreciated  GI pcr negative  diet: reg  IVFs: stop  simethicone 80 po q6  abd pain: percocet 5/325 po q6 prn - will prescribe short course upon d/c  avoid imodium  avoid PPI    # Chest pain - cardiac dz ruled out; likely just musculoskeletal chest pain  EKG nl  trops neg    # poss personality disorder?; likely adjustment disorder  has 2 children: oldest dtr 27 w/ children, lives in [facility]) - pt visits at times (last Oct 2022); younger dtr 23 (has anxiety issues) lives nearby and has visited mother in hospital; pt was recently in a relationship w/ a man for 11 yrs and he broke up the relationship and is seeing another woman; they pt is having a hard time w/ the break up; the boyfriend visited pt in hospital last night, which seems to have made things emotionally worse for her; pt has seen psych in remote past  pt would like to talk w/ psych now  she is NOT suicidal or homicidal    # DVT ppx: LMWH    Dispo: po vanco; diet; psych  eventually, pt will go home w/ no needs - anticipate d/c te

## 2023-01-26 NOTE — DISCHARGE NOTE PROVIDER - PROVIDER TOKENS
PROVIDER:[TOKEN:[[id number]],SCHEDULEDAPPT:[01/30/2023]],FREE:[LAST:[michael],FIRST:[randy],PHONE:[(   )    -],FAX:[(   )    -],FOLLOWUP:[1 week]]

## 2023-01-26 NOTE — DISCHARGE NOTE PROVIDER - NSDCMRMEDTOKEN_GEN_ALL_CORE_FT
Multi Vitamin+:   Probiotic Formula:   vancomycin 125 mg oral capsule: 1 cap(s) orally every 6 hours    Multi Vitamin+:   oxycodone-acetaminophen 5 mg-325 mg oral tablet: 1 tab(s) orally every 6 hours, As needed, Moderate Pain (4 - 6)  Probiotic Formula:   simethicone 80 mg oral tablet, chewable: 1 tab(s) orally every 6 hours  vancomycin 125 mg oral capsule: 1 cap(s) orally every 6 hours

## 2023-01-26 NOTE — DISCHARGE NOTE PROVIDER - NSDCCPCAREPLAN_GEN_ALL_CORE_FT
PRINCIPAL DISCHARGE DIAGNOSIS  Diagnosis: Colitis  Assessment and Plan of Treatment: You came to the hospital with diarrhea.  You were found to be positive for c-diff infection and you were started on oral vancomycin.  You should continue this antibiotic until 2/1/23.    You are having less frequent bowel movements and tolerating food better.    Please follow up with your primary care doctor within one week of discharge.  Please follow up with the GI doctor, you have an appointment on 1/30/23.       PRINCIPAL DISCHARGE DIAGNOSIS  Diagnosis: Colitis  Assessment and Plan of Treatment: You came to the hospital with diarrhea.  You were found to be positive for c-diff infection and you were started on oral vancomycin.  You should continue this antibiotic until 2/1/23.    You are having less frequent bowel movements and tolerating food better.    Please follow up with your primary care doctor within one week of discharge.  Please follow up with the GI doctor, you have an appointment for colonoscopy on 1/30/23.  You saw psychiatry here during your stay and they would like to see you outpatient: [facility], [address] [place] [address], and Phone number: [phone]

## 2023-01-26 NOTE — BH CONSULTATION LIAISON ASSESSMENT NOTE - SUMMARY
Patient is a 57 year old  unemployed, currently single, recently ended long term relationship, legally still  to   from 10 years prior female domiciled with her older daughter in a private residence in [place] with PMH of diverticulitis and no PPH admitted for colitis secondary to C Diff with psychiatry consulted for mental health evaluation.    Patient denied any current depression, poor sleep, poor appetite, racing thoughts, current anxiety, any past manic episodes, any psychotic symptoms, or current or past suicidal or homicidal ideation.   Patient is a 57 year old  unemployed, currently single, recently ended long term relationship, legally still  to   from 10 years prior female domiciled with her older daughter in a private residence in [place] with PMH of diverticulitis and no PPH admitted for colitis secondary to C Diff with psychiatry consulted for mental health evaluation.    Patient with no relevant past psychiatric history was seen after being significantly distressed after a severe recent psychosocial stressor followed by an immediate hospitalization associated with significant pain. With the medical treatment continuing and her pain resolving, patient appears significantly improved and speaking with good insight about her need to learn how to cope with her new social situation and recognizing her available resources. Patient spoke about her supportive family and friends and requested that she be connected to an outpatient psychiatrist. This would be beneficial and patient can be referred to [facility] for supportive psychotherapy. Patient denied any current depression, poor sleep, poor appetite, racing thoughts, current anxiety, any past manic episodes, any psychotic symptoms, or current or past suicidal or homicidal ideation. Patient is at any acute risk to herself or others and would not benefit from Inpatient admission.   Patient is a 57 year old  unemployed, currently single, recently ended long term relationship, legally still  to   from 10 years prior female domiciled with her older daughter in a private residence in [place] with PMH of diverticulitis and no PPH admitted for colitis secondary to C Diff with psychiatry consulted for mental health evaluation.    Patient with no relevant past psychiatric history was seen after being significantly distressed after a severe recent psychosocial stressor followed by an immediate hospitalization associated with significant pain. With the medical treatment continuing and her pain resolving, patient appears significantly improved and speaking with good insight about her need to learn how to cope with her new social situation and recognizing her available resources. Patient spoke about her supportive family and friends and requested that she be connected to an outpatient psychiatrist. This would be beneficial and patient can be referred to [facility] for supportive psychotherapy. Patient denied any current depression, poor sleep, poor appetite, racing thoughts, current anxiety, any past manic episodes, any psychotic symptoms, or current or past suicidal or homicidal ideation. Patient is at any acute risk to herself or others and would not benefit from Inpatient admission. Patient should follow up with outpatient Psychiatry and was given referral and contact information to [facility], [address] [place] [address], and Phone number: [phone]. No psychiatric contraindications to discharge.   Patient is a 57 year old  unemployed, currently single, recently ended long term relationship, legally still  to   from 10 years prior female domiciled with her older daughter in a private residence in [place] with PMH of diverticulitis and no PPH admitted for colitis secondary to C Diff with psychiatry consulted for mental health evaluation.    Patient with no relevant past psychiatric history was seen after being significantly distressed after a severe recent psychosocial stressor followed by an immediate hospitalization associated with significant pain. With the medical treatment continuing and her pain resolving, patient appears significantly improved and speaking with good insight about her need to learn how to cope with her new social situation and recognizing her available resources. Patient spoke about her supportive family and friends and requested that she be connected to an outpatient psychiatrist. This would be beneficial and patient can be referred to [facility] for supportive psychotherapy. Patient denied any current depression, poor sleep, poor appetite, racing thoughts, current anxiety, any past manic episodes, any psychotic symptoms, or current or past suicidal or homicidal ideation. Patient is at any acute risk to herself or others and would not benefit from Inpatient admission. Patient should follow up with outpatient Psychiatry and was given referral and contact information to [facility], [address] [place] [address], and Phone number: [phone]. No psychiatric contraindications to discharge.

## 2023-01-26 NOTE — BH CONSULTATION LIAISON ASSESSMENT NOTE - OTHER
Currently single, recently ended long term relationship, legally still  to   from 10 years prior Cried on occasional, but congruent to conversation Deferred "Overwhelmed."

## 2023-01-26 NOTE — DISCHARGE NOTE PROVIDER - HOSPITAL COURSE
56 yo female with a PMH of diverticulosis presented for diarrhea, found to have colitis 2/2 c. diff     #C. diff   #colitis  - patient has history of diverticulitis and perf in 2014  - 1/22 started on vanco PO 125mg q6 x 10days , end date 2/1/23  - transverse and sigmoid colitis on CT  - no recent abx use or hospitalizations  - GI consult appreciated, colonoscopy 1/30 outpatient  - GI pcr negative  - Poor po tolerance  - discontinued IV fluids, started patient on regular diet as per her request  - abd pain - PRN morphine   - patient is having improvement in bowel movements, she states they are more formed and she is tolerating po intake   58 yo female with a PMH of diverticulosis presented for diarrhea, found to have colitis 2/2 c. diff     #C. diff   #colitis  - patient has history of diverticulitis and perf in 2014  - 1/22 started on vanco PO 125mg q6 x 10days , end date 2/1/23  - transverse and sigmoid colitis on CT  - no recent abx use or hospitalizations  - GI consult appreciated, colonoscopy 1/30 outpatient  - GI pcr negative  - Poor po tolerance  - discontinued IV fluids, started patient on regular diet as per her request  - abd pain - PRN morphine   - patient is having improvement in bowel movements, she states they are more formed and she is tolerating po intake

## 2023-01-26 NOTE — BH CONSULTATION LIAISON ASSESSMENT NOTE - NSBHCHARTREVIEWLAB_PSY_A_CORE FT
11.8   5.23  )-----------( 203      ( 25 Jan 2023 06:33 )             34.7     01-25    141  |  106  |  5<L>  ----------------------------<  111<H>  4.2   |  21  |  0.5<L>    Ca    8.4      25 Jan 2023 06:33  Mg     1.8     01-25    TPro  4.9<L>  /  Alb  2.9<L>  /  TBili  0.3  /  DBili  x   /  AST  29  /  ALT  29  /  AlkPhos  108  01-25

## 2023-01-27 ENCOUNTER — TRANSCRIPTION ENCOUNTER (OUTPATIENT)
Age: 58
End: 2023-01-27

## 2023-01-27 PROCEDURE — 99239 HOSP IP/OBS DSCHRG MGMT >30: CPT

## 2023-01-27 RX ORDER — OXYCODONE AND ACETAMINOPHEN 5; 325 MG/1; MG/1
1 TABLET ORAL
Qty: 0 | Refills: 0 | DISCHARGE
Start: 2023-01-27

## 2023-01-27 RX ORDER — SIMETHICONE 80 MG/1
1 TABLET, CHEWABLE ORAL
Qty: 120 | Refills: 0
Start: 2023-01-27 | End: 2023-02-25

## 2023-01-27 RX ORDER — OXYCODONE AND ACETAMINOPHEN 5; 325 MG/1; MG/1
1 TABLET ORAL
Qty: 21 | Refills: 0
Start: 2023-01-27 | End: 2023-02-02

## 2023-01-27 RX ADMIN — Medication 125 MILLIGRAM(S): at 12:47

## 2023-01-27 RX ADMIN — SIMETHICONE 80 MILLIGRAM(S): 80 TABLET, CHEWABLE ORAL at 01:01

## 2023-01-27 RX ADMIN — SIMETHICONE 80 MILLIGRAM(S): 80 TABLET, CHEWABLE ORAL at 12:47

## 2023-01-27 RX ADMIN — Medication 125 MILLIGRAM(S): at 01:01

## 2023-01-27 RX ADMIN — Medication 125 MILLIGRAM(S): at 06:20

## 2023-01-27 RX ADMIN — SIMETHICONE 80 MILLIGRAM(S): 80 TABLET, CHEWABLE ORAL at 06:18

## 2023-01-27 NOTE — DISCHARGE NOTE NURSING/CASE MANAGEMENT/SOCIAL WORK - NSDCPEFALRISK_GEN_ALL_CORE
For information on Fall & Injury Prevention, visit: [email or web address] OR  [email or web address] OR  https://www.cdc.gov/steadi/patient.html

## 2023-01-27 NOTE — PROGRESS NOTE ADULT - ASSESSMENT
56 yo female with a PMH of diverticulosis presented for diarrhea, found to have colitis 2/2 c. diff     #C. diff   #colitis  - patient has history of diverticulitis and perf in 2014  - 1/22 started on vanco PO 125mg q6 x 10days , end date 2/1/23  - transverse and sigmoid colitis on CT  - no recent abx use or hospitalizations  - GI consult appreciated, colonoscopy 1/30 outpatient  - GI pcr negative  - Poor po tolerance  - discontinued IV fluids, started patient on regular diet as per her request  - abd pain - percocet  - started simethicone   - patient is having improvement in bowel movements, she states they are more formed and she is tolerating po intake, she is ready for discharge today and is hoping to     #Chest pain  - trops ordered and are negative     Regular diet  no indication for dvt prophylaxis or PPI  Pending: d/c today     58 yo female with a PMH of diverticulosis presented for diarrhea, found to have colitis 2/2 c. diff     #C. diff   #colitis  - patient has history of diverticulitis and perf in 2014  - 1/22 started on vanco PO 125mg q6 x 10days , end date 2/1/23  - transverse and sigmoid colitis on CT  - no recent abx use or hospitalizations  - GI consult appreciated, colonoscopy 1/30 outpatient  - GI pcr negative  - Poor po tolerance  - discontinued IV fluids, started patient on regular diet as per her request  - abd pain - percocet  - started simethicone   - patient is having improvement in bowel movements, she states they are more formed and she is tolerating po intake, she is ready for discharge today     #Chest pain  - trops ordered and are negative     Regular diet  no indication for dvt prophylaxis or PPI  Pending: d/c today

## 2023-01-27 NOTE — DISCHARGE NOTE NURSING/CASE MANAGEMENT/SOCIAL WORK - PATIENT PORTAL LINK FT
You can access the FollowMyHealth Patient Portal offered by [facility] by registering at the following website: [email or web address]. By joining [employer]’s FollowMyHealth portal, you will also be able to view your health information using other applications (apps) compatible with our system.
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
## 2023-01-27 NOTE — ED ADULT NURSE NOTE - HAVE YOU HAD COVID IN THE LAST 60 DAYS?
For information on Fall & Injury Prevention, visit: [email or web address] OR  [email or web address] OR  https://www.cdc.gov/steadi/patient.html No

## 2023-01-27 NOTE — DISCHARGE NOTE NURSING/CASE MANAGEMENT/SOCIAL WORK - NSDCPEPTCAREGIVEDUMATLIST _GEN_ALL_CORE
"Chief Complaint   Patient presents with     Abdominal Pain       Initial /54  Pulse 108  Temp 99.5  F (37.5  C) (Tympanic)  Resp 18  Ht 5' 2\" (1.575 m)  Wt 208 lb (94.3 kg)  SpO2 98%  BMI 38.04 kg/m2 Estimated body mass index is 38.04 kg/(m^2) as calculated from the following:    Height as of this encounter: 5' 2\" (1.575 m).    Weight as of this encounter: 208 lb (94.3 kg).  Medication Reconciliation: complete   Carly Perez      "
Influenza Vaccination

## 2023-01-27 NOTE — PROGRESS NOTE ADULT - PROVIDER SPECIALTY LIST ADULT
Internal Medicine
Gastroenterology
Internal Medicine
Gastroenterology
Gastroenterology
Hospitalist
Internal Medicine

## 2023-01-27 NOTE — PROGRESS NOTE ADULT - SUBJECTIVE AND OBJECTIVE BOX
SUBJECTIVE:  Patient is a 57y old Female who presents with a chief complaint of diarrhea (25 Jan 2023 13:55)   Colitis     Today is hospital day 6d. There are no new issues or overnight events.     HPI  HPI:  56 yo female kth diverticulosis prestented for diarrhea.   The patient stated that the diarrhea started about 8 days ago. The diarrhea is water, non bloody and is associated with lower abdominal  crampy pain.   The patient stated that she has about 5-6 episodes a day, and she is not able to tolerate po intake, and she had few episodes of vomiting.  She denies any travel, sick contacts or recent abx use. However she endorses chills and subjective fever, but denies any chronic diarrhea   She was advised by her GI ( dr. Mckeon) to visit the ed.   In the ed her ct scan showed colitis     (21 Jan 2023 10:40)      PAST MEDICAL & SURGICAL HISTORY  Osteoarthritis    Diverticulitis    GERD (gastroesophageal reflux disease)    History of umbilical hernia repair    History of bladder surgery    Cyst of neck      ALLERGIES:  aspirin (Short breath)  erythromycin (Other)  penicillins (Unknown)  sulfa  patient states that stitches needed to be re-opened after suture with sulfa material (Other)    MEDICATIONS:  HOME MEDICATIONS  Multi Vitamin+:   Probiotic Formula:   vancomycin 125 mg oral capsule: 1 cap(s) orally every 6 hours     STANDING MEDICATIONS  enoxaparin Injectable 40 milliGRAM(s) SubCutaneous every 24 hours  simethicone 80 milliGRAM(s) Chew every 6 hours  vancomycin    Solution 125 milliGRAM(s) Oral every 6 hours    PRN MEDICATIONS  ondansetron Injectable 4 milliGRAM(s) IV Push every 6 hours PRN  oxycodone    5 mG/acetaminophen 325 mG 1 Tablet(s) Oral every 6 hours PRN    VITALS:   T(C): 37.4 (01-25-23 @ 19:56), Max: 37.4 (01-25-23 @ 19:56)  T(F): 99.4 (01-25-23 @ 19:56), Max: 99.4 (01-25-23 @ 19:56)  HR: 101 (01-25-23 @ 19:56) (97 - 101)  BP: 119/62 (01-25-23 @ 19:56) (116/67 - 119/62)  BP(mean): --  ABP: --  ABP(mean): --  RR: 18 (01-25-23 @ 19:56) (18 - 18)  SpO2: --  LABS:                        11.8   5.23  )-----------( 203      ( 25 Jan 2023 06:33 )             34.7     01-25    141  |  106  |  5<L>  ----------------------------<  111<H>  4.2   |  21  |  0.5<L>    Ca    8.4      25 Jan 2023 06:33  Mg     1.8     01-25    TPro  4.9<L>  /  Alb  2.9<L>  /  TBili  0.3  /  DBili  x   /  AST  29  /  ALT  29  /  AlkPhos  108  01-25        I&O's Detail                RADIOLOGY:  EKG  12 Lead ECG:   Ventricular Rate 105 BPM    Atrial Rate 105 BPM    P-R Interval 122 ms    QRS Duration 92 ms    Q-T Interval 342 ms    QTC Calculation(Bazett) 452 ms    P Axis 43 degrees    R Axis -1 degrees    T Axis 17 degrees    Diagnosis Line Sinus tachycardia  Otherwise normal ECG    Confirmed by Ángel Nguyen ([id number]) on 1/23/2023 7:40:57 AM (01-22-23 @ 11:02)      Physical Exam:  General: no acute distress, lying in bed  Head: normocephalic and atraumatic  Neck: supple  Heart: regular rate and rhythm, S1 and S2 normal, no murmurs, rubs or gallops noted on exam  Lungs: Symmetric chest expansion bilaterally, clear lungs bilaterally, no wheezes rhonchi or crackles heard  Abdomen: Bowel sounds present, non tender on light and deep palpation  Extremities: No edema, no clubbing or cyanosis notes
SUBJECTIVE:  Patient is a 57y old Female who presents with a chief complaint of diarrhea (26 Jan 2023 15:34)   Colitis     Today is hospital day 7d. There are no new issues or overnight events.     HPI  HPI:  56 yo female kth diverticulosis prestented for diarrhea.   The patient stated that the diarrhea started about 8 days ago. The diarrhea is water, non bloody and is associated with lower abdominal  crampy pain.   The patient stated that she has about 5-6 episodes a day, and she is not able to tolerate po intake, and she had few episodes of vomiting.  She denies any travel, sick contacts or recent abx use. However she endorses chills and subjective fever, but denies any chronic diarrhea   She was advised by her GI ( dr. Mckeon) to visit the ed.   In the ed her ct scan showed colitis     (21 Jan 2023 10:40)      PAST MEDICAL & SURGICAL HISTORY  Osteoarthritis    Diverticulitis    GERD (gastroesophageal reflux disease)    History of umbilical hernia repair    History of bladder surgery    Cyst of neck      ALLERGIES:  aspirin (Short breath)  erythromycin (Other)  penicillins (Unknown)  sulfa  patient states that stitches needed to be re-opened after suture with sulfa material (Other)    MEDICATIONS:  HOME MEDICATIONS  Multi Vitamin+:   Probiotic Formula:   vancomycin 125 mg oral capsule: 1 cap(s) orally every 6 hours     STANDING MEDICATIONS  enoxaparin Injectable 40 milliGRAM(s) SubCutaneous every 24 hours  simethicone 80 milliGRAM(s) Chew every 6 hours  vancomycin    Solution 125 milliGRAM(s) Oral every 6 hours    PRN MEDICATIONS  ondansetron Injectable 4 milliGRAM(s) IV Push every 6 hours PRN  oxycodone    5 mG/acetaminophen 325 mG 1 Tablet(s) Oral every 6 hours PRN    VITALS:   T(C): 37 (01-26-23 @ 21:26), Max: 37 (01-26-23 @ 21:26)  T(F): 98.6 (01-26-23 @ 21:26), Max: 98.6 (01-26-23 @ 21:26)  HR: 115 (01-26-23 @ 21:26) (110 - 115)  BP: 92/66 (01-26-23 @ 21:26) (88/51 - 92/66)  BP(mean): --  ABP: --  ABP(mean): --  RR: 18 (01-26-23 @ 21:26) (18 - 18)  SpO2: --  LABS:              I&O's Detail                RADIOLOGY:  EKG  12 Lead ECG:   Ventricular Rate 105 BPM    Atrial Rate 105 BPM    P-R Interval 122 ms    QRS Duration 92 ms    Q-T Interval 342 ms    QTC Calculation(Bazett) 452 ms    P Axis 43 degrees    R Axis -1 degrees    T Axis 17 degrees    Diagnosis Line Sinus tachycardia  Otherwise normal ECG    Confirmed by Ángel Nguyen ([id number]) on 1/23/2023 7:40:57 AM (01-22-23 @ 11:02)      Physical Exam:  General: no acute distress, lying in bed  Head: normocephalic and atraumatic  Neck: supple  Heart: regular rate and rhythm, S1 and S2 normal, no murmurs, rubs or gallops noted on exam  Lungs: Symmetric chest expansion bilaterally, clear lungs bilaterally, no wheezes rhonchi or crackles heard  Abdomen: Bowel sounds present, non tender on light and deep palpation  Extremities: No edema, no clubbing or cyanosis notes
  EFREN HONG  57y  Female  ***My note supersedes ALL resident notes that I sign.  My corrections for their notes are in my note.***    I can be reached directly on [employer]. My office number is [phone]. My personal cell number is [phone].    INTERVAL EVENTS: Here for f/u of c diff. Pt is much improved today. Pt had 3-4 BMs, but stool starting to from now. Pt walks to BR oK. Pt emotionally better today, not crying today. Pt would still like to talk w/ psych, but feels better. Pt feels she'll be OK to go home tomorrow.    T(F): 99.4 (01-25-23 @ 19:56), Max: 99.4 (01-25-23 @ 19:56)  HR: 101 (01-25-23 @ 19:56) (97 - 101)  BP: 119/62 (01-25-23 @ 19:56) (116/67 - 119/62)  RR: 18 (01-25-23 @ 19:56) (18 - 18)  SpO2: --    Gen: NAD  HEENT: PERRL, EOMI, mouth clr, nose clr  Neck: no nodes, no JVD, thyroid nl  lungs: clr  hrt: s1 s2 rrr no murmur  abd: soft (not rigid, no rebound), no distension, no pain; no HS megaly  ext: no edema, no c/c  neuro: aa ox3, cn intact, can move all 4 ext    LABS:                      11.8    (    89.0   5.23  )-----------( ---------      203      ( 25 Jan 2023 06:33 )             34.7    (    12.9     RADIOLOGY & ADDITIONAL TESTS:    MEDICATIONS:  vancomycin    Solution 125 milliGRAM(s) Oral every 6 hours    enoxaparin Injectable 40 milliGRAM(s) SubCutaneous every 24 hours  ondansetron Injectable 4 milliGRAM(s) IV Push every 6 hours PRN  oxycodone    5 mG/acetaminophen 325 mG 1 Tablet(s) Oral every 6 hours PRN  simethicone 80 milliGRAM(s) Chew every 6 hours  
Gastroenterology progress note:     Patient is a 57y old  Female who presents with a chief complaint of diarrhea (21 Jan 2023 17:52)       Admitted on: 01-20-23    We are following the patient for colitis      Interval History:  still c/o watery diarrhea and unable to tolerate food due to cramping and nausea        PAST MEDICAL & SURGICAL HISTORY:  Osteoarthritis      Diverticulitis      GERD (gastroesophageal reflux disease)      History of umbilical hernia repair      History of bladder surgery      Cyst of neck          MEDICATIONS  (STANDING):  enoxaparin Injectable 40 milliGRAM(s) SubCutaneous every 24 hours  sodium chloride 0.9%. 1000 milliLiter(s) (75 mL/Hr) IV Continuous <Continuous>    MEDICATIONS  (PRN):  acetaminophen     Tablet .. 650 milliGRAM(s) Oral every 6 hours PRN Temp greater or equal to 38C (100.4F), Moderate Pain (4 - 6)  ketorolac   Injectable 15 milliGRAM(s) IV Push every 8 hours PRN Severe Pain (7 - 10)  morphine  - Injectable 1 milliGRAM(s) IV Push every 4 hours PRN Pain (4-10)  ondansetron Injectable 4 milliGRAM(s) IV Push every 6 hours PRN Nausea and/or Vomiting      Allergies  aspirin (Short breath)  erythromycin (Other)  penicillins (Unknown)  sulfa  patient states that stitches needed to be re-opened after suture with sulfa material (Other)      Review of Systems:   Cardiovascular:  No Chest Pain, No Palpitations  Respiratory:  No Cough, No Dyspnea  Gastrointestinal:  As described in HPI    Physical Examination:  T(C): 37.9 (01-22-23 @ 13:37), Max: 37.9 (01-22-23 @ 13:37)  HR: 111 (01-22-23 @ 13:37) (93 - 111)  BP: 106/57 (01-22-23 @ 13:37) (104/64 - 106/57)  RR: 20 (01-22-23 @ 13:37) (18 - 20)  SpO2: --      01-21-23 @ 07:01  -  01-22-23 @ 07:00  --------------------------------------------------------  IN: 0 mL / OUT: 10 mL / NET: -10 mL      Constitutional: No acute distress.  Respiratory:  No signs of respiratory distress. Lung sounds are clear bilaterally.  Cardiovascular:  S1 S2, Regular rate and rhythm.  Abdominal: Abdomen is soft, symmetric, and non-tender without distention.  Skin: No rashes, No Jaundice.        Data:                        12.0   10.52 )-----------( 170      ( 22 Jan 2023 09:53 )             34.9     Hgb trend:  12.0  01-22-23 @ 09:53  13.2  01-20-23 @ 19:39        01-22    139  |  106  |  10  ----------------------------<  102<H>  3.6   |  24  |  0.5<L>    Ca    8.5      22 Jan 2023 09:53    TPro  4.8<L>  /  Alb  2.8<L>  /  TBili  0.5  /  DBili  x   /  AST  15  /  ALT  19  /  AlkPhos  90  01-22    Liver panel trend:  TBili 0.5   /   AST 15   /   ALT 19   /   AlkP 90   /   Tptn 4.8   /   Alb 2.8    /   DBili --      01-22  TBili 0.9   /   AST 15   /   ALT 14   /   AlkP 84   /   Tptn 5.6   /   Alb 3.5    /   DBili --      01-20             Radiology:      
Gastroenterology progress note:     Patient is a 57y old  Female who presents with a chief complaint of diarrhea (23 Jan 2023 11:21)       Admitted on: 01-20-23    We are following the patient for: diarrhea        Interval History:    No acute events overnight.   on clear liquid diet  having 3-4 liquid bm    denies abdominal pain       PAST MEDICAL & SURGICAL HISTORY:  Osteoarthritis      Diverticulitis      GERD (gastroesophageal reflux disease)      History of umbilical hernia repair      History of bladder surgery      Cyst of neck          MEDICATIONS  (STANDING):  enoxaparin Injectable 40 milliGRAM(s) SubCutaneous every 24 hours  sodium chloride 0.9%. 1000 milliLiter(s) (75 mL/Hr) IV Continuous <Continuous>  vancomycin    Solution 125 milliGRAM(s) Oral every 6 hours    MEDICATIONS  (PRN):  acetaminophen     Tablet .. 650 milliGRAM(s) Oral every 6 hours PRN Temp greater or equal to 38C (100.4F), Moderate Pain (4 - 6)  morphine  - Injectable 1 milliGRAM(s) IV Push every 4 hours PRN Pain (4-10)  ondansetron Injectable 4 milliGRAM(s) IV Push every 6 hours PRN Nausea and/or Vomiting      Allergies  aspirin (Short breath)  erythromycin (Other)  penicillins (Unknown)  sulfa  patient states that stitches needed to be re-opened after suture with sulfa material (Other)      Review of Systems:   Cardiovascular:  No Chest Pain, No Palpitations  Respiratory:  No Cough, No Dyspnea  Gastrointestinal:  As described in HPI  Skin:  No Skin Lesions, No Jaundice  Neuro:  No Syncope, No Dizziness    Physical Examination:  T(C): 36.8 (01-23-23 @ 04:39), Max: 38.1 (01-23-23 @ 01:44)  HR: 105 (01-23-23 @ 04:39) (105 - 111)  BP: 128/59 (01-23-23 @ 04:39) (106/57 - 128/59)  RR: 18 (01-23-23 @ 04:39) (18 - 20)  SpO2: --        GENERAL: AAOx3, no acute distress.  HEAD:  Atraumatic, Normocephalic  EYES: conjunctiva and sclera clear  NECK: Supple, no JVD or thyromegaly  CHEST/LUNG: Clear to auscultation bilaterally; No wheeze, rhonchi, or rales  HEART: Regular rate and rhythm; normal S1, S2, No murmurs.  ABDOMEN: Soft, nontender, nondistended; Bowel sounds present  NEUROLOGY: No asterixis or tremor.   SKIN: Intact, no jaundice     Data:                        12.0   10.52 )-----------( 170      ( 22 Jan 2023 09:53 )             34.9     Hgb trend:  12.0  01-22-23 @ 09:53  13.2  01-20-23 @ 19:39        01-22    139  |  106  |  10  ----------------------------<  102<H>  3.6   |  24  |  0.5<L>    Ca    8.5      22 Jan 2023 09:53    TPro  4.8<L>  /  Alb  2.8<L>  /  TBili  0.5  /  DBili  x   /  AST  15  /  ALT  19  /  AlkPhos  90  01-22    Liver panel trend:  TBili 0.5   /   AST 15   /   ALT 19   /   AlkP 90   /   Tptn 4.8   /   Alb 2.8    /   DBili --      01-22  TBili 0.9   /   AST 15   /   ALT 14   /   AlkP 84   /   Tptn 5.6   /   Alb 3.5    /   DBili --      01-20         
Patient is a 57y old  Female who presents with a chief complaint of diarrhea (22 Jan 2023 13:59)      Patient seen and examined at bedside.  Patient reports having chest pressure overnight, not notifying anyone.  She states she does not know how long it lasted and feel back to sleep.  Denies ever feeling this before denies any associated shortness of breath   ALLERGIES:  aspirin (Short breath)  erythromycin (Other)  penicillins (Unknown)  sulfa  patient states that stitches needed to be re-opened after suture with sulfa material (Other)    MEDICATIONS:  acetaminophen     Tablet .. 650 milliGRAM(s) Oral every 6 hours PRN  enoxaparin Injectable 40 milliGRAM(s) SubCutaneous every 24 hours  ketorolac   Injectable 15 milliGRAM(s) IV Push every 8 hours PRN  morphine  - Injectable 1 milliGRAM(s) IV Push every 4 hours PRN  ondansetron Injectable 4 milliGRAM(s) IV Push every 6 hours PRN  sodium chloride 0.9%. 1000 milliLiter(s) IV Continuous <Continuous>  vancomycin    Solution 125 milliGRAM(s) Oral every 6 hours    Vital Signs Last 24 Hrs  T(F): 100.2 (22 Jan 2023 13:37), Max: 100.2 (22 Jan 2023 13:37)  HR: 111 (22 Jan 2023 13:37) (93 - 111)  BP: 106/57 (22 Jan 2023 13:37) (104/64 - 106/57)  RR: 20 (22 Jan 2023 13:37) (18 - 20)  SpO2: --  I&O's Summary    21 Jan 2023 07:01  -  22 Jan 2023 07:00  --------------------------------------------------------  IN: 0 mL / OUT: 10 mL / NET: -10 mL        PHYSICAL EXAM:  General: NAD, A/O x 3  ENT: MMM  Neck: Supple, No JVD  Lungs: Clear to auscultation bilaterally  Cardio: RRR, S1/S2, No murmurs  Abdomen: Soft, Nontender, Nondistended; Bowel sounds present  Extremities: No cyanosis, No edema    LABS:                        12.0   10.52 )-----------( 170      ( 22 Jan 2023 09:53 )             34.9     01-22    139  |  106  |  10  ----------------------------<  102  3.6   |  24  |  0.5    Ca    8.5      22 Jan 2023 09:53    TPro  4.8  /  Alb  2.8  /  TBili  0.5  /  DBili  x   /  AST  15  /  ALT  19  /  AlkPhos  90  01-22        Lactate, Blood: 1.1 mmol/L (01-20 @ 19:39)            19:13 - VBG - pH: 7.36  | pCO2: 44    | pO2: 34    | Lactate: 1.20                 Urinalysis Basic - ( 20 Jan 2023 22:29 )    Color: Yellow / Appearance: Clear / SG: >1.050 / pH: x  Gluc: x / Ketone: Small  / Bili: Negative / Urobili: <2 mg/dL   Blood: x / Protein: 100 mg/dL / Nitrite: Negative   Leuk Esterase: Negative / RBC: 6 /HPF / WBC 1 /HPF   Sq Epi: x / Non Sq Epi: 8 /HPF / Bacteria: Negative        COVID-19 PCR: NotDetec (01-20-23 @ 22:08)      RADIOLOGY & ADDITIONAL TESTS:    Care Discussed with Consultants/Other Providers: 
SUBJECTIVE:  Patient is a 57y old Female who presents with a chief complaint of diarrhea (22 Jan 2023 14:56)   Colitis     Today is hospital day 3d. There are no new issues or overnight events.     HPI  HPI:  58 yo female kth diverticulosis prestented for diarrhea.   The patient stated that the diarrhea started about 8 days ago. The diarrhea is water, non bloody and is associated with lower abdominal  crampy pain.   The patient stated that she has about 5-6 episodes a day, and she is not able to tolerate po intake, and she had few episodes of vomiting.  She denies any travel, sick contacts or recent abx use. However she endorses chills and subjective fever, but denies any chronic diarrhea   She was advised by her GI ( dr. Mckeon) to visit the ed.   In the ed her ct scan showed colitis     (21 Jan 2023 10:40)      PAST MEDICAL & SURGICAL HISTORY  Osteoarthritis    Diverticulitis    GERD (gastroesophageal reflux disease)    History of umbilical hernia repair    History of bladder surgery    Cyst of neck      ALLERGIES:  aspirin (Short breath)  erythromycin (Other)  penicillins (Unknown)  sulfa  patient states that stitches needed to be re-opened after suture with sulfa material (Other)    MEDICATIONS:  HOME MEDICATIONS  Multi Vitamin+:   Probiotic Formula:     STANDING MEDICATIONS  enoxaparin Injectable 40 milliGRAM(s) SubCutaneous every 24 hours  sodium chloride 0.9%. 1000 milliLiter(s) IV Continuous <Continuous>  vancomycin    Solution 125 milliGRAM(s) Oral every 6 hours    PRN MEDICATIONS  acetaminophen     Tablet .. 650 milliGRAM(s) Oral every 6 hours PRN  morphine  - Injectable 1 milliGRAM(s) IV Push every 4 hours PRN  ondansetron Injectable 4 milliGRAM(s) IV Push every 6 hours PRN    VITALS:   T(C): 36.8 (01-23-23 @ 04:39), Max: 38.1 (01-23-23 @ 01:44)  T(F): 98.2 (01-23-23 @ 04:39), Max: 100.6 (01-23-23 @ 01:44)  HR: 105 (01-23-23 @ 04:39) (105 - 111)  BP: 128/59 (01-23-23 @ 04:39) (106/57 - 128/59)  BP(mean): --  ABP: --  ABP(mean): --  RR: 18 (01-23-23 @ 04:39) (18 - 20)  SpO2: --  LABS:                        12.0   10.52 )-----------( 170      ( 22 Jan 2023 09:53 )             34.9     01-22    139  |  106  |  10  ----------------------------<  102<H>  3.6   |  24  |  0.5<L>    Ca    8.5      22 Jan 2023 09:53    TPro  4.8<L>  /  Alb  2.8<L>  /  TBili  0.5  /  DBili  x   /  AST  15  /  ALT  19  /  AlkPhos  90  01-22        I&O's Detail      Troponin T, Serum: <0.01 ng/mL (01-23-23 @ 07:45)  Sedimentation Rate, Erythrocyte: 11 mm/Hr (01-23-23 @ 07:45)        CARDIAC MARKERS ( 23 Jan 2023 07:45 )  x     / <0.01 ng/mL / x     / x     / x          RADIOLOGY:  EKG  12 Lead ECG:   Ventricular Rate 105 BPM    Atrial Rate 105 BPM    P-R Interval 122 ms    QRS Duration 92 ms    Q-T Interval 342 ms    QTC Calculation(Bazett) 452 ms    P Axis 43 degrees    R Axis -1 degrees    T Axis 17 degrees    Diagnosis Line Sinus tachycardia  Otherwise normal ECG    Confirmed by Ángel Nguyen ([id number]) on 1/23/2023 7:40:57 AM (01-22-23 @ 11:02)      Physical Exam:  General: no acute distress, lying in bed  Head: normocephalic and atraumatic  Neck: supple  Heart: regular rate and rhythm, S1 and S2 normal, no murmurs, rubs or gallops noted on exam  Lungs: Symmetric chest expansion bilaterally, clear lungs bilaterally, no wheezes rhonchi or crackles heard  Abdomen: Bowel sounds present, non tender on light and deep palpation  Extremities: No edema, no clubbing or cyanosis notes
  EFREN HONG  57y  Female  ***My note supersedes ALL resident notes that I sign.  My corrections for their notes are in my note.***    I can be reached directly on [id number]. My office number is [phone]. My personal cell number is [phone].    INTERVAL EVENTS: Here for f/u of diarrhea. Stool is like toothpaste today, not watery. No blood. Pt drinks a little. So far, only eating a little farina. Walks fine. Seems to have personality disorder.    T(F): 98.6 (01-23-23 @ 14:01), Max: 100.6 (01-23-23 @ 01:44)  HR: 101 (01-23-23 @ 14:01) (101 - 105)  BP: 122/67 (01-23-23 @ 14:01) (122/67 - 128/59)  RR: 18 (01-23-23 @ 14:01) (18 - 18)  SpO2: --    Gen: NAD  HEENT: PERRL, EOMI, mouth clr, nose clr  Neck: no nodes, no JVD, thyroid nl  lungs: clr  hrt: s1 s2 rrr no murmur  abd: soft, NT/ND, no HS megaly  ext: no edema, no c/c  neuro: aa ox3, cn intact, can move all 4 ext    LABS:                      12.0    (    88.4   10.52 )-----------( ---------      170      ( 22 Jan 2023 09:53 )             34.9    (    12.8     CARDIAC MARKERS ( 23 Jan 2023 07:45 )  x     / <0.01 ng/mL / x     / x     / x        RADIOLOGY & ADDITIONAL TESTS:  < from: CT Abdomen and Pelvis w/ IV Cont (01.20.23 @ 20:14) >  IMPRESSION:  Acute colitis from mid transverse through sigmoid colon; no evidence of   abscess.    < end of copied text >      MEDICATIONS:  vancomycin    Solution 125 milliGRAM(s) Oral every 6 hours    acetaminophen     Tablet .. 650 milliGRAM(s) Oral every 6 hours PRN  enoxaparin Injectable 40 milliGRAM(s) SubCutaneous every 24 hours  morphine  - Injectable 1 milliGRAM(s) IV Push every 4 hours PRN  ondansetron Injectable 4 milliGRAM(s) IV Push every 6 hours PRN  sodium chloride 0.9%. 1000 milliLiter(s) IV Continuous <Continuous>  
  EFREN HONG  57y  Female  ***My note supersedes ALL resident notes that I sign.  My corrections for their notes are in my note.***    I can be reached directly on [id number]. My office number is [phone]. My personal cell number is [phone].    INTERVAL EVENTS: Here for f/u of diarrhea. Pt having about the same 3-4 stools/day. Some pasty, some watery. Abd pain is improving and feels less gassy/bloated. Pt zuleika diet. Pt crying a lot today because of recent break up w/ boyfriend of 11 yrs. Pt asked to speak w/ psych.    T(F): 98.7 (01-25-23 @ 06:16), Max: 99.4 (01-24-23 @ 19:56)  HR: 99 (01-24-23 @ 19:56) (99 - 107)  BP: 110/62 (01-25-23 @ 06:16) (109/65 - 113/65)  RR: 18 (01-25-23 @ 06:16) (18 - 18)  SpO2: --    Gen: NAD  HEENT: PERRL, EOMI, mouth clr, nose clr  Neck: no nodes, no JVD, thyroid nl  lungs: clr  hrt: s1 s2 rrr no murmur  abd: soft (not rigid, no rebound), no distension, mild diff pain to palp; no HS megaly  ext: no edema, no c/c  neuro: aa ox3, cn intact, can move all 4 ext    LABS:                      11.8    (    89.0   5.23  )-----------( ---------      203      ( 25 Jan 2023 06:33 )             34.7    (    12.9     141   (   106   (   111      01-25-23 @ 06:33  ----------------------               4.2   (   21   (   5                             -----                        0.5  Ca  8.4   Mg  1.8    P   --     LFT  4.9  (  0.3  (  29       01-25-23 @ 06:33  -------------------------  2.9  (  108  (  29    RADIOLOGY & ADDITIONAL TESTS:    MEDICATIONS:  vancomycin    Solution 125 milliGRAM(s) Oral every 6 hours    enoxaparin Injectable 40 milliGRAM(s) SubCutaneous every 24 hours  ondansetron Injectable 4 milliGRAM(s) IV Push every 6 hours PRN  oxycodone    5 mG/acetaminophen 325 mG 1 Tablet(s) Oral every 6 hours PRN  simethicone 80 milliGRAM(s) Chew every 6 hours  
  EFREN HONG  57y  Female  ***My note supersedes ALL resident notes that I sign.  My corrections for their notes are in my note.***    I can be reached directly on [employer]. My office number is [phone]. My personal cell number is [phone].    INTERVAL EVENTS: Here for f/u of diarrhea. Pt much better. Pt feels OK to go home.    T(F): 98.6 (01-26-23 @ 21:26), Max: 98.6 (01-26-23 @ 21:26)  HR: 115 (01-26-23 @ 21:26) (115 - 115) by my exam:  (anxious)  BP: 92/66 (01-26-23 @ 21:26) (92/66 - 92/66) sys now 107  RR: 18 (01-26-23 @ 21:26) (18 - 18)  SpO2: --    Gen: NAD  HEENT: PERRL, EOMI, mouth clr, nose clr  Neck: no nodes, no JVD, thyroid nl  lungs: clr  hrt: s1 s2 reg; mildly tachy; no murmur  abd: soft (not rigid, no rebound), no distension, no pain; no HS megaly  ext: no edema, no c/c  neuro: aa ox3, cn intact, can move all 4 ext    LABS:    RADIOLOGY & ADDITIONAL TESTS:      MEDICATIONS:  vancomycin    Solution 125 milliGRAM(s) Oral every 6 hours    enoxaparin Injectable 40 milliGRAM(s) SubCutaneous every 24 hours  ondansetron Injectable 4 milliGRAM(s) IV Push every 6 hours PRN  oxycodone    5 mG/acetaminophen 325 mG 1 Tablet(s) Oral every 6 hours PRN  simethicone 80 milliGRAM(s) Chew every 6 hours  
EFREN HONG  57y  Female  ***My note supersedes ALL resident notes that I sign.  My corrections for their notes are in my note.***    I can be reached directly on [employer]. My office number is [phone]. My personal cell number is [phone].    INTERVAL EVENTS: Here for f/u of C Diff. Pt had 3-4 loose BMs in past 24 hrs. Pt would like to adv diet. Pt feel bloated today. Had temp last night.    T(F): 97.2 (01-24-23 @ 04:52), Max: 100.9 (01-23-23 @ 18:10)  HR: 91 (01-24-23 @ 04:52) (91 - 101)  BP: 117/56 (01-24-23 @ 04:52) (117/56 - 127/62)  RR: 18 (01-24-23 @ 04:52) (18 - 18)  SpO2: --    Gen: NAD  HEENT: PERRL, EOMI, mouth clr, nose clr  Neck: no nodes, no JVD, thyroid nl  lungs: clr  hrt: s1 s2 rrr no murmur  abd: soft (not rigid, no rebound), mild distension, mild diff pain to palp; no HS megaly  ext: no edema, no c/c  neuro: aa ox3, cn intact, can move all 4 ext    LABS:                      11.7    (    89.9   5.47  )-----------( ---------      182      ( 24 Jan 2023 07:13 )             34.6    (    12.8     140   (   105   (   124      01-24-23 @ 07:13  ----------------------               3.2   (   23   (   3                             -----                        0.5  Ca  8.3   Mg  1.6    P   --     LFT  4.6  (  0.3  (  27       01-24-23 @ 07:13  -------------------------  2.7  (  95  (  25    CARDIAC MARKERS ( 23 Jan 2023 07:45 )  x     / <0.01 ng/mL / x     / x     / x        RADIOLOGY & ADDITIONAL TESTS:    MEDICATIONS:  vancomycin    Solution 125 milliGRAM(s) Oral every 6 hours    acetaminophen     Tablet .. 650 milliGRAM(s) Oral every 6 hours PRN  enoxaparin Injectable 40 milliGRAM(s) SubCutaneous every 24 hours  morphine  - Injectable 1 milliGRAM(s) IV Push every 4 hours PRN  ondansetron Injectable 4 milliGRAM(s) IV Push every 6 hours PRN  potassium chloride   Powder 40 milliEquivalent(s) Oral every 4 hours  simethicone 80 milliGRAM(s) Chew every 6 hours  
SUBJECTIVE:  Patient is a 57y old Female who presents with a chief complaint of diarrhea (23 Jan 2023 17:20)   Colitis     Today is hospital day 4d. There are no new issues or overnight events.     HPI  HPI:  58 yo female kth diverticulosis prestented for diarrhea.   The patient stated that the diarrhea started about 8 days ago. The diarrhea is water, non bloody and is associated with lower abdominal  crampy pain.   The patient stated that she has about 5-6 episodes a day, and she is not able to tolerate po intake, and she had few episodes of vomiting.  She denies any travel, sick contacts or recent abx use. However she endorses chills and subjective fever, but denies any chronic diarrhea   She was advised by her GI ( dr. Mckeon) to visit the ed.   In the ed her ct scan showed colitis     (21 Jan 2023 10:40)      PAST MEDICAL & SURGICAL HISTORY  Osteoarthritis    Diverticulitis    GERD (gastroesophageal reflux disease)    History of umbilical hernia repair    History of bladder surgery    Cyst of neck      ALLERGIES:  aspirin (Short breath)  erythromycin (Other)  penicillins (Unknown)  sulfa  patient states that stitches needed to be re-opened after suture with sulfa material (Other)    MEDICATIONS:  HOME MEDICATIONS  Multi Vitamin+:   Probiotic Formula:   vancomycin 125 mg oral capsule: 1 cap(s) orally every 6 hours     STANDING MEDICATIONS  enoxaparin Injectable 40 milliGRAM(s) SubCutaneous every 24 hours  magnesium sulfate  IVPB 2 Gram(s) IV Intermittent once  potassium chloride   Powder 40 milliEquivalent(s) Oral every 4 hours  vancomycin    Solution 125 milliGRAM(s) Oral every 6 hours    PRN MEDICATIONS  acetaminophen     Tablet .. 650 milliGRAM(s) Oral every 6 hours PRN  morphine  - Injectable 1 milliGRAM(s) IV Push every 4 hours PRN  ondansetron Injectable 4 milliGRAM(s) IV Push every 6 hours PRN    VITALS:   T(C): 36.2 (01-24-23 @ 04:52), Max: 38.3 (01-23-23 @ 18:10)  T(F): 97.2 (01-24-23 @ 04:52), Max: 100.9 (01-23-23 @ 18:10)  HR: 91 (01-24-23 @ 04:52) (91 - 101)  BP: 117/56 (01-24-23 @ 04:52) (117/56 - 127/62)  BP(mean): --  ABP: --  ABP(mean): --  RR: 18 (01-24-23 @ 04:52) (18 - 18)  SpO2: --  LABS:                        11.7   5.47  )-----------( 182      ( 24 Jan 2023 07:13 )             34.6     01-24    140  |  105  |  3<L>  ----------------------------<  124<H>  3.2<L>   |  23  |  0.5<L>    Ca    8.3<L>      24 Jan 2023 07:13  Mg     1.6     01-24    TPro  4.6<L>  /  Alb  2.7<L>  /  TBili  0.3  /  DBili  x   /  AST  27  /  ALT  25  /  AlkPhos  95  01-24        I&O's Detail            CARDIAC MARKERS ( 23 Jan 2023 07:45 )  x     / <0.01 ng/mL / x     / x     / x          RADIOLOGY:  EKG  12 Lead ECG:   Ventricular Rate 105 BPM    Atrial Rate 105 BPM    P-R Interval 122 ms    QRS Duration 92 ms    Q-T Interval 342 ms    QTC Calculation(Bazett) 452 ms    P Axis 43 degrees    R Axis -1 degrees    T Axis 17 degrees    Diagnosis Line Sinus tachycardia  Otherwise normal ECG    Confirmed by Ángel Nguyen ([id number]) on 1/23/2023 7:40:57 AM (01-22-23 @ 11:02)      Physical Exam:  General: no acute distress, lying in bed  Head: normocephalic and atraumatic  Neck: supple  Heart: regular rate and rhythm, S1 and S2 normal, no murmurs, rubs or gallops noted on exam  Lungs: Symmetric chest expansion bilaterally, clear lungs bilaterally, no wheezes rhonchi or crackles heard  Abdomen: Bowel sounds present, non tender on light and deep palpation  Extremities: No edema, no clubbing or cyanosis notes
SUBJECTIVE:  Patient is a 57y old Female who presents with a chief complaint of diarrhea (24 Jan 2023 15:01)   Colitis     Today is hospital day 5d. There are no new issues or overnight events.     HPI  HPI:  58 yo female kth diverticulosis prestented for diarrhea.   The patient stated that the diarrhea started about 8 days ago. The diarrhea is water, non bloody and is associated with lower abdominal  crampy pain.   The patient stated that she has about 5-6 episodes a day, and she is not able to tolerate po intake, and she had few episodes of vomiting.  She denies any travel, sick contacts or recent abx use. However she endorses chills and subjective fever, but denies any chronic diarrhea   She was advised by her GI ( dr. Mckeon) to visit the ed.   In the ed her ct scan showed colitis     (21 Jan 2023 10:40)      PAST MEDICAL & SURGICAL HISTORY  Osteoarthritis    Diverticulitis    GERD (gastroesophageal reflux disease)    History of umbilical hernia repair    History of bladder surgery    Cyst of neck      ALLERGIES:  aspirin (Short breath)  erythromycin (Other)  penicillins (Unknown)  sulfa  patient states that stitches needed to be re-opened after suture with sulfa material (Other)    MEDICATIONS:  HOME MEDICATIONS  Multi Vitamin+:   Probiotic Formula:   vancomycin 125 mg oral capsule: 1 cap(s) orally every 6 hours     STANDING MEDICATIONS  enoxaparin Injectable 40 milliGRAM(s) SubCutaneous every 24 hours  simethicone 80 milliGRAM(s) Chew every 6 hours  vancomycin    Solution 125 milliGRAM(s) Oral every 6 hours    PRN MEDICATIONS  acetaminophen     Tablet .. 650 milliGRAM(s) Oral every 6 hours PRN  morphine  - Injectable 1 milliGRAM(s) IV Push every 4 hours PRN  ondansetron Injectable 4 milliGRAM(s) IV Push every 6 hours PRN    VITALS:   T(C): 37.1 (01-25-23 @ 06:16), Max: 37.4 (01-24-23 @ 19:56)  T(F): 98.7 (01-25-23 @ 06:16), Max: 99.4 (01-24-23 @ 19:56)  HR: 99 (01-24-23 @ 19:56) (99 - 107)  BP: 110/62 (01-25-23 @ 06:16) (109/65 - 113/65)  BP(mean): --  ABP: --  ABP(mean): --  RR: 18 (01-25-23 @ 06:16) (18 - 18)  SpO2: --  LABS:                        11.8   5.23  )-----------( 203 ( 25 Jan 2023 06:33 )             34.7     01-25    141  |  106  |  5<L>  ----------------------------<  111<H>  4.2   |  21  |  0.5<L>    Ca    8.4      25 Jan 2023 06:33  Mg     1.8     01-25    TPro  4.9<L>  /  Alb  2.9<L>  /  TBili  0.3  /  DBili  x   /  AST  29  /  ALT  29  /  AlkPhos  108  01-25        I&O's Detail                RADIOLOGY:  EKG  12 Lead ECG:   Ventricular Rate 105 BPM    Atrial Rate 105 BPM    P-R Interval 122 ms    QRS Duration 92 ms    Q-T Interval 342 ms    QTC Calculation(Bazett) 452 ms    P Axis 43 degrees    R Axis -1 degrees    T Axis 17 degrees    Diagnosis Line Sinus tachycardia  Otherwise normal ECG    Confirmed by Ángel Nguyen ([id number]) on 1/23/2023 7:40:57 AM (01-22-23 @ 11:02)      Physical Exam:  General: no acute distress, lying in bed  Head: normocephalic and atraumatic  Neck: supple  Heart: regular rate and rhythm, S1 and S2 normal, no murmurs, rubs or gallops noted on exam  Lungs: Symmetric chest expansion bilaterally, clear lungs bilaterally, no wheezes rhonchi or crackles heard  Abdomen: Bowel sounds present, non tender on light and deep palpation  Extremities: No edema, no clubbing or cyanosis notes
pt seen and examined.  doing well.  reports decreasing frequency of diarrhea.  tolerating PO diet and advanced to solids today.  reports low grade fever yesterday but none today and no chills.       PAST MEDICAL & SURGICAL HISTORY:  Osteoarthritis  Diverticulitis  GERD (gastroesophageal reflux disease)  History of umbilical hernia repair  History of bladder surgery  Cyst of neck    MEDICATIONS  (STANDING):  enoxaparin Injectable 40 milliGRAM(s) SubCutaneous every 24 hours  potassium chloride   Powder 40 milliEquivalent(s) Oral every 4 hours  simethicone 80 milliGRAM(s) Chew every 6 hours  vancomycin    Solution 125 milliGRAM(s) Oral every 6 hours    MEDICATIONS  (PRN):  acetaminophen     Tablet .. 650 milliGRAM(s) Oral every 6 hours PRN Temp greater or equal to 38C (100.4F), Moderate Pain (4 - 6)  morphine  - Injectable 1 milliGRAM(s) IV Push every 4 hours PRN Pain (4-10)  ondansetron Injectable 4 milliGRAM(s) IV Push every 6 hours PRN Nausea and/or Vomiting      Allergies  aspirin (Short breath)  erythromycin (Other)  penicillins (Unknown)  sulfa  patient states that stitches needed to be re-opened after suture with sulfa material (Other)      Review of Systems:   Cardiovascular:  No Chest Pain, No Palpitations  Respiratory:  No Cough, No Dyspnea  Gastrointestinal:  As described in HPI    Physical Examination:  T(C): 36.2 (01-24-23 @ 04:52), Max: 38.3 (01-23-23 @ 18:10)  HR: 91 (01-24-23 @ 04:52) (91 - 97)  BP: 117/56 (01-24-23 @ 04:52) (117/56 - 127/62)  RR: 18 (01-24-23 @ 04:52) (18 - 18)  SpO2: --      Constitutional: No acute distress.  Respiratory:  No signs of respiratory distress. Lung sounds are clear bilaterally.  Cardiovascular:  S1 S2, Regular rate and rhythm.  Abdominal: Abdomen is soft, symmetric, and mildly distended with mild diffuse tenderness.+ BS.  Skin: No rashes, No Jaundice.  Neuro: AAOx3        Data:                        11.7   5.47  )-----------( 182      ( 24 Jan 2023 07:13 )             34.6     Hgb trend:  11.7  01-24-23 @ 07:13  12.0  01-22-23 @ 09:53        01-24    140  |  105  |  3<L>  ----------------------------<  124<H>  3.2<L>   |  23  |  0.5<L>    Ca    8.3<L>      24 Jan 2023 07:13  Mg     1.6     01-24    TPro  4.6<L>  /  Alb  2.7<L>  /  TBili  0.3  /  DBili  x   /  AST  27  /  ALT  25  /  AlkPhos  95  01-24    Liver panel trend:  TBili 0.3   /   AST 27   /   ALT 25   /   AlkP 95   /   Tptn 4.6   /   Alb 2.7    /   DBili --      01-24  TBili 0.5   /   AST 15   /   ALT 19   /   AlkP 90   /   Tptn 4.8   /   Alb 2.8    /   DBili --      01-22  TBili 0.9   /   AST 15   /   ALT 14   /   AlkP 84   /   Tptn 5.6   /   Alb 3.5    /   DBili --      01-20             Radiology:  < from: CT Abdomen and Pelvis w/ IV Cont (01.20.23 @ 20:14) >  INTERPRETATION:  CLINICAL STATEMENT: Abdominal pain.    TECHNIQUE: Contiguous axial CT images were obtained from the lower chest   to the pubic symphysis ministration.  Oral contrast was not administered.    Reformatted images in the coronal and sagittal planes were acquired.    Comparison made with CT abdomen and pelvis 7/14/2014.    FINDINGS:    LOWER CHEST: Unremarkable.    HEPATOBILIARY: Unremarkable.    SPLEEN: Unremarkable.    PANCREAS: Unremarkable.    ADRENAL GLANDS: Unremarkable.    KIDNEYS: Unremarkable.    ABDOMINOPELVIC NODES: Unremarkable.    PELVIC ORGANS: Unremarkable.    PERITONEUM/MESENTERY/BOWEL: Diffuse abnormal colonic circumferential wall   thickening, mucosal hyperenhancement from mid transverse through sigmoid   colon, with mild pericolonic inflammatory stranding. Colonic   diverticulosis. Trace free pelvic fluid. Normal appendix. No abscess or   grossfree air.    BONES/SOFT TISSUES: Unremarkable.        IMPRESSION:  Acute colitis from mid transverse through sigmoid colon; no evidence of   abscess.    --- End of Report ---            MAHESH GEORGE MD; Attending Radiologist  This document has been electronically signed. Jan 20 2023  8:25PM    < end of copied text >

## 2023-01-27 NOTE — PROGRESS NOTE ADULT - REASON FOR ADMISSION
diarrhea

## 2023-02-05 DIAGNOSIS — A04.72 ENTEROCOLITIS DUE TO CLOSTRIDIUM DIFFICILE, NOT SPECIFIED AS RECURRENT: ICD-10-CM

## 2023-02-05 DIAGNOSIS — E83.42 HYPOMAGNESEMIA: ICD-10-CM

## 2023-02-05 DIAGNOSIS — K21.9 GASTRO-ESOPHAGEAL REFLUX DISEASE WITHOUT ESOPHAGITIS: ICD-10-CM

## 2023-02-05 DIAGNOSIS — R07.89 OTHER CHEST PAIN: ICD-10-CM

## 2023-02-05 DIAGNOSIS — Z88.1 ALLERGY STATUS TO OTHER ANTIBIOTIC AGENTS STATUS: ICD-10-CM

## 2023-02-05 DIAGNOSIS — M19.91 PRIMARY OSTEOARTHRITIS, UNSPECIFIED SITE: ICD-10-CM

## 2023-02-05 DIAGNOSIS — F60.9 PERSONALITY DISORDER, UNSPECIFIED: ICD-10-CM

## 2023-02-05 DIAGNOSIS — F43.20 ADJUSTMENT DISORDER, UNSPECIFIED: ICD-10-CM

## 2023-02-05 DIAGNOSIS — R07.9 CHEST PAIN, UNSPECIFIED: ICD-10-CM

## 2023-02-05 DIAGNOSIS — E87.6 HYPOKALEMIA: ICD-10-CM

## 2023-02-13 ENCOUNTER — INPATIENT (INPATIENT)
Facility: HOSPITAL | Age: 58
LOS: 3 days | Discharge: HOME | DRG: 872 | End: 2023-02-17
Attending: INTERNAL MEDICINE | Admitting: INTERNAL MEDICINE
Payer: COMMERCIAL

## 2023-02-13 VITALS
HEIGHT: 63 IN | DIASTOLIC BLOOD PRESSURE: 62 MMHG | HEART RATE: 135 BPM | TEMPERATURE: 99 F | SYSTOLIC BLOOD PRESSURE: 102 MMHG | WEIGHT: 139.99 LBS | OXYGEN SATURATION: 95 % | RESPIRATION RATE: 18 BRPM

## 2023-02-13 DIAGNOSIS — A04.72 ENTEROCOLITIS DUE TO CLOSTRIDIUM DIFFICILE, NOT SPECIFIED AS RECURRENT: ICD-10-CM

## 2023-02-13 DIAGNOSIS — Z98.890 OTHER SPECIFIED POSTPROCEDURAL STATES: Chronic | ICD-10-CM

## 2023-02-13 DIAGNOSIS — L72.3 SEBACEOUS CYST: Chronic | ICD-10-CM

## 2023-02-13 LAB
ALBUMIN SERPL ELPH-MCNC: 4.1 G/DL — SIGNIFICANT CHANGE UP (ref 3.5–5.2)
ALP SERPL-CCNC: 89 U/L — SIGNIFICANT CHANGE UP (ref 30–115)
ALT FLD-CCNC: 16 U/L — SIGNIFICANT CHANGE UP (ref 0–41)
ANION GAP SERPL CALC-SCNC: 16 MMOL/L — HIGH (ref 7–14)
AST SERPL-CCNC: 19 U/L — SIGNIFICANT CHANGE UP (ref 0–41)
BASOPHILS # BLD AUTO: 0.02 K/UL — SIGNIFICANT CHANGE UP (ref 0–0.2)
BASOPHILS NFR BLD AUTO: 0.1 % — SIGNIFICANT CHANGE UP (ref 0–1)
BILIRUB SERPL-MCNC: 1.6 MG/DL — HIGH (ref 0.2–1.2)
BUN SERPL-MCNC: 15 MG/DL — SIGNIFICANT CHANGE UP (ref 10–20)
C DIFF BY PCR RESULT: DETECTED
CALCIUM SERPL-MCNC: 9.6 MG/DL — SIGNIFICANT CHANGE UP (ref 8.4–10.5)
CHLORIDE SERPL-SCNC: 102 MMOL/L — SIGNIFICANT CHANGE UP (ref 98–110)
CO2 SERPL-SCNC: 22 MMOL/L — SIGNIFICANT CHANGE UP (ref 17–32)
CREAT SERPL-MCNC: 0.8 MG/DL — SIGNIFICANT CHANGE UP (ref 0.7–1.5)
EGFR: 85 ML/MIN/1.73M2 — SIGNIFICANT CHANGE UP
EOSINOPHIL # BLD AUTO: 0.01 K/UL — SIGNIFICANT CHANGE UP (ref 0–0.7)
EOSINOPHIL NFR BLD AUTO: 0.1 % — SIGNIFICANT CHANGE UP (ref 0–8)
FLUAV AG NPH QL: SIGNIFICANT CHANGE UP
FLUBV AG NPH QL: SIGNIFICANT CHANGE UP
GLUCOSE SERPL-MCNC: 118 MG/DL — HIGH (ref 70–99)
HCT VFR BLD CALC: 42.7 % — SIGNIFICANT CHANGE UP (ref 37–47)
HGB BLD-MCNC: 14.3 G/DL — SIGNIFICANT CHANGE UP (ref 12–16)
IMM GRANULOCYTES NFR BLD AUTO: 0.7 % — HIGH (ref 0.1–0.3)
LACTATE SERPL-SCNC: 1.4 MMOL/L — SIGNIFICANT CHANGE UP (ref 0.7–2)
LIDOCAIN IGE QN: 11 U/L — SIGNIFICANT CHANGE UP (ref 7–60)
LYMPHOCYTES # BLD AUTO: 0.49 K/UL — LOW (ref 1.2–3.4)
LYMPHOCYTES # BLD AUTO: 2.8 % — LOW (ref 20.5–51.1)
MAGNESIUM SERPL-MCNC: 1.5 MG/DL — LOW (ref 1.8–2.4)
MCHC RBC-ENTMCNC: 30.9 PG — SIGNIFICANT CHANGE UP (ref 27–31)
MCHC RBC-ENTMCNC: 33.5 G/DL — SIGNIFICANT CHANGE UP (ref 32–37)
MCV RBC AUTO: 92.2 FL — SIGNIFICANT CHANGE UP (ref 81–99)
MONOCYTES # BLD AUTO: 1.31 K/UL — HIGH (ref 0.1–0.6)
MONOCYTES NFR BLD AUTO: 7.4 % — SIGNIFICANT CHANGE UP (ref 1.7–9.3)
NEUTROPHILS # BLD AUTO: 15.75 K/UL — HIGH (ref 1.4–6.5)
NEUTROPHILS NFR BLD AUTO: 88.9 % — HIGH (ref 42.2–75.2)
NRBC # BLD: 0 /100 WBCS — SIGNIFICANT CHANGE UP (ref 0–0)
PLATELET # BLD AUTO: 232 K/UL — SIGNIFICANT CHANGE UP (ref 130–400)
POTASSIUM SERPL-MCNC: 3.9 MMOL/L — SIGNIFICANT CHANGE UP (ref 3.5–5)
POTASSIUM SERPL-SCNC: 3.9 MMOL/L — SIGNIFICANT CHANGE UP (ref 3.5–5)
PROT SERPL-MCNC: 6.6 G/DL — SIGNIFICANT CHANGE UP (ref 6–8)
RBC # BLD: 4.63 M/UL — SIGNIFICANT CHANGE UP (ref 4.2–5.4)
RBC # FLD: 14 % — SIGNIFICANT CHANGE UP (ref 11.5–14.5)
RSV RNA NPH QL NAA+NON-PROBE: SIGNIFICANT CHANGE UP
SARS-COV-2 RNA SPEC QL NAA+PROBE: SIGNIFICANT CHANGE UP
SODIUM SERPL-SCNC: 140 MMOL/L — SIGNIFICANT CHANGE UP (ref 135–146)
WBC # BLD: 17.7 K/UL — HIGH (ref 4.8–10.8)
WBC # FLD AUTO: 17.7 K/UL — HIGH (ref 4.8–10.8)

## 2023-02-13 PROCEDURE — 80053 COMPREHEN METABOLIC PANEL: CPT

## 2023-02-13 PROCEDURE — 36415 COLL VENOUS BLD VENIPUNCTURE: CPT

## 2023-02-13 PROCEDURE — 99222 1ST HOSP IP/OBS MODERATE 55: CPT

## 2023-02-13 PROCEDURE — 74177 CT ABD & PELVIS W/CONTRAST: CPT | Mod: 26,MA

## 2023-02-13 PROCEDURE — 85025 COMPLETE CBC W/AUTO DIFF WBC: CPT

## 2023-02-13 PROCEDURE — 93010 ELECTROCARDIOGRAM REPORT: CPT

## 2023-02-13 PROCEDURE — 83735 ASSAY OF MAGNESIUM: CPT

## 2023-02-13 RX ORDER — MAGNESIUM SULFATE 500 MG/ML
2 VIAL (ML) INJECTION ONCE
Refills: 0 | Status: COMPLETED | OUTPATIENT
Start: 2023-02-13 | End: 2023-02-13

## 2023-02-13 RX ORDER — ACETAMINOPHEN 500 MG
650 TABLET ORAL EVERY 6 HOURS
Refills: 0 | Status: DISCONTINUED | OUTPATIENT
Start: 2023-02-13 | End: 2023-02-17

## 2023-02-13 RX ORDER — VANCOMYCIN HCL 1 G
125 VIAL (EA) INTRAVENOUS EVERY 6 HOURS
Refills: 0 | Status: DISCONTINUED | OUTPATIENT
Start: 2023-02-13 | End: 2023-02-13

## 2023-02-13 RX ORDER — ENOXAPARIN SODIUM 100 MG/ML
40 INJECTION SUBCUTANEOUS EVERY 24 HOURS
Refills: 0 | Status: DISCONTINUED | OUTPATIENT
Start: 2023-02-14 | End: 2023-02-17

## 2023-02-13 RX ORDER — FIDAXOMICIN 200 MG/5ML
200 GRANULE, FOR SUSPENSION ORAL
Refills: 0 | Status: DISCONTINUED | OUTPATIENT
Start: 2023-02-14 | End: 2023-02-17

## 2023-02-13 RX ORDER — FIDAXOMICIN 200 MG/5ML
200 GRANULE, FOR SUSPENSION ORAL ONCE
Refills: 0 | Status: COMPLETED | OUTPATIENT
Start: 2023-02-13 | End: 2023-02-13

## 2023-02-13 RX ORDER — KETOROLAC TROMETHAMINE 30 MG/ML
15 SYRINGE (ML) INJECTION ONCE
Refills: 0 | Status: DISCONTINUED | OUTPATIENT
Start: 2023-02-13 | End: 2023-02-13

## 2023-02-13 RX ORDER — FIDAXOMICIN 200 MG/5ML
200 GRANULE, FOR SUSPENSION ORAL
Refills: 0 | Status: DISCONTINUED | OUTPATIENT
Start: 2023-02-13 | End: 2023-02-13

## 2023-02-13 RX ORDER — SODIUM CHLORIDE 9 MG/ML
2000 INJECTION, SOLUTION INTRAVENOUS ONCE
Refills: 0 | Status: COMPLETED | OUTPATIENT
Start: 2023-02-13 | End: 2023-02-13

## 2023-02-13 RX ORDER — SODIUM CHLORIDE 9 MG/ML
1000 INJECTION, SOLUTION INTRAVENOUS
Refills: 0 | Status: DISCONTINUED | OUTPATIENT
Start: 2023-02-13 | End: 2023-02-17

## 2023-02-13 RX ADMIN — Medication 650 MILLIGRAM(S): at 16:57

## 2023-02-13 RX ADMIN — Medication 15 MILLIGRAM(S): at 20:15

## 2023-02-13 RX ADMIN — Medication 25 GRAM(S): at 16:57

## 2023-02-13 RX ADMIN — FIDAXOMICIN 200 MILLIGRAM(S): 200 GRANULE, FOR SUSPENSION ORAL at 23:02

## 2023-02-13 RX ADMIN — SODIUM CHLORIDE 2000 MILLILITER(S): 9 INJECTION, SOLUTION INTRAVENOUS at 12:34

## 2023-02-13 NOTE — H&P ADULT - NSHPPHYSICALEXAM_GEN_ALL_CORE
VITALS:   T(C): 37.1 (02-13-23 @ 15:48), Max: 37.3 (02-13-23 @ 11:16)  HR: 96 (02-13-23 @ 15:48) (96 - 125)  BP: 103/58 (02-13-23 @ 15:48) (102/62 - 103/58)  RR: 18 (02-13-23 @ 11:16) (18 - 18)  SpO2: 98% (02-13-23 @ 15:48) (95% - 98%)    GENERAL: NAD, lying in bed comfortably  HEAD:  Atraumatic, normocephalic  EYES: EOMI, PERRLA, conjunctiva and sclera clear  ENT: Moist mucous membranes  NECK: Supple, no JVD  HEART: Regular rate and rhythm, no murmurs, rubs, or gallops  LUNGS: Unlabored respirations.  Clear to auscultation bilaterally, no crackles, wheezing, or rhonchi  ABDOMEN: Soft, nontender, nondistended, +BS  EXTREMITIES: 2+ peripheral pulses bilaterally. No clubbing, cyanosis, or edema  NERVOUS SYSTEM:  A&Ox3, no focal deficits   SKIN: No rashes or lesions

## 2023-02-13 NOTE — CONSULT NOTE ADULT - ASSESSMENT
ASSESSMENT   Patient is a 57 year old female PMH of diverticulosis presented for diarrhea.    IMPRESSION  #Recurrent C diff colitis  - C Diff by PCR Result: Detected: (01.22.23 @ 11:10)  - C Diff by PCR Result: Detected: (02.13.23 @ 12:30)    #Leukocytosis     #Obesity BMI (kg/m2): 24.8, 24.8  #DM   #Abx allergy: aspirin (Short breath)  erythromycin (Other)  sulfa  patient states that stitches needed to be re-opened after suture with sulfa material (Other)    RECOMMENDATIONS  - as first recurrence, can start fidaxamicin 200 mg BID -- plan for 10 days   - trend WBC   - monitor stool count     Please call or message on [employer] Teams if with any questions.  Spectra [phone]

## 2023-02-13 NOTE — H&P ADULT - HISTORY OF PRESENT ILLNESS
57 year old female PMH of diverticulosis and recent admission for c. diff colitis presents for watery diarrhea. Patient was recently discharged 3 weeks ago for non-fulminant c. diff colitis. She was sent home on oral vancomycin and completed her 10 day course. Her symptoms had completely resolved. Yesterday patient started to experience copious amounts of watery diarrhea, non-bloody. It continued into today so she decided to come to the hospital. She endorses mild abdominal discomfort in the lower quadrants. Otherwise denies denies any chest pain, fevers, nausea, vomiting, shortness of breath, urinary symptoms.     In the ED vitals stable.   Labs reveal WBC 17k, mag 1.5, bili 1.6.C. diff positive.   CTAP: Mildly improved colitis from the left colon and rectum  Patient was given 2L LR bolus and started on oral vancomycin.

## 2023-02-13 NOTE — ED ADULT TRIAGE NOTE - CHIEF COMPLAINT QUOTE
Patient complaining of diarrhea for the past 30 hours. Patient had cdiff at the end of january and was told to come back if she has uncontrollable diarrhea.

## 2023-02-13 NOTE — H&P ADULT - ATTENDING COMMENTS
58 yo F with PMHx as above recently admitted in January for C. Diff colitis returns with recurrence of watery diarrhea for 3 days duration associated with nausea and decreased PO intake.    IMPRESSION:     First Recurrence, C. Diff Colitis   Leukocytosis  Magnesium Deficiency  HO Unspecified Underlying Personality Disorder (as per prior documentation)     PLAN:    CT Abdomen/Pelvis reviewed demonstrating mildly improved colitis from the left colon and rectum  Continue LR@100cc/hr until improved PO toleration  Advance diet as tolerated  ID recommendations noted   Anti-emetics  Trend CBC  Magnesium repleted     Monique Starks MD  Hospitalist Attending 56 yo F with PMHx as above recently admitted in January for C. Diff colitis returns with recurrence of watery diarrhea for 3 days duration associated with nausea and decreased PO intake.    IMPRESSION:     First Recurrence, C. Diff Colitis   Leukocytosis  Magnesium Deficiency  DM  HO Unspecified Underlying Personality Disorder (as per prior documentation)     PLAN:    CT Abdomen/Pelvis reviewed demonstrating mildly improved colitis from the left colon and rectum  Continue LR@100cc/hr until improved PO toleration  Advance diet as tolerated  ID recommendations noted   Anti-emetics  Trend CBC  Magnesium repleted   Monitor fingersticks, insulin if needed        Monique Starks MD  Hospitalist Attending 58 yo F with PMHx as above recently admitted in January for C. Diff colitis returns with recurrence of watery diarrhea for 3 days duration associated with nausea and decreased PO intake.    IMPRESSION:     First Recurrence, C. Diff Colitis   Leukocytosis  Magnesium Deficiency  DM  HO Unspecified Underlying Personality Disorder (as per prior documentation)     PLAN:    CT Abdomen/Pelvis reviewed demonstrating mildly improved colitis from the left colon and rectum  Continue LR@100cc/hr until improved PO toleration  Advance diet as tolerated  ID recommendations noted   Anti-emetics  Trend CBC  Magnesium repleted   Monitor fingersticks, insulin if needed    Disposition: Acute    Patient declined need to contact family, states her 23-yo daughter is aware of her admission.      Monique Starks MD  Hospitalist Attending

## 2023-02-13 NOTE — ED PROVIDER NOTE - PHYSICAL EXAMINATION
VITAL SIGNS: I have reviewed nursing notes and confirm.  CONSTITUTIONAL: Well-appearing, non-toxic, in NAD  SKIN: Warm dry, normal skin turgor  HEAD: NCAT  EYES: No conjunctival injection, scleral anicteric  ENT: Moist mucous membranes, normal pharynx with no erythema or exudates  NECK: Supple; full ROM. Nontender. No cervical LAD  CARD: RRR, no murmurs, rubs or gallops  RESP: Clear to ausculation bilaterally.  No rales, rhonchi, or wheezing.  ABD: Soft, non-distended, lower abdominal tenderness, no rebound or guarding. No CVA tenderness  EXT: Full ROM, no bony tenderness, no pedal edema, no calf tenderness  NEURO: Normal motor, normal sensory. . Normal gait.  PSYCH: Cooperative, appropriate.

## 2023-02-13 NOTE — H&P ADULT - NSHPLABSRESULTS_GEN_ALL_CORE
LABS/RADIOLOGY RESULTS:                          14.3   17.70 )-----------( 232      ( 13 Feb 2023 12:30 )             42.7   02-13    140  |  102  |  15  ----------------------------<  118<H>  3.9   |  22  |  0.8    Ca    9.6      13 Feb 2023 12:30  Mg     1.5     02-13    TPro  6.6  /  Alb  4.1  /  TBili  1.6<H>  /  DBili  x   /  AST  19  /  ALT  16  /  AlkPhos  89  02-13  Blood Cultures

## 2023-02-13 NOTE — ED PROVIDER NOTE - OBJECTIVE STATEMENT
Patient is a 57 year old female PMH of diverticulosis presented for diarrhea. Patient endorses an acute onset of watery stools  ~30 episodes x1 day associated with lower abdominal discomfort. Patient states she had a recent c.diff illness that was being managed with PO Vancomycin. Patient states she finished her antibiotics last week and has been asymptomatic until yesterday. Patient denies any chest pain, fevers, nausea, vomiting, shortness of breath, or additional complaints.

## 2023-02-13 NOTE — ED PROVIDER NOTE - CLINICAL SUMMARY MEDICAL DECISION MAKING FREE TEXT BOX
57-year-old female past medical history of diverticulosis, recent C. difficile colitis status post p.o. vancomycin course who presents to the emergency department with acute onset of lower abdominal pain and numerous watery stools per day.  Patient completed p.o. vancomycin course last week and was asymptomatic until yesterday, when patient began having diarrhea and pain consistent with prior C. difficile colitis episode.  Patient denies any urinary symptoms, fever, blood in stool.    On exam, vital signs reviewed.  Patient appears mildly dehydrated.  Patient with lower abdominal tenderness no guarding or rebound.  No CVA tenderness.  Gross neurological exam is unremarkable.  IV placed, labs sent, C. difficile testing sent, patient had CT scan of abdomen pelvis.  CT scan shows colitis that was less severe than prior episode.  Clinical picture consistent with recurrent C. difficile colitis.  Patient to be admitted for IV hydration and will start appropriate antibiotics.    ED work up reviewed and results and plan of care discussed with patient. Patient requires admission for further work up, monitoring, and management. Need for admission discussed with patient.

## 2023-02-13 NOTE — CONSULT NOTE ADULT - SUBJECTIVE AND OBJECTIVE BOX
EFREN HONG  57y, Female  Allergy: aspirin (Short breath)  erythromycin (Other)  penicillins (Unknown)  sulfa  patient states that stitches needed to be re-opened after suture with sulfa material (Other)      CHIEF COMPLAINT:     LOS      HPI:   Patient is a 57 year old female PMH of diverticulosis presented for diarrhea. Patient endorses an acute onset of watery stools  ~30 episodes x1 day associated with lower abdominal discomfort. Patient states she had a recent c.diff illness that was being managed with PO Vancomycin. Patient states she finished her antibiotics last week and has been asymptomatic until yesterday. Patient denies any chest pain, fevers, nausea, vomiting, shortness of breath, or additional complaints.    INFECTIOUS DISEASE HISTORY:  ED History as above.  Recent admitted on 1/22 for diarrhea, found to have C diff colitis with colitis on transverse to sigmoid colon.   Unclear risk factor -- no recent antiboitics or hospitalizations.   She complted course 1 week prior to admission with improvement in symptoms.   Yesterday, diarrhea returned.   CT Abd/pelvis 2/13 with mildly improved colitis       PAST MEDICAL & SURGICAL HISTORY:  Osteoarthritis      Diverticulitis      GERD (gastroesophageal reflux disease)      History of umbilical hernia repair      History of bladder surgery      Cyst of neck          FAMILY HISTORY  Family history reviewed and non-contributory      SOCIAL HISTORY  Social History:  non smoker   no drug use (21 Jan 2023 10:40)        ROS  General: Denies rigors, nightsweats  HEENT: Denies headache, rhinorrhea, sore throat, eye pain  CV: Denies CP, palpitations  PULM: Denies wheezing, hemoptysis  GI: Denies hematemesis, hematochezia, melena  : Denies discharge, hematuria  MSK: Denies arthralgias, myalgias  SKIN: Denies rash, lesions  NEURO: Denies paresthesias, weakness  PSYCH: Denies depression, anxiety    VITALS:  T(F): 98.8, Max: 99.1 (02-13-23 @ 11:16)  HR: 96  BP: 103/58  RR: 18Vital Signs Last 24 Hrs  T(C): 37.1 (13 Feb 2023 15:48), Max: 37.3 (13 Feb 2023 11:16)  T(F): 98.8 (13 Feb 2023 15:48), Max: 99.1 (13 Feb 2023 11:16)  HR: 96 (13 Feb 2023 15:48) (96 - 125)  BP: 103/58 (13 Feb 2023 15:48) (102/62 - 103/58)  BP(mean): --  RR: 18 (13 Feb 2023 11:16) (18 - 18)  SpO2: 98% (13 Feb 2023 15:48) (95% - 98%)        PHYSICAL EXAM:  Gen: NAD, resting in bed  HEENT: Normocephalic, atraumatic  Neck: supple, no lymphadenopathy  CV: Regular rate & regular rhythm  Lungs: decreased BS at bases, no fremitus  Abdomen: Soft, BS present  Ext: Warm, well perfused  Neuro: non focal, awake  Skin: no rash, no erythema  Lines: no phlebitis    TESTS & MEASUREMENTS:                        14.3   17.70 )-----------( 232      ( 13 Feb 2023 12:30 )             42.7     02-13    140  |  102  |  15  ----------------------------<  118<H>  3.9   |  22  |  0.8    Ca    9.6      13 Feb 2023 12:30  Mg     1.5     02-13    TPro  6.6  /  Alb  4.1  /  TBili  1.6<H>  /  DBili  x   /  AST  19  /  ALT  16  /  AlkPhos  89  02-13      LIVER FUNCTIONS - ( 13 Feb 2023 12:30 )  Alb: 4.1 g/dL / Pro: 6.6 g/dL / ALK PHOS: 89 U/L / ALT: 16 U/L / AST: 19 U/L / GGT: x                 Lactate, Blood: 1.4 mmol/L (02-13-23 @ 12:30)      INFECTIOUS DISEASES TESTING  COVID-19 PCR: NotDetec (01-20-23 @ 22:08)      RADIOLOGY & ADDITIONAL TESTS:  I have personally reviewed the last Chest xray  CXR      CT  CT Abdomen and Pelvis w/ IV Cont:   ACC: [id number] EXAM:  CT ABDOMEN AND PELVIS IC   ORDERED BY: MAHESH ACHARYA     PROCEDURE DATE:  02/13/2023          INTERPRETATION:  CLINICAL STATEMENT: C. difficile    TECHNIQUE: Contiguous axial CT images were obtained from the lower chest   to the pubic symphysis following administration of 100cc Optiray 320   intravenous contrast.  Oral contrast was not administered.  Reformatted   images in the coronal and sagittal planes were acquired.    COMPARISON CT: 1/20/2023    OTHER STUDIES USED FOR CORRELATION: None.      FINDINGS:    LOWER CHEST: Unremarkable.    HEPATOBILIARY: Unremarkable.    SPLEEN: Unremarkable.    PANCREAS: Unremarkable.    ADRENAL GLANDS: Unremarkable.    KIDNEYS: Unremarkable.    ABDOMINOPELVIC NODES: Unremarkable.    PELVIC ORGANS: Unremarkable.    PERITONEUM/MESENTERY/BOWEL: Mild improvement in colitis from the left   colon to the rectum.    BONES/SOFT TISSUES: Unchanged.    OTHER:      IMPRESSION: Mildly improved colitis from the left colon and rectum    --- End of Report ---            JAMIE FOX MD; Attending Radiologist  This document has been electronically signed. Feb 13 2023  1:50PM (02-13-23 @ 13:22)      CARDIOLOGY TESTING      MEDICATIONS  magnesium sulfate  IVPB 2 IV Intermittent once  vancomycin    Solution 125 Oral every 6 hours      Weight  Weight (kg): 63.5 (02-13-23 @ 11:16)    ANTIBIOTICS:  vancomycin    Solution 125 milliGRAM(s) Oral every 6 hours      ALLERGIES:  aspirin (Short breath)  erythromycin (Other)  penicillins (Unknown)  sulfa  patient states that stitches needed to be re-opened after suture with sulfa material (Other)

## 2023-02-13 NOTE — ED PROVIDER NOTE - ATTENDING CONTRIBUTION TO CARE
I personally evaluated patient. I agree with the findings and plan with all documentation on chart except as documented  in my note.    57-year-old female past medical history of diverticulosis, recent C. difficile colitis status post p.o. vancomycin course who presents to the emergency department with acute onset of lower abdominal pain and numerous watery stools per day.  Patient completed p.o. vancomycin course last week and was asymptomatic until yesterday, when patient began having diarrhea and pain consistent with prior C. difficile colitis episode.  Patient denies any urinary symptoms, fever, blood in stool.    On exam, vital signs reviewed.  Patient appears mildly dehydrated.  Patient with lower abdominal tenderness no guarding or rebound.  No CVA tenderness.  Gross neurological exam is unremarkable.  IV placed, labs sent, C. difficile testing sent, patient had CT scan of abdomen pelvis.  CT scan shows colitis that was less severe than prior episode.  Clinical picture consistent with recurrent C. difficile colitis.  Patient to be admitted for IV hydration and will start appropriate antibiotics.    ED work up reviewed and results and plan of care discussed with patient. Patient requires admission for further work up, monitoring, and management. Need for admission discussed with patient.

## 2023-02-13 NOTE — H&P ADULT - ASSESSMENT
57 year old female PMH of diverticulosis and recent admission for c. diff colitis presents for recurrent c. diff.     # recurrent non-fulminant c. diff colitis; severe  #hypomagnesemia   - CTAP: Mildly improved colitis from the left colon and rectum  - ID consult  - as first recurrence, can start fidaxamicin 200 mg BID -- plan for 10 days   - trend WBC   - monitor stool count   - replete electrolytes as needed  - encourage PO fluid intake     #Misc  - DVT Prophylaxis: LMWH  - Diet: regular   - Activity: AAT

## 2023-02-14 PROCEDURE — 99233 SBSQ HOSP IP/OBS HIGH 50: CPT

## 2023-02-14 RX ORDER — MORPHINE SULFATE 50 MG/1
4 CAPSULE, EXTENDED RELEASE ORAL ONCE
Refills: 0 | Status: DISCONTINUED | OUTPATIENT
Start: 2023-02-14 | End: 2023-02-14

## 2023-02-14 RX ORDER — MAGNESIUM SULFATE 500 MG/ML
2 VIAL (ML) INJECTION ONCE
Refills: 0 | Status: COMPLETED | OUTPATIENT
Start: 2023-02-14 | End: 2023-02-14

## 2023-02-14 RX ORDER — FAMOTIDINE 10 MG/ML
20 INJECTION INTRAVENOUS ONCE
Refills: 0 | Status: COMPLETED | OUTPATIENT
Start: 2023-02-14 | End: 2023-02-14

## 2023-02-14 RX ORDER — SODIUM CHLORIDE 9 MG/ML
1000 INJECTION, SOLUTION INTRAVENOUS ONCE
Refills: 0 | Status: COMPLETED | OUTPATIENT
Start: 2023-02-14 | End: 2023-02-14

## 2023-02-14 RX ADMIN — Medication 25 GRAM(S): at 17:00

## 2023-02-14 RX ADMIN — FAMOTIDINE 20 MILLIGRAM(S): 10 INJECTION INTRAVENOUS at 20:49

## 2023-02-14 RX ADMIN — Medication 650 MILLIGRAM(S): at 13:30

## 2023-02-14 RX ADMIN — Medication 650 MILLIGRAM(S): at 13:00

## 2023-02-14 RX ADMIN — SODIUM CHLORIDE 100 MILLILITER(S): 9 INJECTION, SOLUTION INTRAVENOUS at 00:53

## 2023-02-14 RX ADMIN — MORPHINE SULFATE 4 MILLIGRAM(S): 50 CAPSULE, EXTENDED RELEASE ORAL at 05:25

## 2023-02-14 RX ADMIN — FIDAXOMICIN 200 MILLIGRAM(S): 200 GRANULE, FOR SUSPENSION ORAL at 20:49

## 2023-02-14 RX ADMIN — Medication 650 MILLIGRAM(S): at 00:53

## 2023-02-14 RX ADMIN — FAMOTIDINE 20 MILLIGRAM(S): 10 INJECTION INTRAVENOUS at 00:53

## 2023-02-14 RX ADMIN — SODIUM CHLORIDE 100 MILLILITER(S): 9 INJECTION, SOLUTION INTRAVENOUS at 11:44

## 2023-02-14 RX ADMIN — FIDAXOMICIN 200 MILLIGRAM(S): 200 GRANULE, FOR SUSPENSION ORAL at 08:38

## 2023-02-14 NOTE — PROGRESS NOTE ADULT - SUBJECTIVE AND OBJECTIVE BOX
EFREN HONG  57y  [facility] ED Hold [address]      Patient is a 57y old  Female who presents with a chief complaint of recurrent severe c. diff (13 Feb 2023 16:22)      INTERVAL HPI/OVERNIGHT EVENTS:        REVIEW OF SYSTEMS:        FAMILY HISTORY:    T(C): 36.7 (02-14-23 @ 07:40), Max: 37.3 (02-13-23 @ 11:16)  HR: 94 (02-14-23 @ 07:40) (90 - 125)  BP: 104/88 (02-14-23 @ 07:40) (101/54 - 104/88)  RR: 17 (02-14-23 @ 07:40) (17 - 18)  SpO2: 99% (02-14-23 @ 07:40) (95% - 99%)  Wt(kg): --Vital Signs Last 24 Hrs  T(C): 36.7 (14 Feb 2023 07:40), Max: 37.3 (13 Feb 2023 11:16)  T(F): 98 (14 Feb 2023 07:40), Max: 99.1 (13 Feb 2023 11:16)  HR: 94 (14 Feb 2023 07:40) (90 - 125)  BP: 104/88 (14 Feb 2023 07:40) (101/54 - 104/88)  BP(mean): --  RR: 17 (14 Feb 2023 07:40) (17 - 18)  SpO2: 99% (14 Feb 2023 07:40) (95% - 99%)    Parameters below as of 14 Feb 2023 07:40  Patient On (Oxygen Delivery Method): room air        PHYSICAL EXAM:  GENERAL: NAD, well-groomed, well-developed  HEAD:  Atraumatic, Normocephalic  EYES: EOMI, PERRLA, conjunctiva and sclera clear  ENMT: No tonsillar erythema, exudates, or enlargement; Moist mucous membranes, Good dentition, No lesions  NECK: Supple, No JVD, Normal thyroid  NERVOUS SYSTEM:  Alert & Oriented X3, Good concentration; Motor Strength 5/5 B/L upper and lower extremities; DTRs 2+ intact and symmetric  PULM: Clear to auscultation bilaterally  CARDIAC: Regular rate and rhythm; No murmurs, rubs, or gallops  GI: Soft, Nontender, Nondistended; Bowel sounds present  EXTREMITIES:  2+ Peripheral Pulses, No clubbing, cyanosis, or edema  LYMPH: No lymphadenopathy noted  SKIN: No rashes or lesions    Consultant(s) Notes Reviewed:  [x ] YES  [ ] NO  Care Discussed with Consultants/Other Providers [ x] YES  [ ] NO    LABS:                            14.3   17.70 )-----------( 232      ( 13 Feb 2023 12:30 )             42.7   02-13    140  |  102  |  15  ----------------------------<  118<H>  3.9   |  22  |  0.8    Ca    9.6      13 Feb 2023 12:30  Mg     1.5     02-13    TPro  6.6  /  Alb  4.1  /  TBili  1.6<H>  /  DBili  x   /  AST  19  /  ALT  16  /  AlkPhos  89  02-13            acetaminophen     Tablet .. 650 milliGRAM(s) Oral every 6 hours PRN  enoxaparin Injectable 40 milliGRAM(s) SubCutaneous every 24 hours  fidaxomicin 200 milliGRAM(s) Oral two times a day  lactated ringers. 1000 milliLiter(s) IV Continuous <Continuous>      This is a 57 year old female PMH of diverticulosis and recent admission for c. diff colitis presents for recurrent c. diff.     1.Sepsis (leukocytosis and tachycardia) due to  Recurrent non-fulminant c. diff colitis; severe  - Admit to medicine   - Cont faxamicin 200mg po bid d:1/10   - C.diff positive        - No need for blood cx as focus of infection is identified  - LR at 100 ml/hr   - CTAP: Mildly improved colitis from the left colon and rectum  - ID consult appreciated     2. Misc  - DVT Prophylaxis: LMWH  - Diet: regular   - Activity: AAT            Case Discussed with House Staff   39  minutes spent on total encounter; more than 50% of the visit was spent counseling and/or coordinating care by the attending physician.   Spectra [phone]     EFREN HONG  57y  [facility] ED Hold [address] A      Patient is a 57y old  Female who presents with a chief complaint of recurrent severe c. diff (13 Feb 2023 16:22)      INTERVAL HPI/OVERNIGHT EVENTS:  Patient still with active diarrhea and abdominal pain   receive one dose of morphine yest that I advise against. she understood our reasoning  no other events noted             FAMILY HISTORY:    T(C): 36.7 (02-14-23 @ 07:40), Max: 37.3 (02-13-23 @ 11:16)  HR: 94 (02-14-23 @ 07:40) (90 - 125)  BP: 104/88 (02-14-23 @ 07:40) (101/54 - 104/88)  RR: 17 (02-14-23 @ 07:40) (17 - 18)  SpO2: 99% (02-14-23 @ 07:40) (95% - 99%)  Wt(kg): --Vital Signs Last 24 Hrs  T(C): 36.7 (14 Feb 2023 07:40), Max: 37.3 (13 Feb 2023 11:16)  T(F): 98 (14 Feb 2023 07:40), Max: 99.1 (13 Feb 2023 11:16)  HR: 94 (14 Feb 2023 07:40) (90 - 125)  BP: 104/88 (14 Feb 2023 07:40) (101/54 - 104/88)  BP(mean): --  RR: 17 (14 Feb 2023 07:40) (17 - 18)  SpO2: 99% (14 Feb 2023 07:40) (95% - 99%)    Parameters below as of 14 Feb 2023 07:40  Patient On (Oxygen Delivery Method): room air        PHYSICAL EXAM:  GENERAL: NAD, well-groomed, well-developed  NERVOUS SYSTEM:  Alert & Oriented X3, Good concentration; Motor Strength 5/5 B/L upper and lower extremities; DTRs 2+ intact and symmetric  PULM: Clear to auscultation bilaterally  CARDIAC: Regular rate and rhythm; No murmurs, rubs, or gallops  GI: Soft, tender,distended; Bowel sounds present  EXTREMITIES:  2+ Peripheral Pulses, No clubbing, cyanosis, or edema    Consultant(s) Notes Reviewed:  [x ] YES  [ ] NO  Care Discussed with Consultants/Other Providers [ x] YES  [ ] NO    LABS:                            14.3   17.70 )-----------( 232      ( 13 Feb 2023 12:30 )             42.7   02-13    140  |  102  |  15  ----------------------------<  118<H>  3.9   |  22  |  0.8    Ca    9.6      13 Feb 2023 12:30  Mg     1.5     02-13    TPro  6.6  /  Alb  4.1  /  TBili  1.6<H>  /  DBili  x   /  AST  19  /  ALT  16  /  AlkPhos  89  02-13            acetaminophen     Tablet .. 650 milliGRAM(s) Oral every 6 hours PRN  enoxaparin Injectable 40 milliGRAM(s) SubCutaneous every 24 hours  fidaxomicin 200 milliGRAM(s) Oral two times a day  lactated ringers. 1000 milliLiter(s) IV Continuous <Continuous>      This is a 57 year old female PMH of diverticulosis and recent admission for c. diff colitis presents for recurrent c. diff.     1.Sepsis (leukocytosis and tachycardia) due to  Recurrent non-fulminant c. diff colitis; severe  - Admit to medicine   - Cont faxamicin 200mg po bid d:1/10   - Avoid opioid   - C.diff positive        - No need for blood cx as focus of infection is identified  - LR at 100 ml/hr   - CTAP: Mildly improved colitis from the left colon and rectum  - ID consult appreciated     2. Misc  - DVT Prophylaxis: LMWH  - Diet: regular   - Activity: AAT

## 2023-02-14 NOTE — ED ADULT NURSE REASSESSMENT NOTE - NS ED NURSE REASSESS COMMENT FT1
Patient reassessed. A&O x4. VSS. Patient denies pain/discomfort at this time. Call bell in reach. Safety & comfort measures maintained.

## 2023-02-15 LAB
ALBUMIN SERPL ELPH-MCNC: 3.2 G/DL — LOW (ref 3.5–5.2)
ALP SERPL-CCNC: 87 U/L — SIGNIFICANT CHANGE UP (ref 30–115)
ALT FLD-CCNC: 11 U/L — SIGNIFICANT CHANGE UP (ref 0–41)
ANION GAP SERPL CALC-SCNC: 9 MMOL/L — SIGNIFICANT CHANGE UP (ref 7–14)
AST SERPL-CCNC: 12 U/L — SIGNIFICANT CHANGE UP (ref 0–41)
BASOPHILS # BLD AUTO: 0.02 K/UL — SIGNIFICANT CHANGE UP (ref 0–0.2)
BASOPHILS NFR BLD AUTO: 0.4 % — SIGNIFICANT CHANGE UP (ref 0–1)
BILIRUB SERPL-MCNC: 0.3 MG/DL — SIGNIFICANT CHANGE UP (ref 0.2–1.2)
BUN SERPL-MCNC: 8 MG/DL — LOW (ref 10–20)
CALCIUM SERPL-MCNC: 8.9 MG/DL — SIGNIFICANT CHANGE UP (ref 8.4–10.5)
CHLORIDE SERPL-SCNC: 108 MMOL/L — SIGNIFICANT CHANGE UP (ref 98–110)
CO2 SERPL-SCNC: 23 MMOL/L — SIGNIFICANT CHANGE UP (ref 17–32)
CREAT SERPL-MCNC: 0.5 MG/DL — LOW (ref 0.7–1.5)
EGFR: 109 ML/MIN/1.73M2 — SIGNIFICANT CHANGE UP
EOSINOPHIL # BLD AUTO: 0.33 K/UL — SIGNIFICANT CHANGE UP (ref 0–0.7)
EOSINOPHIL NFR BLD AUTO: 6.3 % — SIGNIFICANT CHANGE UP (ref 0–8)
GLUCOSE SERPL-MCNC: 86 MG/DL — SIGNIFICANT CHANGE UP (ref 70–99)
HCT VFR BLD CALC: 34.3 % — LOW (ref 37–47)
HGB BLD-MCNC: 11.3 G/DL — LOW (ref 12–16)
IMM GRANULOCYTES NFR BLD AUTO: 0.4 % — HIGH (ref 0.1–0.3)
LYMPHOCYTES # BLD AUTO: 0.93 K/UL — LOW (ref 1.2–3.4)
LYMPHOCYTES # BLD AUTO: 17.6 % — LOW (ref 20.5–51.1)
MAGNESIUM SERPL-MCNC: 1.9 MG/DL — SIGNIFICANT CHANGE UP (ref 1.8–2.4)
MCHC RBC-ENTMCNC: 30.4 PG — SIGNIFICANT CHANGE UP (ref 27–31)
MCHC RBC-ENTMCNC: 32.9 G/DL — SIGNIFICANT CHANGE UP (ref 32–37)
MCV RBC AUTO: 92.2 FL — SIGNIFICANT CHANGE UP (ref 81–99)
MONOCYTES # BLD AUTO: 0.47 K/UL — SIGNIFICANT CHANGE UP (ref 0.1–0.6)
MONOCYTES NFR BLD AUTO: 8.9 % — SIGNIFICANT CHANGE UP (ref 1.7–9.3)
NEUTROPHILS # BLD AUTO: 3.51 K/UL — SIGNIFICANT CHANGE UP (ref 1.4–6.5)
NEUTROPHILS NFR BLD AUTO: 66.4 % — SIGNIFICANT CHANGE UP (ref 42.2–75.2)
NRBC # BLD: 0 /100 WBCS — SIGNIFICANT CHANGE UP (ref 0–0)
PLATELET # BLD AUTO: 173 K/UL — SIGNIFICANT CHANGE UP (ref 130–400)
POTASSIUM SERPL-MCNC: 4.2 MMOL/L — SIGNIFICANT CHANGE UP (ref 3.5–5)
POTASSIUM SERPL-SCNC: 4.2 MMOL/L — SIGNIFICANT CHANGE UP (ref 3.5–5)
PROT SERPL-MCNC: 5.5 G/DL — LOW (ref 6–8)
RBC # BLD: 3.72 M/UL — LOW (ref 4.2–5.4)
RBC # FLD: 13.8 % — SIGNIFICANT CHANGE UP (ref 11.5–14.5)
SODIUM SERPL-SCNC: 140 MMOL/L — SIGNIFICANT CHANGE UP (ref 135–146)
WBC # BLD: 5.28 K/UL — SIGNIFICANT CHANGE UP (ref 4.8–10.8)
WBC # FLD AUTO: 5.28 K/UL — SIGNIFICANT CHANGE UP (ref 4.8–10.8)

## 2023-02-15 PROCEDURE — 99233 SBSQ HOSP IP/OBS HIGH 50: CPT

## 2023-02-15 RX ORDER — KETOROLAC TROMETHAMINE 30 MG/ML
15 SYRINGE (ML) INJECTION ONCE
Refills: 0 | Status: DISCONTINUED | OUTPATIENT
Start: 2023-02-15 | End: 2023-02-15

## 2023-02-15 RX ORDER — KETOROLAC TROMETHAMINE 30 MG/ML
10 SYRINGE (ML) INJECTION ONCE
Refills: 0 | Status: DISCONTINUED | OUTPATIENT
Start: 2023-02-15 | End: 2023-02-15

## 2023-02-15 RX ORDER — MAGNESIUM OXIDE 400 MG ORAL TABLET 241.3 MG
400 TABLET ORAL
Refills: 0 | Status: DISCONTINUED | OUTPATIENT
Start: 2023-02-15 | End: 2023-02-17

## 2023-02-15 RX ORDER — SIMETHICONE 80 MG/1
80 TABLET, CHEWABLE ORAL
Refills: 0 | Status: DISCONTINUED | OUTPATIENT
Start: 2023-02-15 | End: 2023-02-17

## 2023-02-15 RX ADMIN — FIDAXOMICIN 200 MILLIGRAM(S): 200 GRANULE, FOR SUSPENSION ORAL at 21:23

## 2023-02-15 RX ADMIN — MAGNESIUM OXIDE 400 MG ORAL TABLET 400 MILLIGRAM(S): 241.3 TABLET ORAL at 17:58

## 2023-02-15 RX ADMIN — SODIUM CHLORIDE 100 MILLILITER(S): 9 INJECTION, SOLUTION INTRAVENOUS at 19:00

## 2023-02-15 RX ADMIN — Medication 15 MILLIGRAM(S): at 01:22

## 2023-02-15 RX ADMIN — FIDAXOMICIN 200 MILLIGRAM(S): 200 GRANULE, FOR SUSPENSION ORAL at 09:17

## 2023-02-15 RX ADMIN — SIMETHICONE 80 MILLIGRAM(S): 80 TABLET, CHEWABLE ORAL at 17:58

## 2023-02-15 NOTE — PROGRESS NOTE ADULT - SUBJECTIVE AND OBJECTIVE BOX
GELYEFREN  57y, Female  Allergy: aspirin (Short breath)  erythromycin (Other)  penicillins (Unknown)  sulfa  patient states that stitches needed to be re-opened after suture with sulfa material (Other)    Hospital Day: 2d    Patient seen and examined. No acute events overnight    PMH/PSH:  PAST MEDICAL & SURGICAL HISTORY:  Osteoarthritis      Diverticulitis      GERD (gastroesophageal reflux disease)      History of umbilical hernia repair      History of bladder surgery      Cyst of neck          VITALS:  T(F): 98.7 (02-15-23 @ 08:05), Max: 98.9 (02-14-23 @ 15:26)  HR: 81 (02-15-23 @ 08:05)  BP: 125/71 (02-15-23 @ 08:05) (103/63 - 125/71)  RR: 18 (02-15-23 @ 08:05)  SpO2: 100% (02-15-23 @ 08:05)    TESTS & MEASUREMENTS:  Weight (Kg):   BMI (kg/m2): 24.8 (02-13)                          11.3   5.28  )-----------( 173      ( 15 Feb 2023 07:13 )             34.3       02-15    140  |  108  |  8<L>  ----------------------------<  86  4.2   |  23  |  0.5<L>    Ca    8.9      15 Feb 2023 07:13  Mg     1.9     02-15    TPro  5.5<L>  /  Alb  3.2<L>  /  TBili  0.3  /  DBili  x   /  AST  12  /  ALT  11  /  AlkPhos  87  02-15    LIVER FUNCTIONS - ( 15 Feb 2023 07:13 )  Alb: 3.2 g/dL / Pro: 5.5 g/dL / ALK PHOS: 87 U/L / ALT: 11 U/L / AST: 12 U/L / GGT: x                     RADIOLOGY & ADDITIONAL TESTS:    RECENT DIAGNOSTIC ORDERS:      MEDICATIONS:  MEDICATIONS  (STANDING):  enoxaparin Injectable 40 milliGRAM(s) SubCutaneous every 24 hours  fidaxomicin 200 milliGRAM(s) Oral two times a day  lactated ringers. 1000 milliLiter(s) (100 mL/Hr) IV Continuous <Continuous>  magnesium oxide 400 milliGRAM(s) Oral three times a day with meals  simethicone 80 milliGRAM(s) Chew two times a day    MEDICATIONS  (PRN):  acetaminophen     Tablet .. 650 milliGRAM(s) Oral every 6 hours PRN Temp greater or equal to 38C (100.4F), Mild Pain (1 - 3)      HOME MEDICATIONS:  Multi Vitamin+ (02-06)  Probiotic Formula (02-06)      REVIEW OF SYSTEMS:  All other review of systems is negative unless indicated above.     PHYSICAL EXAM:  PHYSICAL EXAM:  GENERAL: NAD, well-developed  HEAD:  Atraumatic, Normocephalic  NECK: Supple, No JVD  CHEST/LUNG: Clear to auscultation bilaterally; No wheeze  HEART: Regular rate and rhythm; No murmurs, rubs, or gallops  ABDOMEN: Soft, Nontender, Nondistended; Bowel sounds present  EXTREMITIES:  2+ Peripheral Pulses, No clubbing, cyanosis, or edema  SKIN: No rashes or lesions

## 2023-02-15 NOTE — PROGRESS NOTE ADULT - SUBJECTIVE AND OBJECTIVE BOX
GELY EFREN  57y, Female  Allergy: aspirin (Short breath)  erythromycin (Other)  penicillins (Unknown)  sulfa  patient states that stitches needed to be re-opened after suture with sulfa material (Other)      LOS  2d    CHIEF COMPLAINT: recurrent severe c. diff (14 Feb 2023 09:19)      INTERVAL EVENTS/HPI  - No acute events overnight  - T(F): , Max: 98.9 (02-14-23 @ 15:26)  - ongoing diarrhea -- reports less frequent yesterday -4-5x/yesterday   - WBC Count: 17.70 (02-13-23 @ 12:30)     - Creatinine, Serum: 0.8 (02-13-23 @ 12:30)       ROS  General: Denies rigors, nightsweats  HEENT: Denies headache, rhinorrhea, sore throat, eye pain  CV: Denies CP, palpitations  PULM: Denies wheezing, hemoptysis  GI: Denies hematemesis, hematochezia, melena  : Denies discharge, hematuria  MSK: Denies arthralgias, myalgias  SKIN: Denies rash, lesions  NEURO: Denies paresthesias, weakness  PSYCH: Denies depression, anxiety    VITALS:  T(F): 96.9, Max: 98.9 (02-14-23 @ 15:26)  HR: 82  BP: 121/70  RR: 18Vital Signs Last 24 Hrs  T(C): 36.1 (15 Feb 2023 00:33), Max: 37.2 (14 Feb 2023 15:26)  T(F): 96.9 (15 Feb 2023 00:33), Max: 98.9 (14 Feb 2023 15:26)  HR: 82 (15 Feb 2023 00:33) (82 - 92)  BP: 121/70 (15 Feb 2023 00:33) (103/63 - 121/70)  BP(mean): --  RR: 18 (15 Feb 2023 00:33) (18 - 18)  SpO2: 98% (15 Feb 2023 00:33) (96% - 98%)    Parameters below as of 15 Feb 2023 00:33  Patient On (Oxygen Delivery Method): room air        PHYSICAL EXAM:  Gen: NAD, resting in bed  HEENT: Normocephalic, atraumatic  Neck: supple, no lymphadenopathy  CV: Regular rate & regular rhythm  Lungs: decreased BS at bases, no fremitus  Abdomen: Soft, BS present  Ext: Warm, well perfused  Neuro: non focal, awake  Skin: no rash, no erythema  Lines: no phlebitis    FH: Non-contributory  Social Hx: Non-contributory    TESTS & MEASUREMENTS:                        14.3   17.70 )-----------( 232      ( 13 Feb 2023 12:30 )             42.7     02-13    140  |  102  |  15  ----------------------------<  118<H>  3.9   |  22  |  0.8    Ca    9.6      13 Feb 2023 12:30  Mg     1.5     02-13    TPro  6.6  /  Alb  4.1  /  TBili  1.6<H>  /  DBili  x   /  AST  19  /  ALT  16  /  AlkPhos  89  02-13      LIVER FUNCTIONS - ( 13 Feb 2023 12:30 )  Alb: 4.1 g/dL / Pro: 6.6 g/dL / ALK PHOS: 89 U/L / ALT: 16 U/L / AST: 19 U/L / GGT: x                 Lactate, Blood: 1.4 mmol/L (02-13-23 @ 12:30)      INFECTIOUS DISEASES TESTING  COVID-19 PCR: NotDetec (01-20-23 @ 22:08)      INFLAMMATORY MARKERS  C-Reactive Protein, Serum: 174.5 mg/L (01-23-23 @ 07:45)  Sedimentation Rate, Erythrocyte: 11 mm/Hr (01-23-23 @ 07:45)  Sedimentation Rate, Erythrocyte: 22 mm/Hr (01-22-23 @ 09:53)  C-Reactive Protein, Serum: 252.2 mg/L (01-22-23 @ 09:53)      RADIOLOGY & ADDITIONAL TESTS:  I have personally reviewed the last available Chest xray  CXR      CT  CT Abdomen and Pelvis w/ IV Cont:   ACC: [id number] EXAM:  CT ABDOMEN AND PELVIS IC   ORDERED BY: MAHESH ACHARYA     PROCEDURE DATE:  02/13/2023          INTERPRETATION:  CLINICAL STATEMENT: C. difficile    TECHNIQUE: Contiguous axial CT images were obtained from the lower chest   to the pubic symphysis following administration of 100cc Optiray 320   intravenous contrast.  Oral contrast was not administered.  Reformatted   images in the coronal and sagittal planes were acquired.    COMPARISON CT: 1/20/2023    OTHER STUDIES USED FOR CORRELATION: None.      FINDINGS:    LOWER CHEST: Unremarkable.    HEPATOBILIARY: Unremarkable.    SPLEEN: Unremarkable.    PANCREAS: Unremarkable.    ADRENAL GLANDS: Unremarkable.    KIDNEYS: Unremarkable.    ABDOMINOPELVIC NODES: Unremarkable.    PELVIC ORGANS: Unremarkable.    PERITONEUM/MESENTERY/BOWEL: Mild improvement in colitis from the left   colon to the rectum.    BONES/SOFT TISSUES: Unchanged.    OTHER:      IMPRESSION: Mildly improved colitis from the left colon and rectum    --- End of Report ---            JAMIE FOX MD; Attending Radiologist  This document has been electronically signed. Feb 13 2023  1:50PM (02-13-23 @ 13:22)      CARDIOLOGY TESTING  12 Lead ECG:   Ventricular Rate 125 BPM    Atrial Rate 125 BPM    P-R Interval 128 ms    QRS Duration 76 ms    Q-T Interval 306 ms    QTC Calculation(Bazett) 441 ms    P Axis 77 degrees    R Axis 55 degrees    T Axis 81 degrees    Diagnosis Line Sinus tachycardia  Right atrial enlargement  Borderline ECG    Confirmed by Ángel Nguyen ([id number]) on 2/14/2023 2:24:10 AM (02-13-23 @ 11:22)      MEDICATIONS  enoxaparin Injectable 40 SubCutaneous every 24 hours  fidaxomicin 200 Oral two times a day  lactated ringers. 1000 IV Continuous <Continuous>  simethicone 80 Chew two times a day      WEIGHT  Weight (kg): 63.5 (02-13-23 @ 11:16)      ANTIBIOTICS:  fidaxomicin 200 milliGRAM(s) Oral two times a day      All available historical records have been reviewed

## 2023-02-15 NOTE — PROGRESS NOTE ADULT - ASSESSMENT
ASSESSMENT   Patient is a 57 year old female PMH of diverticulosis presented for diarrhea.    IMPRESSION  #Recurrent C diff colitis  - C Diff by PCR Result: Detected: (01.22.23 @ 11:10)  - C Diff by PCR Result: Detected: (02.13.23 @ 12:30)    #Leukocytosis     #Obesity BMI (kg/m2): 24.8, 24.8  #DM   #Abx allergy: aspirin (Short breath)  erythromycin (Other)  sulfa  patient states that stitches needed to be re-opened after suture with sulfa material (Other)    RECOMMENDATIONS  - continue fidaxamicin 200 mg BID -- will plan taper of fidaxomicin 200 mg BID  (2/14-2/23), followed by 200 mg every other day for 20 days (2/24-3/5)   - please see if insurance can cover   - trend WBC   - monitor stool count     Please call or message on [employer] Teams if with any questions.  Spectra [phone]

## 2023-02-15 NOTE — PROGRESS NOTE ADULT - ASSESSMENT
57 year old female PMH of diverticulosis and recent admission for c. diff colitis presents for recurrent c. diff.     #Sepsis (leukocytosis and tachycardia)   #Recurrent non-fulminant c. diff colitis, severe  ID on board  - Cont fidaxomicin 200mg po bid. Per ID, plan for taper regimen on discharge  - LR at 100 ml/hr   - CTAP: Mildly improved colitis from the left colon and rectum  - ID consult appreciated     DVT PPX, Lovenox    #Progress Note Handoff  Pending (specify): Resolution of diarrhea  Family discussion: d/w pt regarding tx for c.diff colitis  Disposition: Home

## 2023-02-16 ENCOUNTER — TRANSCRIPTION ENCOUNTER (OUTPATIENT)
Age: 58
End: 2023-02-16

## 2023-02-16 LAB
ALBUMIN SERPL ELPH-MCNC: 3.6 G/DL — SIGNIFICANT CHANGE UP (ref 3.5–5.2)
ALP SERPL-CCNC: 103 U/L — SIGNIFICANT CHANGE UP (ref 30–115)
ALT FLD-CCNC: 11 U/L — SIGNIFICANT CHANGE UP (ref 0–41)
ANION GAP SERPL CALC-SCNC: 13 MMOL/L — SIGNIFICANT CHANGE UP (ref 7–14)
AST SERPL-CCNC: 11 U/L — SIGNIFICANT CHANGE UP (ref 0–41)
BASOPHILS # BLD AUTO: 0.02 K/UL — SIGNIFICANT CHANGE UP (ref 0–0.2)
BASOPHILS NFR BLD AUTO: 0.4 % — SIGNIFICANT CHANGE UP (ref 0–1)
BILIRUB SERPL-MCNC: 0.3 MG/DL — SIGNIFICANT CHANGE UP (ref 0.2–1.2)
BUN SERPL-MCNC: 9 MG/DL — LOW (ref 10–20)
CALCIUM SERPL-MCNC: 9.2 MG/DL — SIGNIFICANT CHANGE UP (ref 8.4–10.5)
CHLORIDE SERPL-SCNC: 108 MMOL/L — SIGNIFICANT CHANGE UP (ref 98–110)
CO2 SERPL-SCNC: 23 MMOL/L — SIGNIFICANT CHANGE UP (ref 17–32)
CREAT SERPL-MCNC: 0.5 MG/DL — LOW (ref 0.7–1.5)
EGFR: 109 ML/MIN/1.73M2 — SIGNIFICANT CHANGE UP
EOSINOPHIL # BLD AUTO: 0.43 K/UL — SIGNIFICANT CHANGE UP (ref 0–0.7)
EOSINOPHIL NFR BLD AUTO: 9.3 % — HIGH (ref 0–8)
GLUCOSE SERPL-MCNC: 93 MG/DL — SIGNIFICANT CHANGE UP (ref 70–99)
HCT VFR BLD CALC: 35 % — LOW (ref 37–47)
HGB BLD-MCNC: 11.8 G/DL — LOW (ref 12–16)
IMM GRANULOCYTES NFR BLD AUTO: 0.4 % — HIGH (ref 0.1–0.3)
LYMPHOCYTES # BLD AUTO: 1.05 K/UL — LOW (ref 1.2–3.4)
LYMPHOCYTES # BLD AUTO: 22.8 % — SIGNIFICANT CHANGE UP (ref 20.5–51.1)
MAGNESIUM SERPL-MCNC: 1.8 MG/DL — SIGNIFICANT CHANGE UP (ref 1.8–2.4)
MCHC RBC-ENTMCNC: 30.6 PG — SIGNIFICANT CHANGE UP (ref 27–31)
MCHC RBC-ENTMCNC: 33.7 G/DL — SIGNIFICANT CHANGE UP (ref 32–37)
MCV RBC AUTO: 90.9 FL — SIGNIFICANT CHANGE UP (ref 81–99)
MONOCYTES # BLD AUTO: 0.51 K/UL — SIGNIFICANT CHANGE UP (ref 0.1–0.6)
MONOCYTES NFR BLD AUTO: 11.1 % — HIGH (ref 1.7–9.3)
NEUTROPHILS # BLD AUTO: 2.57 K/UL — SIGNIFICANT CHANGE UP (ref 1.4–6.5)
NEUTROPHILS NFR BLD AUTO: 56 % — SIGNIFICANT CHANGE UP (ref 42.2–75.2)
NRBC # BLD: 0 /100 WBCS — SIGNIFICANT CHANGE UP (ref 0–0)
PLATELET # BLD AUTO: 184 K/UL — SIGNIFICANT CHANGE UP (ref 130–400)
POTASSIUM SERPL-MCNC: 4.3 MMOL/L — SIGNIFICANT CHANGE UP (ref 3.5–5)
POTASSIUM SERPL-SCNC: 4.3 MMOL/L — SIGNIFICANT CHANGE UP (ref 3.5–5)
PROT SERPL-MCNC: 6 G/DL — SIGNIFICANT CHANGE UP (ref 6–8)
RBC # BLD: 3.85 M/UL — LOW (ref 4.2–5.4)
RBC # FLD: 13.8 % — SIGNIFICANT CHANGE UP (ref 11.5–14.5)
SODIUM SERPL-SCNC: 144 MMOL/L — SIGNIFICANT CHANGE UP (ref 135–146)
WBC # BLD: 4.6 K/UL — LOW (ref 4.8–10.8)
WBC # FLD AUTO: 4.6 K/UL — LOW (ref 4.8–10.8)

## 2023-02-16 RX ORDER — FIDAXOMICIN 200 MG/5ML
1 GRANULE, FOR SUSPENSION ORAL
Qty: 5 | Refills: 0
Start: 2023-02-16 | End: 2023-02-25

## 2023-02-16 RX ORDER — FIDAXOMICIN 200 MG/5ML
1 GRANULE, FOR SUSPENSION ORAL
Qty: 19 | Refills: 0
Start: 2023-02-16 | End: 2023-03-04

## 2023-02-16 RX ORDER — FIDAXOMICIN 200 MG/5ML
1 GRANULE, FOR SUSPENSION ORAL
Qty: 14 | Refills: 0
Start: 2023-02-16 | End: 2023-02-22

## 2023-02-16 RX ADMIN — SODIUM CHLORIDE 100 MILLILITER(S): 9 INJECTION, SOLUTION INTRAVENOUS at 11:39

## 2023-02-16 RX ADMIN — Medication 650 MILLIGRAM(S): at 23:24

## 2023-02-16 RX ADMIN — Medication 650 MILLIGRAM(S): at 22:46

## 2023-02-16 RX ADMIN — FIDAXOMICIN 200 MILLIGRAM(S): 200 GRANULE, FOR SUSPENSION ORAL at 22:45

## 2023-02-16 RX ADMIN — SIMETHICONE 80 MILLIGRAM(S): 80 TABLET, CHEWABLE ORAL at 11:40

## 2023-02-16 RX ADMIN — SIMETHICONE 80 MILLIGRAM(S): 80 TABLET, CHEWABLE ORAL at 22:45

## 2023-02-16 RX ADMIN — Medication 650 MILLIGRAM(S): at 07:04

## 2023-02-16 RX ADMIN — FIDAXOMICIN 200 MILLIGRAM(S): 200 GRANULE, FOR SUSPENSION ORAL at 11:39

## 2023-02-16 RX ADMIN — MAGNESIUM OXIDE 400 MG ORAL TABLET 400 MILLIGRAM(S): 241.3 TABLET ORAL at 11:40

## 2023-02-16 NOTE — DISCHARGE NOTE PROVIDER - NSDCMRMEDTOKEN_GEN_ALL_CORE_FT
fidaxomicin 200 mg oral tablet: 1 tab(s) orally every other day starting (2/24)-Ending (03/05)   fidaxomicin 200 mg oral tablet: 1 tab(s) orally every 12 hours (2/17-2/23)  Multi Vitamin+:   Probiotic Formula:    fidaxomicin 200 mg oral tablet: 1 tab(s) orally every 12 hours (2/17-2/23).  fidaxomicin 200 mg oral tablet: 1 tab(s) orally every 12 hours (2/17-2/23).   Then take 1 tablet orally every 48 hours (every other day) starting (2/24)-Ending (03/05).   Multi Vitamin+:   Probiotic Formula:

## 2023-02-16 NOTE — DISCHARGE NOTE PROVIDER - NSDCCPCAREPLAN_GEN_ALL_CORE_FT
PRINCIPAL DISCHARGE DIAGNOSIS  Diagnosis: C. difficile diarrhea  Assessment and Plan of Treatment:   Clostridioides difficile infection, also known as C. difficile or C. diff infection, happens when too much C. diff bacteria grows. This can cause severe diarrhea and inflammation of the colon (colitis). It is linked to recent use of antibiotic medicine.  This infection can be passed from person to person (is contagious). You also may be exposed to the bacteria from contact with food, water, or surfaces that have the bacteria on them.  What are the causes?  Certain bacteria live in the colon and help to digest food. This infection develops when the balance of helpful bacteria in the colon changes and the C. diff bacteria grow out of control. This is often caused by taking antibiotics.  What are the signs or symptoms?  Symptoms of this condition include:  •Diarrhea (three or more times a day) for several days.  •Fever.  •Loss of appetite.  •Nausea.  •Swelling, pain, cramping, or tenderness in the abdomen.  How is this diagnosed?  This condition is diagnosed with:  •Your medical history and a physical exam.  •Tests, which may include:  •A test for C. diff in your stool (feces).  •Blood tests.  •Imaging tests, such as a CT scan of your abdomen.  •A procedure in which your colon is examined. This is rare.  General instructions   •Wash your hands often with soap and water for at least 20 seconds. Bathe or shower using soap and water daily.  •Return to your normal activities as told by your health care provider. Ask your health care provider what activities are safe for you.  •Be sure your home is clean before you leave the hospital or clinic to go home. Then continue daily cleaning for at least a week.  •Keep all follow-up visits. This is important.  Contact a health care provider if:  •Your symptoms do not get better, or they get worse, even with treatment.  •Your symptoms go away and then come back.  •You have a fever.  •You develop new symptoms.  Get help right away if:  •You have more pain or tenderness in your abdomen  •You have stool that is mostly bloody, or looks black and       PRINCIPAL DISCHARGE DIAGNOSIS  Diagnosis: C. difficile diarrhea  Assessment and Plan of Treatment: Clostridioides difficile infection, also known as C. difficile or C. diff infection, happens when too much C. diff bacteria grows. This can cause severe diarrhea and inflammation of the colon (colitis). It is linked to recent use of antibiotic medicine.  This infection can be passed from person to person (is contagious). You also may be exposed to the bacteria from contact with food, water, or surfaces that have the bacteria on them.  What are the causes?  Certain bacteria live in the colon and help to digest food. This infection develops when the balance of helpful bacteria in the colon changes and the C. diff bacteria grow out of control. This is often caused by taking antibiotics.  What are the signs or symptoms?  Symptoms of this condition include:  •Diarrhea (three or more times a day) for several days.  •Fever.  •Loss of appetite.  •Nausea.  •Swelling, pain, cramping, or tenderness in the abdomen.  General instructions   •Wash your hands often with soap and water for at least 20 seconds. Bathe or shower using soap and water daily.  •Return to your normal activities as told by your health care provider. Ask your health care provider what activities are safe for you.  •Be sure your home is clean before you leave the hospital or clinic to go home. Then continue daily cleaning for at least a week.  •Keep all follow-up visits. This is important.  Contact a health care provider if:  •Your symptoms do not get better, or they get worse, even with treatment.  •Your symptoms go away and then come back.  •You have a fever.  •You develop new symptoms.  Get help right away if:  •You have more pain or tenderness in your abdomen  •You have stool that is mostly bloody, or looks black  Please take your antibiotics as prescribed and please follow up with your primary care doctor within one week of discharge.

## 2023-02-16 NOTE — DISCHARGE NOTE PROVIDER - HOSPITAL COURSE
57 year old female PMH of diverticulosis and recent admission for c. diff colitis presents for watery diarrhea. Patient was recently discharged 3 weeks ago for non-fulminant c. diff colitis. She was sent home on oral vancomycin and completed her 10 day course. Her symptoms had completely resolved. Two days prior to presentation, patient started to experience lower abdominal pain and copious amounts of watery diarrhea, non-bloody and decided to come to the hospital.  Otherwise denies denies any chest pain, fevers, nausea, vomiting, shortness of breath, urinary symptoms.     In the ED vitals stable.   Labs reveal WBC 17k, mag 1.5, bili 1.6.C. diff positive.   CTAP: Mildly improved colitis from the left colon and rectum  Patient was given 2L LR bolus and started on oral vancomycin.     1.Sepsis (leukocytosis and tachycardia) due to  Recurrent non-fulminant c. diff colitis; severe  - Cont faxamicin 200mg po bid d:1/10   - Avoid opioid   - C.diff positive        - No need for blood cx as focus of infection is identified  - LR at 100 ml/hr   - CTAP: Mildly improved colitis from the left colon and rectum  - ID consult appreciated cont. fidaxomicin 200mg PO with taper on discharge.    57 year old female PMH of diverticulosis and recent admission for c. diff colitis presents for watery diarrhea. Patient was recently discharged 3 weeks ago for non-fulminant c. diff colitis. She was sent home on oral vancomycin and completed her 10 day course. Her symptoms had completely resolved. Two days prior to presentation, patient started to experience lower abdominal pain and copious amounts of watery diarrhea, non-bloody and decided to come to the hospital.  Otherwise denies denies any chest pain, fevers, nausea, vomiting, shortness of breath, urinary symptoms.     In the ED vitals stable.   Labs reveal WBC 17k, mag 1.5, bili 1.6.C. diff positive.   CTAP: Mildly improved colitis from the left colon and rectum  Patient was given 2L LR bolus and started on oral vancomycin.     1.Sepsis (leukocytosis and tachycardia) due to  Recurrent non-fulminant c. diff colitis; severe  - Cont faxamicin 200mg po bid d:1/10   - Avoid opioid.   - C.diff positive        - No need for blood cx as focus of infection is identified  - LR at 100 ml/hr   - CTAP: Mildly improved colitis from the left colon and rectum  - ID consult appreciated cont. fidaxomicin 200mg PO with taper on discharge.

## 2023-02-16 NOTE — DISCHARGE NOTE PROVIDER - NSDCFUSCHEDAPPT_GEN_ALL_CORE_FT
Max Mckeon  [facility] Physician [facility]  GASTR[facility] Doc Off [phone]  Scheduled Appointment: 03/09/2023

## 2023-02-16 NOTE — PROGRESS NOTE ADULT - SUBJECTIVE AND OBJECTIVE BOX
GELYEFREN  57y, Female  Allergy: aspirin (Short breath)  erythromycin (Other)  penicillins (Unknown)  sulfa  patient states that stitches needed to be re-opened after suture with sulfa material (Other)    Hospital Day: 3d    Patient seen and examined. No acute events overnight    PMH/PSH:  PAST MEDICAL & SURGICAL HISTORY:  Osteoarthritis      Diverticulitis      GERD (gastroesophageal reflux disease)      History of umbilical hernia repair      History of bladder surgery      Cyst of neck          VITALS:  T(F): 98.9 (02-16-23 @ 09:07), Max: 99.3 (02-15-23 @ 15:36)  HR: 84 (02-16-23 @ 09:07)  BP: 109/61 (02-16-23 @ 09:07) (109/61 - 134/74)  RR: 18 (02-16-23 @ 09:07)  SpO2: 98% (02-16-23 @ 09:07)    TESTS & MEASUREMENTS:  Weight (Kg):   BMI (kg/m2): 24.8 (02-13)                          11.8   4.60  )-----------( 184      ( 16 Feb 2023 06:03 )             35.0       02-16    144  |  108  |  9<L>  ----------------------------<  93  4.3   |  23  |  0.5<L>    Ca    9.2      16 Feb 2023 06:03  Mg     1.8     02-16    TPro  6.0  /  Alb  3.6  /  TBili  0.3  /  DBili  x   /  AST  11  /  ALT  11  /  AlkPhos  103  02-16    LIVER FUNCTIONS - ( 16 Feb 2023 06:03 )  Alb: 3.6 g/dL / Pro: 6.0 g/dL / ALK PHOS: 103 U/L / ALT: 11 U/L / AST: 11 U/L / GGT: x                     RADIOLOGY & ADDITIONAL TESTS:    RECENT DIAGNOSTIC ORDERS:      MEDICATIONS:  MEDICATIONS  (STANDING):  enoxaparin Injectable 40 milliGRAM(s) SubCutaneous every 24 hours  fidaxomicin 200 milliGRAM(s) Oral two times a day  lactated ringers. 1000 milliLiter(s) (100 mL/Hr) IV Continuous <Continuous>  magnesium oxide 400 milliGRAM(s) Oral three times a day with meals  simethicone 80 milliGRAM(s) Chew two times a day    MEDICATIONS  (PRN):  acetaminophen     Tablet .. 650 milliGRAM(s) Oral every 6 hours PRN Temp greater or equal to 38C (100.4F), Mild Pain (1 - 3)      HOME MEDICATIONS:  Multi Vitamin+ (02-06)  Probiotic Formula (02-06)      REVIEW OF SYSTEMS:  All other review of systems is negative unless indicated above.     PHYSICAL EXAM:  PHYSICAL EXAM:  GENERAL: NAD, well-developed  HEAD:  Atraumatic, Normocephalic  NECK: Supple, No JVD  CHEST/LUNG: Clear to auscultation bilaterally; No wheeze  HEART: Regular rate and rhythm; No murmurs, rubs, or gallops  ABDOMEN: Soft, Nontender, Nondistended; Bowel sounds present  EXTREMITIES:  2+ Peripheral Pulses, No clubbing, cyanosis, or edema  SKIN: No rashes or lesions

## 2023-02-16 NOTE — ED ADULT NURSE REASSESSMENT NOTE - NS ED NURSE REASSESS COMMENT FT1
Pt. received from previous RN. Lying in stretcher, breathing with ease on RA. A&O x4. 22g noted to the L forearm; IVF running. Contact precautions maintained. Awaiting clean bed assignment.

## 2023-02-16 NOTE — PROGRESS NOTE ADULT - ASSESSMENT
57 year old female PMH of diverticulosis and recent admission for c. diff colitis presents for recurrent c. diff.     #Sepsis (leukocytosis and tachycardia)   #Recurrent non-fulminant c. diff colitis, severe  ID on board  WBC trended down  diarrhea improving, now loose stool  - Cont fidaxomicin 200mg po bid. Per ID, plan for taper regimen on discharge  - LR at 100 ml/hr   - CTAP: Mildly improved colitis from the left colon and rectum  - Plan for DC today if fidaxomicin is covered by insurance    DVT PPX, Lovenox    DC planning 57 year old female PMH of diverticulosis and recent admission for c. diff colitis presents for recurrent c. diff.     #Sepsis (leukocytosis and tachycardia)   #Recurrent non-fulminant c. diff colitis, severe  ID on board  WBC trended down  diarrhea improving, now loose stool  - Cont fidaxomicin 200mg po bid. Per ID, plan for taper regimen on discharge  - LR at 100 ml/hr   - CTAP: Mildly improved colitis from the left colon and rectum  - Plan for DC 24h, 50$ copay for fidaxomicin per [facility]    DVT PPX, Lovenox    DC planning tomorrow AM, noone at home to pick her up today. Will anticipate

## 2023-02-16 NOTE — DISCHARGE NOTE PROVIDER - NSDCFUADDINST_GEN_ALL_CORE_FT
Please take your medications as prescribed.  Please take your medications as prescribed.   you are being discharged on fidaxomicin 200 mg please take    1 tab(s) orally every 12 hours from (2/17-2/23).   Then take one tablet orally, every 48 hours (every other day) starting (2/24)-Ending (03/05).

## 2023-02-16 NOTE — DISCHARGE NOTE PROVIDER - CARE PROVIDER_API CALL
Brady Paez (DO)  Infectious Disease; Internal Medicine  [address] [place] [id number]  Phone: [phone]  Fax: [phone]  Follow Up Time: 1 week

## 2023-02-17 ENCOUNTER — TRANSCRIPTION ENCOUNTER (OUTPATIENT)
Age: 58
End: 2023-02-17

## 2023-02-17 VITALS
RESPIRATION RATE: 18 BRPM | TEMPERATURE: 97 F | DIASTOLIC BLOOD PRESSURE: 87 MMHG | OXYGEN SATURATION: 99 % | HEART RATE: 78 BPM | SYSTOLIC BLOOD PRESSURE: 128 MMHG

## 2023-02-17 LAB
ALBUMIN SERPL ELPH-MCNC: 3.5 G/DL — SIGNIFICANT CHANGE UP (ref 3.5–5.2)
ALP SERPL-CCNC: 94 U/L — SIGNIFICANT CHANGE UP (ref 30–115)
ALT FLD-CCNC: 11 U/L — SIGNIFICANT CHANGE UP (ref 0–41)
ANION GAP SERPL CALC-SCNC: 12 MMOL/L — SIGNIFICANT CHANGE UP (ref 7–14)
AST SERPL-CCNC: 9 U/L — SIGNIFICANT CHANGE UP (ref 0–41)
BASOPHILS # BLD AUTO: 0.05 K/UL — SIGNIFICANT CHANGE UP (ref 0–0.2)
BASOPHILS NFR BLD AUTO: 0.9 % — SIGNIFICANT CHANGE UP (ref 0–1)
BILIRUB SERPL-MCNC: 0.3 MG/DL — SIGNIFICANT CHANGE UP (ref 0.2–1.2)
BUN SERPL-MCNC: 14 MG/DL — SIGNIFICANT CHANGE UP (ref 10–20)
CALCIUM SERPL-MCNC: 9.5 MG/DL — SIGNIFICANT CHANGE UP (ref 8.4–10.5)
CHLORIDE SERPL-SCNC: 106 MMOL/L — SIGNIFICANT CHANGE UP (ref 98–110)
CO2 SERPL-SCNC: 23 MMOL/L — SIGNIFICANT CHANGE UP (ref 17–32)
CREAT SERPL-MCNC: 0.5 MG/DL — LOW (ref 0.7–1.5)
EGFR: 109 ML/MIN/1.73M2 — SIGNIFICANT CHANGE UP
EOSINOPHIL # BLD AUTO: 0.49 K/UL — SIGNIFICANT CHANGE UP (ref 0–0.7)
EOSINOPHIL NFR BLD AUTO: 9.2 % — HIGH (ref 0–8)
GLUCOSE SERPL-MCNC: 95 MG/DL — SIGNIFICANT CHANGE UP (ref 70–99)
HCT VFR BLD CALC: 37.9 % — SIGNIFICANT CHANGE UP (ref 37–47)
HGB BLD-MCNC: 12.6 G/DL — SIGNIFICANT CHANGE UP (ref 12–16)
IMM GRANULOCYTES NFR BLD AUTO: 0.9 % — HIGH (ref 0.1–0.3)
LYMPHOCYTES # BLD AUTO: 1.26 K/UL — SIGNIFICANT CHANGE UP (ref 1.2–3.4)
LYMPHOCYTES # BLD AUTO: 23.7 % — SIGNIFICANT CHANGE UP (ref 20.5–51.1)
MAGNESIUM SERPL-MCNC: 1.9 MG/DL — SIGNIFICANT CHANGE UP (ref 1.8–2.4)
MCHC RBC-ENTMCNC: 30.4 PG — SIGNIFICANT CHANGE UP (ref 27–31)
MCHC RBC-ENTMCNC: 33.2 G/DL — SIGNIFICANT CHANGE UP (ref 32–37)
MCV RBC AUTO: 91.3 FL — SIGNIFICANT CHANGE UP (ref 81–99)
MONOCYTES # BLD AUTO: 0.63 K/UL — HIGH (ref 0.1–0.6)
MONOCYTES NFR BLD AUTO: 11.9 % — HIGH (ref 1.7–9.3)
NEUTROPHILS # BLD AUTO: 2.83 K/UL — SIGNIFICANT CHANGE UP (ref 1.4–6.5)
NEUTROPHILS NFR BLD AUTO: 53.4 % — SIGNIFICANT CHANGE UP (ref 42.2–75.2)
NRBC # BLD: 0 /100 WBCS — SIGNIFICANT CHANGE UP (ref 0–0)
PLATELET # BLD AUTO: 202 K/UL — SIGNIFICANT CHANGE UP (ref 130–400)
POTASSIUM SERPL-MCNC: 4.3 MMOL/L — SIGNIFICANT CHANGE UP (ref 3.5–5)
POTASSIUM SERPL-SCNC: 4.3 MMOL/L — SIGNIFICANT CHANGE UP (ref 3.5–5)
PROT SERPL-MCNC: 6 G/DL — SIGNIFICANT CHANGE UP (ref 6–8)
RBC # BLD: 4.15 M/UL — LOW (ref 4.2–5.4)
RBC # FLD: 13.5 % — SIGNIFICANT CHANGE UP (ref 11.5–14.5)
SODIUM SERPL-SCNC: 141 MMOL/L — SIGNIFICANT CHANGE UP (ref 135–146)
WBC # BLD: 5.31 K/UL — SIGNIFICANT CHANGE UP (ref 4.8–10.8)
WBC # FLD AUTO: 5.31 K/UL — SIGNIFICANT CHANGE UP (ref 4.8–10.8)

## 2023-02-17 PROCEDURE — 99239 HOSP IP/OBS DSCHRG MGMT >30: CPT

## 2023-02-17 RX ADMIN — FIDAXOMICIN 200 MILLIGRAM(S): 200 GRANULE, FOR SUSPENSION ORAL at 08:23

## 2023-02-17 RX ADMIN — SIMETHICONE 80 MILLIGRAM(S): 80 TABLET, CHEWABLE ORAL at 08:23

## 2023-02-17 RX ADMIN — MAGNESIUM OXIDE 400 MG ORAL TABLET 400 MILLIGRAM(S): 241.3 TABLET ORAL at 08:23

## 2023-02-17 NOTE — CHART NOTE - NSCHARTNOTEFT_GEN_A_CORE
Pt was seen and examined at the bedside. no acute events overnight.  Diarrhea improving.  dc on fidaxomicin. f/u GI and ID as outpatient.

## 2023-02-17 NOTE — DISCHARGE NOTE NURSING/CASE MANAGEMENT/SOCIAL WORK - NSDCPEFALRISK_GEN_ALL_CORE
For information on Fall & Injury Prevention, visit: [email or web address] OR  [email or web address] OR  https://www.cdc.gov/steadi/patient.html

## 2023-02-17 NOTE — DISCHARGE NOTE NURSING/CASE MANAGEMENT/SOCIAL WORK - PATIENT PORTAL LINK FT
You can access the FollowMyHealth Patient Portal offered by [facility] by registering at the following website: [email or web address]. By joining [facility]’s FollowMyHealth portal, you will also be able to view your health information using other applications (apps) compatible with our system.

## 2023-02-21 DIAGNOSIS — Z88.3 ALLERGY STATUS TO OTHER ANTI-INFECTIVE AGENTS: ICD-10-CM

## 2023-02-21 DIAGNOSIS — E83.42 HYPOMAGNESEMIA: ICD-10-CM

## 2023-02-21 DIAGNOSIS — F60.9 PERSONALITY DISORDER, UNSPECIFIED: ICD-10-CM

## 2023-02-21 DIAGNOSIS — Z88.6 ALLERGY STATUS TO ANALGESIC AGENT: ICD-10-CM

## 2023-02-21 DIAGNOSIS — A41.9 SEPSIS, UNSPECIFIED ORGANISM: ICD-10-CM

## 2023-02-21 DIAGNOSIS — Z88.0 ALLERGY STATUS TO PENICILLIN: ICD-10-CM

## 2023-02-21 DIAGNOSIS — E86.0 DEHYDRATION: ICD-10-CM

## 2023-02-21 DIAGNOSIS — E11.9 TYPE 2 DIABETES MELLITUS WITHOUT COMPLICATIONS: ICD-10-CM

## 2023-02-21 DIAGNOSIS — E66.9 OBESITY, UNSPECIFIED: ICD-10-CM

## 2023-02-21 DIAGNOSIS — R19.7 DIARRHEA, UNSPECIFIED: ICD-10-CM

## 2023-02-21 DIAGNOSIS — A04.71 ENTEROCOLITIS DUE TO CLOSTRIDIUM DIFFICILE, RECURRENT: ICD-10-CM

## 2023-02-21 DIAGNOSIS — Z88.2 ALLERGY STATUS TO SULFONAMIDES: ICD-10-CM

## 2023-03-09 ENCOUNTER — APPOINTMENT (OUTPATIENT)
Dept: GASTROENTEROLOGY | Facility: CLINIC | Age: 58
End: 2023-03-09
Payer: COMMERCIAL

## 2023-03-09 DIAGNOSIS — Z00.00 ENCOUNTER FOR GENERAL ADULT MEDICAL EXAMINATION W/OUT ABNORMAL FINDINGS: ICD-10-CM

## 2023-03-09 DIAGNOSIS — A04.72 ENTEROCOLITIS DUE TO CLOSTRIDIUM DIFFICILE, NOT SPECIFIED AS RECURRENT: ICD-10-CM

## 2023-03-09 PROCEDURE — 99213 OFFICE O/P EST LOW 20 MIN: CPT | Mod: 95

## 2023-03-09 RX ORDER — POLYETHYLENE GLYCOL 3350 AND ELECTROLYTES WITH LEMON FLAVOR 236; 22.74; 6.74; 5.86; 2.97 G/4L; G/4L; G/4L; G/4L; G/4L
236 POWDER, FOR SOLUTION ORAL
Qty: 1 | Refills: 0 | Status: DISCONTINUED | COMMUNITY
Start: 2022-12-01 | End: 2023-03-09

## 2023-03-09 NOTE — ASSESSMENT
[FreeTextEntry1] : 57-year-old female with past medical history of Colonic Polyps, diverticulosis, prior colonic perforation in 2014 from complicated diverticulitis which was responsive to conservative measures at the time, recent hx of c diff colitis s/p treatment x 2 (vanco PO x10d in1/2023 then fidaxomicin x10 d 2/2023), here for f/u. \par \par last colonoscopy w/ Dr. Williamson done the descending colon could not be passed because of narrowing.  \par recent CT w/ colitis in the setting of c diff. \par no GI symptoms.\par recent labs and imaging reviewed and was unremarkable \par \par #c diff x 2 --> resolved\par #Diverticular disease complicated by prior perforation in 2014\par #Colonoscopy - incomplete due to narrowing of the descending colon\par \par - pt was previously on PPI which was discounted given c diff - keep off PPI for now as pt has no symptoms that warrant PPI therapy\par - Plan colonoscopy with smaller scope such as PCF or SIF scope\par - Risk and benefit explained in detail to the patient\par - Clear liquid diet the day before the procedure\par - Finish all of the prescribed prep as ordered to insure good visualization\par - Please refrain from any NSAID use including ASA one week before\par - Follow up 3-4 weeks after the procedure stressed to insure follow up on pathology and planning for future screenings \par - GoLYTELY and Dulcolax prescribed \par - f/u after procedure\par

## 2023-03-09 NOTE — HISTORY OF PRESENT ILLNESS
[Home] : at home, [unfilled] , at the time of the visit. [Medical Office: ([facility])___] : at the medical office located in  [Verbal consent obtained from patient] : the patient, [unfilled] [FreeTextEntry4] : ITALIA LOUIE [FreeTextEntry1] : 57-year-old female with past medical history of Colonic Polyps, diverticulosis, prior colonic perforation in 2014 from complicated diverticulitis which was responsive to conservative measures at the time, recent hx of c diff colitis s/p treatment x 2 (vanco PO x10d in1/2023 then fidaxomicin x10 d 2/2023), here for f/u. \par \par Last colonoscopy done w/ Dr. Williamson was incomplete as the descending colon could not be passed because of narrowing.  After colonoscopy CT scan was done which was without any acute abdominal pathology.  \par \par Interval history since last visit in December 2022 was significant for 2 episodes of C. difficile.  The first episode occurred late in January 2023 and the patient was found to have C. difficile which was treated with p.o. vancomycin with clinical improvement.\par \par She was discharged and was readmitted on February 14, 2023 with another episode of C. difficile which was treated with fidaxomicin.  She finished her fidaxomicin taper and has been doing well with no recurrent diarrhea.\par \par Now, she denies any GI complaints, has no abdominal pain, nausea or vomiting, no appetite changes, no dysphagia odynophagia. She lost some weight during hospitalization but gained it back.\par Review of systems otherwise negative.\par \par 1/20/2023 CT of the abdomen and pelvis with IV contrast revealing acute colitis from the mid transverse with the sigmoid colon without any evidence of abscess.\par 2/13/2023: CT of the abdomen and pelvis with IV contrast - Mildly improved colitis from the left colon and rectum\par \par PMHx: diverticulosis; prior colonic perforation in 2014 from complicated diverticulitis which was responsive to conservative measures\par PSHx: bladder surgery, hernia repair\par Fam hx: no fam hx of GI cancers \par Social hx: neg x 3\par Meds: none

## 2023-03-09 NOTE — PHYSICAL EXAM
[Normal] : alert, normal voice/communication, healthy appearing, no acute distress [Sclera] : the sclera and conjunctiva were normal [Hearing Threshold Finger Rub Not [place]] : hearing was normal [Normal Lips/Gums] : the lips and gums were normal [Normal Appearance] : the appearance of the neck was normal [No Respiratory Distress] : no respiratory distress [No Acc Muscle Use] : no accessory muscle use [Normal Color / Pigmentation] : normal skin color and pigmentation [No Focal Deficits] : no focal deficits [Oriented To Time, Place, And Person] : oriented to person, place, and time

## 2023-03-09 NOTE — ASSESSMENT
[FreeTextEntry1] : 57-year-old female with history of diverticulosis, Colonic Polyps, prior colonic perforation in 2014 from complicated diverticulitis which was responsive to conservative measures at the time who is here for second opinion for colonoscopy. \par last colonoscopy w/ Dr. Williamson done the descending colon could not be passed because of narrowing.  \par After colonoscopy CT scan was done which was without any acute abdominal pathology.  \par no GI symptoms\par \par #Diverticular disease complicated by prior perforation in 2014\par #Colonoscopy - incomplete due to narrowing of the descending colon\par - Plan colonoscopy with smaller scope such as peds Colonoscope or SIF scope\par - Risk and benefit explained in detail to the patient\par - Clear liquid diet the day before the procedure\par - Finish all of the prescribed prep as ordered to insure good visualization\par - Please refrain from any NSAID use including ASA one week before\par - Follow up 3-4 weeks after the procedure stressed to insure follow up on pathology and planning for future screenings \par - GoLYTELY and Dulcolax prescribed \par \par

## 2023-03-09 NOTE — HISTORY OF PRESENT ILLNESS
[FreeTextEntry4] : ITALIA LOUIE [FreeTextEntry1] : Patient is a 57-year-old female with past medical history of diverticulosis, Colonic Polyps , prior colonic perforation in 2014 from complicated diverticulitis which was responsive to conservative measures at the time, here for second opinion for colonoscopy.\par Last colonoscopy done w/ Dr. Williamson was incomplete as the descending colon could not be passed because of narrowing.  After colonoscopy CT scan was done which was without any acute abdominal pathology.  \par Currently without any abdominal complaints.  \par Currently recovering from an upper respiratory infection.\par \par PMHx: diverticulosis; prior colonic perforation in 2014 from complicated diverticulitis which was responsive to conservative measures\par PSHx: bladder surgery, hernia repair\par Fam hx: no fam hx of GI cancers \par Social hx: neg x 3\par Meds: none

## 2023-05-10 ENCOUNTER — RESULT REVIEW (OUTPATIENT)
Age: 58
End: 2023-05-10

## 2023-05-10 ENCOUNTER — OUTPATIENT (OUTPATIENT)
Dept: INPATIENT UNIT | Facility: HOSPITAL | Age: 58
LOS: 1 days | Discharge: HOME | End: 2023-05-10
Payer: COMMERCIAL

## 2023-05-10 ENCOUNTER — TRANSCRIPTION ENCOUNTER (OUTPATIENT)
Age: 58
End: 2023-05-10

## 2023-05-10 VITALS
HEART RATE: 80 BPM | DIASTOLIC BLOOD PRESSURE: 67 MMHG | SYSTOLIC BLOOD PRESSURE: 93 MMHG | HEIGHT: 63 IN | TEMPERATURE: 98 F | RESPIRATION RATE: 18 BRPM | WEIGHT: 139.99 LBS

## 2023-05-10 VITALS
OXYGEN SATURATION: 100 % | SYSTOLIC BLOOD PRESSURE: 126 MMHG | HEART RATE: 77 BPM | RESPIRATION RATE: 18 BRPM | DIASTOLIC BLOOD PRESSURE: 58 MMHG

## 2023-05-10 DIAGNOSIS — Z98.890 OTHER SPECIFIED POSTPROCEDURAL STATES: Chronic | ICD-10-CM

## 2023-05-10 DIAGNOSIS — D64.9 ANEMIA, UNSPECIFIED: ICD-10-CM

## 2023-05-10 DIAGNOSIS — L72.3 SEBACEOUS CYST: Chronic | ICD-10-CM

## 2023-05-10 PROCEDURE — 88305 TISSUE EXAM BY PATHOLOGIST: CPT

## 2023-05-10 PROCEDURE — 88305 TISSUE EXAM BY PATHOLOGIST: CPT | Mod: 26

## 2023-05-10 PROCEDURE — 45385 COLONOSCOPY W/LESION REMOVAL: CPT

## 2023-05-10 NOTE — ASU DISCHARGE PLAN (ADULT/PEDIATRIC) - CARE PROVIDER_API CALL
Max Mckeon)  Gastroenterology; Internal Medicine  [address] [place] [address]  Phone: [phone]  Fax: [phone]  Follow Up Time:

## 2023-05-10 NOTE — ASU DISCHARGE PLAN (ADULT/PEDIATRIC) - NS MD DC FALL RISK RISK
For information on Fall & Injury Prevention, visit: [email or web address] OR  [email or web address] OR  https://www.cdc.gov/steadi/patient.html

## 2023-05-10 NOTE — ASU PATIENT PROFILE, ADULT - NSICDXPASTMEDICALHX_GEN_ALL_CORE_FT
PAST MEDICAL HISTORY:  Diverticulitis     GERD (gastroesophageal reflux disease)     History of Clostridium difficile infection jan 2023    Osteoarthritis

## 2023-05-10 NOTE — CHART NOTE - NSCHARTNOTEFT_GEN_A_CORE
PACU ANESTHESIA ADMISSION NOTE      Procedure:   Post op diagnosis:      ____  Intubated  TV:______       Rate: ______      FiO2: ______    _x___  Patent Airway    _x___  Full return of protective reflexes    _x___  Full recovery from anesthesia / back to baseline status    Vitals:    BP  123/67  P  67  R  15  Sat  98    Mental Status:  _x___ Awake   _____ Alert   _____ Drowsy   _____ Sedated    Nausea/Vomiting:  _x___  NO       ______Yes,   See Post - Op Orders         Pain Scale (0-10):  __0___    Treatment: _x___ None    ____ See Post - Op/PCA Orders    Post - Operative Fluids:   __x__ Oral   ____ See Post - Op Orders    Plan: Discharge:   _x___Home       _____Floor     _____Critical Care    _____  Other:_________________    Comments:  No anesthesia issues or complications noted.  Discharge when criteria met.

## 2023-05-12 LAB — SURGICAL PATHOLOGY STUDY: SIGNIFICANT CHANGE UP

## 2023-05-15 DIAGNOSIS — Z88.2 ALLERGY STATUS TO SULFONAMIDES: ICD-10-CM

## 2023-05-15 DIAGNOSIS — Z88.0 ALLERGY STATUS TO PENICILLIN: ICD-10-CM

## 2023-05-15 DIAGNOSIS — K63.89 OTHER SPECIFIED DISEASES OF INTESTINE: ICD-10-CM

## 2023-05-15 DIAGNOSIS — K64.4 RESIDUAL HEMORRHOIDAL SKIN TAGS: ICD-10-CM

## 2023-05-15 DIAGNOSIS — Z12.11 ENCOUNTER FOR SCREENING FOR MALIGNANT NEOPLASM OF COLON: ICD-10-CM

## 2023-05-15 DIAGNOSIS — D12.2 BENIGN NEOPLASM OF ASCENDING COLON: ICD-10-CM

## 2023-05-25 ENCOUNTER — APPOINTMENT (OUTPATIENT)
Dept: GASTROENTEROLOGY | Facility: CLINIC | Age: 58
End: 2023-05-25
Payer: COMMERCIAL

## 2023-05-25 DIAGNOSIS — D12.6 BENIGN NEOPLASM OF COLON, UNSPECIFIED: ICD-10-CM

## 2023-05-25 PROCEDURE — 99213 OFFICE O/P EST LOW 20 MIN: CPT | Mod: 95

## 2023-05-25 NOTE — HISTORY OF PRESENT ILLNESS
[Home] : at home, [unfilled] , at the time of the visit. [Medical Office: ([facility])___] : at the medical office located in  [Verbal consent obtained from patient] : the patient, [unfilled] [FreeTextEntry4] : ITALIA LOUIE [FreeTextEntry1] : LAST VISIT - 3/9/23\par \par 57-year-old female with past medical history of Colonic Polyps, diverticulosis, prior colonic perforation in 2014 from complicated diverticulitis which was responsive to conservative measures at the time, recent hx of c diff colitis s/p treatment x 2 (vanco PO x10d in1/2023 then fidaxomicin x10 d 2/2023), TA of the colon, here for f/u. \par \par colonoscopy on 5/10/23 w/ good prep revealed a 5 mm TA in the ascending colon.\par \par Today, patient has no GI complaints however, she is complaining of low energy levels.\par She notes that she has a disturbed sleep cycle and does not sleep well at night.\par In addition, her diet is low with carbohydrates.\par \par Now, she denies any GI complaints, has no abdominal pain, nausea or vomiting, no appetite changes, no dysphagia odynophagia. She lost some weight during hospitalization but gained it back.\par Review of systems otherwise negative.\par \par Colonoscopy 5/10/2023:\par 5 mm ascending colon tubular adenoma status post cold snare polypectomy, diverticulosis, melanosis coli, tortuous sigmoid, external hemorrhoids\par \par 1/20/2023 CT of the abdomen and pelvis with IV contrast revealing acute colitis from the mid transverse with the sigmoid colon without any evidence of abscess.\par 2/13/2023: CT of the abdomen and pelvis with IV contrast - Mildly improved colitis from the left colon and rectum\par \par PMHx: diverticulosis; prior colonic perforation in 2014 from complicated diverticulitis which was responsive to conservative measures\par PSHx: bladder surgery, hernia repair\par Fam hx: no fam hx of GI cancers \par Social hx: neg x 3\par Meds: none \par \par RECAP:\par -prior colonoscopy done w/ Dr. Williamson was incomplete as the descending colon could not be passed because of narrowing. After colonoscopy CT scan was done which was without any acute abdominal pathology. \par -pt had 2 episodes of C. difficile. The first episode occurred late in January 2023 and the patient was found to have C. difficile which was treated with p.o. vancomycin with clinical improvement.\par -She was discharged and was readmitted on February 14, 2023 with another episode of C. difficile which was treated with fidaxomicin. She finished her fidaxomicin taper and has been doing well with no recurrent diarrhea.

## 2023-05-25 NOTE — ASSESSMENT
[FreeTextEntry1] : 57-year-old female with past medical history of Colonic Polyps, diverticulosis, prior colonic perforation in 2014 from complicated diverticulitis which was responsive to conservative measures at the time, recent hx of c diff colitis s/p treatment x 2 (vanco PO x10d in1/2023 then fidaxomicin x10 d 2/2023), TA of the colon, here for f/u. \par \par -colonoscopy on 5/10/23 w/ good prep - 5 mm ascending colon tubular adenoma status post cold snare polypectomy, diverticulosis, melanosis coli, tortuous sigmoid, external hemorrhoids\par -prior colonoscopy w/ Dr. Williamson done the descending colon could not be passed because of narrowing. \par -prior CT w/ colitis in the setting of c diff. \par -no GI symptoms.\par -recent labs and imaging reviewed and was unremarkable \par \par #tubular adenoma of the colon\par #c diff x 2 --> resolved\par #Diverticular disease complicated by prior perforation in 2014\par #fatigue and decreased energy\par \par - pt was previously on PPI which was discontinued given c diff - keep off PPI for now as pt has no symptoms that warrant PPI therapy\par - high fiber diet\par - encouraged to have good sleep hygiene and eat a well balanced diet\par - f/u w/ PCP for low energy levels\par - rpt colonoscopy in 5 y \par - f/u w/ us PRN

## 2023-05-25 NOTE — PHYSICAL EXAM
[Normal] : alert, normal voice/communication, healthy appearing, no acute distress [Sclera] : the sclera and conjunctiva were normal [Hearing Threshold Finger Rub Not [place]] : hearing was normal [Normal Lips/Gums] : the lips and gums were normal [Normal Appearance] : the appearance of the neck was normal [No Respiratory Distress] : no respiratory distress [No Acc Muscle Use] : no accessory muscle use [Normal Color / Pigmentation] : normal skin color and pigmentation [No Focal Deficits] : no focal deficits [Oriented To Time, Place, And Person] : oriented to person, place, and time

## 2023-06-05 NOTE — ED PROVIDER NOTE - MDM ORDERS SUBMITTED SELECTION
Labs/Imaging Studies/Medications Quinolones Counseling:  I discussed with the patient the risks of fluoroquinolones including but not limited to GI upset, allergic reaction, drug rash, diarrhea, dizziness, photosensitivity, yeast infections, liver function test abnormalities, tendonitis/tendon rupture.

## 2023-06-21 NOTE — BH CONSULTATION LIAISON ASSESSMENT NOTE - NSBHCONSULTMEDPRN_PSY_A_CORE
Patient is c/o abd pain, denies cp/sob/syncope.   Vitals reviewed.   Lungs: CTA, no wheezing, no crackles.  Abd: +BS, +generalized tenderness, ND, soft,   A/P: Abd pain,   labs, reevaluation.
no

## 2023-09-08 ENCOUNTER — NON-APPOINTMENT (OUTPATIENT)
Age: 58
End: 2023-09-08

## 2023-09-08 PROBLEM — Z86.19 PERSONAL HISTORY OF OTHER INFECTIOUS AND PARASITIC DISEASES: Chronic | Status: ACTIVE | Noted: 2023-05-10

## 2023-10-05 ENCOUNTER — APPOINTMENT (OUTPATIENT)
Dept: GASTROENTEROLOGY | Facility: CLINIC | Age: 58
End: 2023-10-05
Payer: COMMERCIAL

## 2023-10-05 DIAGNOSIS — R10.2 PELVIC AND PERINEAL PAIN: ICD-10-CM

## 2023-10-05 DIAGNOSIS — R19.7 DIARRHEA, UNSPECIFIED: ICD-10-CM

## 2023-10-05 PROCEDURE — 99442: CPT

## 2023-10-06 PROBLEM — R19.7 ACUTE DIARRHEA: Status: ACTIVE | Noted: 2023-01-20

## 2023-10-06 PROBLEM — R10.2 ACUTE PELVIC PAIN: Status: ACTIVE | Noted: 2022-02-17

## 2023-11-13 ENCOUNTER — NON-APPOINTMENT (OUTPATIENT)
Age: 58
End: 2023-11-13

## 2023-11-13 DIAGNOSIS — N64.89 OTHER SPECIFIED DISORDERS OF BREAST: ICD-10-CM

## 2023-11-16 ENCOUNTER — NON-APPOINTMENT (OUTPATIENT)
Age: 58
End: 2023-11-16

## 2023-11-16 DIAGNOSIS — N60.02 SOLITARY CYST OF LEFT BREAST: ICD-10-CM

## 2023-11-24 NOTE — H&P ADULT - NSHPREVIEWOFSYSTEMS_GEN_ALL_CORE
REVIEW OF SYSTEMS:    CONSTITUTIONAL: No fever, weight loss, or fatigue  EYES: No eye pain, visual disturbances, or discharge  ENMT:  No difficulty hearing, tinnitus, vertigo; No sinus or throat pain  NECK: No pain or stiffness  BREASTS: No pain, masses, or nipple discharge  RESPIRATORY: No cough, wheezing, chills or hemoptysis; No shortness of breath  CARDIOVASCULAR: No chest pain, palpitations, dizziness, or leg swelling  GASTROINTESTINAL: see hpi   GENITOURINARY: No dysuria, frequency, hematuria, or incontinence  NEUROLOGICAL: No headaches, memory loss, loss of strength, numbness, or tremors  SKIN: No itching, burning, rashes, or lesions   LYMPH NODES: No enlarged glands  ENDOCRINE: No heat or cold intolerance; No hair loss  MUSCULOSKELETAL: No joint pain or swelling; No muscle, back, or extremity pain  PSYCHIATRIC: No depression, anxiety, mood swings, or difficulty sleeping  HEME/LYMPH: No easy bruising, or bleeding gums  ALLERGY AND IMMUNOLOGIC: No hives or eczema Private car

## 2023-11-30 ENCOUNTER — NON-APPOINTMENT (OUTPATIENT)
Age: 58
End: 2023-11-30

## 2023-12-05 ENCOUNTER — APPOINTMENT (OUTPATIENT)
Dept: PLASTIC SURGERY | Facility: CLINIC | Age: 58
End: 2023-12-05
Payer: COMMERCIAL

## 2023-12-05 VITALS — WEIGHT: 140 LBS | HEIGHT: 63 IN | BODY MASS INDEX: 24.8 KG/M2

## 2023-12-05 PROCEDURE — 99203 OFFICE O/P NEW LOW 30 MIN: CPT

## 2023-12-07 ENCOUNTER — APPOINTMENT (OUTPATIENT)
Dept: BREAST CENTER | Facility: CLINIC | Age: 58
End: 2023-12-07
Payer: COMMERCIAL

## 2023-12-07 VITALS
HEART RATE: 75 BPM | HEIGHT: 63 IN | BODY MASS INDEX: 24.8 KG/M2 | DIASTOLIC BLOOD PRESSURE: 85 MMHG | SYSTOLIC BLOOD PRESSURE: 105 MMHG | WEIGHT: 140 LBS

## 2023-12-07 DIAGNOSIS — N63.20 UNSPECIFIED LUMP IN THE LEFT BREAST, UNSPECIFIED QUADRANT: ICD-10-CM

## 2023-12-07 DIAGNOSIS — N60.12 DIFFUSE CYSTIC MASTOPATHY OF RIGHT BREAST: ICD-10-CM

## 2023-12-07 DIAGNOSIS — N60.11 DIFFUSE CYSTIC MASTOPATHY OF RIGHT BREAST: ICD-10-CM

## 2023-12-07 PROCEDURE — 99204 OFFICE O/P NEW MOD 45 MIN: CPT

## 2023-12-14 ENCOUNTER — APPOINTMENT (OUTPATIENT)
Dept: OBGYN | Facility: CLINIC | Age: 58
End: 2023-12-14
Payer: COMMERCIAL

## 2023-12-14 VITALS — DIASTOLIC BLOOD PRESSURE: 69 MMHG | HEIGHT: 63 IN | HEART RATE: 83 BPM | SYSTOLIC BLOOD PRESSURE: 105 MMHG

## 2023-12-14 DIAGNOSIS — Z01.419 ENCOUNTER FOR GYNECOLOGICAL EXAMINATION (GENERAL) (ROUTINE) W/OUT ABNORMAL FINDINGS: ICD-10-CM

## 2023-12-14 LAB
BILIRUB UR QL STRIP: NEGATIVE
CLARITY UR: CLEAR
COLLECTION METHOD: NORMAL
GLUCOSE UR-MCNC: NEGATIVE
HCG UR QL: 0.2 EU/DL
HGB UR QL STRIP.AUTO: NEGATIVE
KETONES UR-MCNC: NEGATIVE
LEUKOCYTE ESTERASE UR QL STRIP: NEGATIVE
NITRITE UR QL STRIP: NEGATIVE
PH UR STRIP: 7
PROT UR STRIP-MCNC: NEGATIVE
SP GR UR STRIP: 1.01

## 2023-12-14 PROCEDURE — 81003 URINALYSIS AUTO W/O SCOPE: CPT | Mod: QW

## 2023-12-14 PROCEDURE — 99396 PREV VISIT EST AGE 40-64: CPT | Mod: 25

## 2023-12-14 RX ORDER — PSYLLIUM HUSK 0.4 G
0.52 CAPSULE ORAL TWICE DAILY
Qty: 120 | Refills: 2 | Status: COMPLETED | COMMUNITY
Start: 2023-01-20 | End: 2023-12-14

## 2023-12-14 RX ORDER — CIPROFLOXACIN HYDROCHLORIDE 500 MG/1
500 TABLET, FILM COATED ORAL TWICE DAILY
Qty: 14 | Refills: 0 | Status: COMPLETED | COMMUNITY
Start: 2022-02-14 | End: 2023-12-14

## 2023-12-14 RX ORDER — SULFAMETHOXAZOLE AND TRIMETHOPRIM 800; 160 MG/1; MG/1
800-160 TABLET ORAL
Qty: 14 | Refills: 0 | Status: COMPLETED | COMMUNITY
Start: 2022-05-09 | End: 2023-12-14

## 2023-12-14 RX ORDER — LEVOFLOXACIN 250 MG/1
250 TABLET, FILM COATED ORAL
Qty: 3 | Refills: 0 | Status: COMPLETED | COMMUNITY
Start: 2022-02-13 | End: 2023-12-14

## 2023-12-14 RX ORDER — NITROFURANTOIN (MONOHYDRATE/MACROCRYSTALS) 25; 75 MG/1; MG/1
100 CAPSULE ORAL
Qty: 10 | Refills: 0 | Status: COMPLETED | COMMUNITY
Start: 2022-02-07 | End: 2023-12-14

## 2023-12-14 RX ORDER — CIPROFLOXACIN HYDROCHLORIDE 500 MG/1
500 TABLET, FILM COATED ORAL
Qty: 14 | Refills: 0 | Status: COMPLETED | COMMUNITY
Start: 2022-05-02 | End: 2023-12-14

## 2023-12-14 NOTE — HISTORY OF PRESENT ILLNESS
[Patient reported mammogram was abnormal] : Patient reported mammogram was abnormal [postmenopausal] : postmenopausal [N] : Patient does not use contraception [Y] : Positive pregnancy history [Mammogramdate] : 11/28/23 [TextBox_19] : had breast biopsy  benign [LMPDate] : 2019 [PGxTotal] : 1 [[facility] [Currently In Menopause] : currently in menopause [Post-Menopause, No Sxs] : post-menopausal, currently without symptoms [Menopause Age: ____] : age at menopause was [unfilled] [FreeTextEntry1] : 2019 [No] : Patient does not have concerns regarding sex [Previously active] : previously active

## 2023-12-14 NOTE — DISCUSSION/SUMMARY
[FreeTextEntry1] : followed by Dr Aman galloway breast surveillance.  drawn today ( mother ovarian cancer)

## 2023-12-20 ENCOUNTER — NON-APPOINTMENT (OUTPATIENT)
Age: 58
End: 2023-12-20

## 2023-12-26 ENCOUNTER — NON-APPOINTMENT (OUTPATIENT)
Age: 58
End: 2023-12-26

## 2023-12-27 LAB — CYTOLOGY CVX/VAG DOC THIN PREP: NORMAL

## 2024-01-04 ENCOUNTER — NON-APPOINTMENT (OUTPATIENT)
Age: 59
End: 2024-01-04

## 2024-01-19 ENCOUNTER — APPOINTMENT (OUTPATIENT)
Dept: PLASTIC SURGERY | Facility: CLINIC | Age: 59
End: 2024-01-19
Payer: COMMERCIAL

## 2024-01-19 DIAGNOSIS — D48.5 NEOPLASM OF UNCERTAIN BEHAVIOR OF SKIN: ICD-10-CM

## 2024-01-19 PROCEDURE — 11441 EXC FACE-MM B9+MARG 0.6-1 CM: CPT

## 2024-01-19 PROCEDURE — 13131 CMPLX RPR F/C/C/M/N/AX/G/H/F: CPT

## 2024-01-24 LAB — CORE LAB BIOPSY: NORMAL

## 2024-01-25 ENCOUNTER — INPATIENT (INPATIENT)
Facility: HOSPITAL | Age: 59
LOS: 3 days | Discharge: HOME | DRG: 391 | End: 2024-01-29
Attending: STUDENT IN AN ORGANIZED HEALTH CARE EDUCATION/TRAINING PROGRAM | Admitting: FAMILY MEDICINE
Payer: COMMERCIAL

## 2024-01-25 VITALS
HEART RATE: 122 BPM | WEIGHT: 139.99 LBS | TEMPERATURE: 99 F | RESPIRATION RATE: 18 BRPM | SYSTOLIC BLOOD PRESSURE: 121 MMHG | OXYGEN SATURATION: 100 % | DIASTOLIC BLOOD PRESSURE: 82 MMHG | HEIGHT: 62 IN

## 2024-01-25 DIAGNOSIS — Z98.890 OTHER SPECIFIED POSTPROCEDURAL STATES: Chronic | ICD-10-CM

## 2024-01-25 DIAGNOSIS — K52.9 NONINFECTIVE GASTROENTERITIS AND COLITIS, UNSPECIFIED: ICD-10-CM

## 2024-01-25 DIAGNOSIS — L72.3 SEBACEOUS CYST: Chronic | ICD-10-CM

## 2024-01-25 LAB
ALBUMIN SERPL ELPH-MCNC: 4.7 G/DL — SIGNIFICANT CHANGE UP (ref 3.5–5.2)
ALP SERPL-CCNC: 98 U/L — SIGNIFICANT CHANGE UP (ref 30–115)
ALT FLD-CCNC: 33 U/L — SIGNIFICANT CHANGE UP (ref 0–41)
ANION GAP SERPL CALC-SCNC: 12 MMOL/L — SIGNIFICANT CHANGE UP (ref 7–14)
APTT BLD: 29.6 SEC — SIGNIFICANT CHANGE UP (ref 27–39.2)
AST SERPL-CCNC: 24 U/L — SIGNIFICANT CHANGE UP (ref 0–41)
BASOPHILS # BLD AUTO: 0.02 K/UL — SIGNIFICANT CHANGE UP (ref 0–0.2)
BASOPHILS NFR BLD AUTO: 0.2 % — SIGNIFICANT CHANGE UP (ref 0–1)
BILIRUB SERPL-MCNC: 0.8 MG/DL — SIGNIFICANT CHANGE UP (ref 0.2–1.2)
BUN SERPL-MCNC: 17 MG/DL — SIGNIFICANT CHANGE UP (ref 10–20)
C DIFF BY PCR RESULT: SIGNIFICANT CHANGE UP
CALCIUM SERPL-MCNC: 10.1 MG/DL — SIGNIFICANT CHANGE UP (ref 8.4–10.5)
CHLORIDE SERPL-SCNC: 102 MMOL/L — SIGNIFICANT CHANGE UP (ref 98–110)
CO2 SERPL-SCNC: 28 MMOL/L — SIGNIFICANT CHANGE UP (ref 17–32)
CREAT SERPL-MCNC: 0.6 MG/DL — LOW (ref 0.7–1.5)
EGFR: 103 ML/MIN/1.73M2 — SIGNIFICANT CHANGE UP
EOSINOPHIL # BLD AUTO: 0.02 K/UL — SIGNIFICANT CHANGE UP (ref 0–0.7)
EOSINOPHIL NFR BLD AUTO: 0.2 % — SIGNIFICANT CHANGE UP (ref 0–8)
GAS PNL BLDV: SIGNIFICANT CHANGE UP
GLUCOSE SERPL-MCNC: 122 MG/DL — HIGH (ref 70–99)
HCT VFR BLD CALC: 35 % — LOW (ref 37–47)
HCT VFR BLD CALC: 45.9 % — SIGNIFICANT CHANGE UP (ref 37–47)
HGB BLD-MCNC: 12.4 G/DL — SIGNIFICANT CHANGE UP (ref 12–16)
HGB BLD-MCNC: 15.7 G/DL — SIGNIFICANT CHANGE UP (ref 12–16)
IMM GRANULOCYTES NFR BLD AUTO: 0.2 % — SIGNIFICANT CHANGE UP (ref 0.1–0.3)
INR BLD: 0.97 RATIO — SIGNIFICANT CHANGE UP (ref 0.65–1.3)
LIDOCAIN IGE QN: 16 U/L — SIGNIFICANT CHANGE UP (ref 7–60)
LYMPHOCYTES # BLD AUTO: 0.6 K/UL — LOW (ref 1.2–3.4)
LYMPHOCYTES # BLD AUTO: 4.6 % — LOW (ref 20.5–51.1)
MAGNESIUM SERPL-MCNC: 1.6 MG/DL — LOW (ref 1.8–2.4)
MCHC RBC-ENTMCNC: 31.9 PG — HIGH (ref 27–31)
MCHC RBC-ENTMCNC: 32.5 PG — HIGH (ref 27–31)
MCHC RBC-ENTMCNC: 34.2 G/DL — SIGNIFICANT CHANGE UP (ref 32–37)
MCHC RBC-ENTMCNC: 35.4 G/DL — SIGNIFICANT CHANGE UP (ref 32–37)
MCV RBC AUTO: 91.6 FL — SIGNIFICANT CHANGE UP (ref 81–99)
MCV RBC AUTO: 93.3 FL — SIGNIFICANT CHANGE UP (ref 81–99)
MONOCYTES # BLD AUTO: 0.64 K/UL — HIGH (ref 0.1–0.6)
MONOCYTES NFR BLD AUTO: 5 % — SIGNIFICANT CHANGE UP (ref 1.7–9.3)
NEUTROPHILS # BLD AUTO: 11.61 K/UL — HIGH (ref 1.4–6.5)
NEUTROPHILS NFR BLD AUTO: 89.8 % — HIGH (ref 42.2–75.2)
NRBC # BLD: 0 /100 WBCS — SIGNIFICANT CHANGE UP (ref 0–0)
NRBC # BLD: 0 /100 WBCS — SIGNIFICANT CHANGE UP (ref 0–0)
PLATELET # BLD AUTO: 179 K/UL — SIGNIFICANT CHANGE UP (ref 130–400)
PLATELET # BLD AUTO: 233 K/UL — SIGNIFICANT CHANGE UP (ref 130–400)
PMV BLD: 11.6 FL — HIGH (ref 7.4–10.4)
PMV BLD: 11.8 FL — HIGH (ref 7.4–10.4)
POTASSIUM SERPL-MCNC: 4.4 MMOL/L — SIGNIFICANT CHANGE UP (ref 3.5–5)
POTASSIUM SERPL-SCNC: 4.4 MMOL/L — SIGNIFICANT CHANGE UP (ref 3.5–5)
PROT SERPL-MCNC: 7.2 G/DL — SIGNIFICANT CHANGE UP (ref 6–8)
PROTHROM AB SERPL-ACNC: 11 SEC — SIGNIFICANT CHANGE UP (ref 9.95–12.87)
RBC # BLD: 3.82 M/UL — LOW (ref 4.2–5.4)
RBC # BLD: 4.92 M/UL — SIGNIFICANT CHANGE UP (ref 4.2–5.4)
RBC # FLD: 12.2 % — SIGNIFICANT CHANGE UP (ref 11.5–14.5)
RBC # FLD: 12.4 % — SIGNIFICANT CHANGE UP (ref 11.5–14.5)
SODIUM SERPL-SCNC: 142 MMOL/L — SIGNIFICANT CHANGE UP (ref 135–146)
WBC # BLD: 11.44 K/UL — HIGH (ref 4.8–10.8)
WBC # BLD: 12.92 K/UL — HIGH (ref 4.8–10.8)
WBC # FLD AUTO: 11.44 K/UL — HIGH (ref 4.8–10.8)
WBC # FLD AUTO: 12.92 K/UL — HIGH (ref 4.8–10.8)

## 2024-01-25 PROCEDURE — 87086 URINE CULTURE/COLONY COUNT: CPT

## 2024-01-25 PROCEDURE — 81003 URINALYSIS AUTO W/O SCOPE: CPT

## 2024-01-25 PROCEDURE — 99223 1ST HOSP IP/OBS HIGH 75: CPT

## 2024-01-25 PROCEDURE — 80053 COMPREHEN METABOLIC PANEL: CPT

## 2024-01-25 PROCEDURE — 74177 CT ABD & PELVIS W/CONTRAST: CPT | Mod: 26,MA

## 2024-01-25 PROCEDURE — 87045 FECES CULTURE AEROBIC BACT: CPT

## 2024-01-25 PROCEDURE — 87046 STOOL CULTR AEROBIC BACT EA: CPT

## 2024-01-25 PROCEDURE — C9113: CPT

## 2024-01-25 PROCEDURE — 87507 IADNA-DNA/RNA PROBE TQ 12-25: CPT

## 2024-01-25 PROCEDURE — 85025 COMPLETE CBC W/AUTO DIFF WBC: CPT

## 2024-01-25 PROCEDURE — 83735 ASSAY OF MAGNESIUM: CPT

## 2024-01-25 PROCEDURE — 93005 ELECTROCARDIOGRAM TRACING: CPT

## 2024-01-25 PROCEDURE — 84100 ASSAY OF PHOSPHORUS: CPT

## 2024-01-25 PROCEDURE — 93010 ELECTROCARDIOGRAM REPORT: CPT

## 2024-01-25 PROCEDURE — 74018 RADEX ABDOMEN 1 VIEW: CPT

## 2024-01-25 PROCEDURE — 85027 COMPLETE CBC AUTOMATED: CPT

## 2024-01-25 PROCEDURE — 99285 EMERGENCY DEPT VISIT HI MDM: CPT

## 2024-01-25 PROCEDURE — 36415 COLL VENOUS BLD VENIPUNCTURE: CPT

## 2024-01-25 RX ORDER — PANTOPRAZOLE SODIUM 20 MG/1
40 TABLET, DELAYED RELEASE ORAL DAILY
Refills: 0 | Status: DISCONTINUED | OUTPATIENT
Start: 2024-01-25 | End: 2024-01-29

## 2024-01-25 RX ORDER — SODIUM CHLORIDE 9 MG/ML
1000 INJECTION INTRAMUSCULAR; INTRAVENOUS; SUBCUTANEOUS ONCE
Refills: 0 | Status: COMPLETED | OUTPATIENT
Start: 2024-01-25 | End: 2024-01-25

## 2024-01-25 RX ORDER — ACETAMINOPHEN 500 MG
975 TABLET ORAL ONCE
Refills: 0 | Status: COMPLETED | OUTPATIENT
Start: 2024-01-25 | End: 2024-01-25

## 2024-01-25 RX ORDER — MORPHINE SULFATE 50 MG/1
4 CAPSULE, EXTENDED RELEASE ORAL ONCE
Refills: 0 | Status: DISCONTINUED | OUTPATIENT
Start: 2024-01-25 | End: 2024-01-25

## 2024-01-25 RX ORDER — METRONIDAZOLE 500 MG
500 TABLET ORAL EVERY 8 HOURS
Refills: 0 | Status: DISCONTINUED | OUTPATIENT
Start: 2024-01-25 | End: 2024-01-28

## 2024-01-25 RX ORDER — METRONIDAZOLE 500 MG
500 TABLET ORAL ONCE
Refills: 0 | Status: COMPLETED | OUTPATIENT
Start: 2024-01-25 | End: 2024-01-25

## 2024-01-25 RX ORDER — ACETAMINOPHEN 500 MG
650 TABLET ORAL EVERY 6 HOURS
Refills: 0 | Status: DISCONTINUED | OUTPATIENT
Start: 2024-01-25 | End: 2024-01-29

## 2024-01-25 RX ORDER — CIPROFLOXACIN LACTATE 400MG/40ML
400 VIAL (ML) INTRAVENOUS EVERY 12 HOURS
Refills: 0 | Status: DISCONTINUED | OUTPATIENT
Start: 2024-01-25 | End: 2024-01-28

## 2024-01-25 RX ORDER — ONDANSETRON 8 MG/1
4 TABLET, FILM COATED ORAL EVERY 6 HOURS
Refills: 0 | Status: DISCONTINUED | OUTPATIENT
Start: 2024-01-25 | End: 2024-01-29

## 2024-01-25 RX ORDER — LACTOBACILLUS ACIDOPHILUS 100MM CELL
1 CAPSULE ORAL
Refills: 0 | Status: DISCONTINUED | OUTPATIENT
Start: 2024-01-25 | End: 2024-01-29

## 2024-01-25 RX ORDER — CIPROFLOXACIN LACTATE 400MG/40ML
400 VIAL (ML) INTRAVENOUS ONCE
Refills: 0 | Status: COMPLETED | OUTPATIENT
Start: 2024-01-25 | End: 2024-01-25

## 2024-01-25 RX ORDER — FAMOTIDINE 10 MG/ML
20 INJECTION INTRAVENOUS ONCE
Refills: 0 | Status: COMPLETED | OUTPATIENT
Start: 2024-01-25 | End: 2024-01-25

## 2024-01-25 RX ORDER — SODIUM CHLORIDE 9 MG/ML
1000 INJECTION INTRAMUSCULAR; INTRAVENOUS; SUBCUTANEOUS
Refills: 0 | Status: DISCONTINUED | OUTPATIENT
Start: 2024-01-25 | End: 2024-01-28

## 2024-01-25 RX ORDER — MAGNESIUM SULFATE 500 MG/ML
2 VIAL (ML) INJECTION ONCE
Refills: 0 | Status: COMPLETED | OUTPATIENT
Start: 2024-01-25 | End: 2024-01-25

## 2024-01-25 RX ORDER — L.ACIDOPH/B.ANIMALIS/B.LONGUM 15B CELL
0 CAPSULE ORAL
Qty: 0 | Refills: 0 | DISCHARGE

## 2024-01-25 RX ADMIN — Medication 100 MILLIGRAM(S): at 22:36

## 2024-01-25 RX ADMIN — Medication 975 MILLIGRAM(S): at 20:32

## 2024-01-25 RX ADMIN — MORPHINE SULFATE 4 MILLIGRAM(S): 50 CAPSULE, EXTENDED RELEASE ORAL at 19:55

## 2024-01-25 RX ADMIN — Medication 25 GRAM(S): at 18:40

## 2024-01-25 RX ADMIN — Medication 200 MILLIGRAM(S): at 19:56

## 2024-01-25 RX ADMIN — FAMOTIDINE 100 MILLIGRAM(S): 10 INJECTION INTRAVENOUS at 16:49

## 2024-01-25 RX ADMIN — SODIUM CHLORIDE 2000 MILLILITER(S): 9 INJECTION INTRAMUSCULAR; INTRAVENOUS; SUBCUTANEOUS at 16:48

## 2024-01-25 RX ADMIN — SODIUM CHLORIDE 70 MILLILITER(S): 9 INJECTION INTRAMUSCULAR; INTRAVENOUS; SUBCUTANEOUS at 22:35

## 2024-01-25 RX ADMIN — SODIUM CHLORIDE 1000 MILLILITER(S): 9 INJECTION INTRAMUSCULAR; INTRAVENOUS; SUBCUTANEOUS at 20:32

## 2024-01-25 NOTE — ED PROVIDER NOTE - ATTENDING APP SHARED VISIT CONTRIBUTION OF CARE
57 yo F PMHx noted presents For evaluation of bloody stool since 3 AM.  Patient states she had multiple episodes associated with abdominal cramping.  Patient states that she also has nausea but no vomiting, no fever or chills, patient has history of C. difficile last year.  Patient tried to contact her gastroenterologist and was told by the office to come to the ED.  On exam patient in NAD, AAOx3, OP clear, MMM and pink, abdomen is soft, positive bowel sounds, nontender nondistended,

## 2024-01-25 NOTE — PATIENT PROFILE ADULT - FUNCTIONAL ASSESSMENT - DAILY ACTIVITY SECTION LABEL
Likely [facility] would be best as I have no openings tomorrow, am out of the office on Friday and then it is a holiday weekend.     .

## 2024-01-25 NOTE — ED PROVIDER NOTE - CLINICAL SUMMARY MEDICAL DECISION MAKING FREE TEXT BOX
Patient treated with IV fluids as well as pain medication.  Labs were obtained.  Patient does have elevated white blood cell count.  Hemoglobin is good.  Patient did have episode of bloody stool while in the ED.  We were able to send for sample for rule out C. difficile.  C. difficile PCR is negative.  CT scan reveals colitis.  Antibiotics initiated.  Results were discussed with patient.  Patient developed fever while in the ED.  Will admit at this time.

## 2024-01-25 NOTE — H&P ADULT - NSHPPHYSICALEXAM_GEN_ALL_CORE
Vital Signs Last 24 Hrs  T(C): 37.7 (25 Jan 2024 19:55), Max: 37.7 (25 Jan 2024 19:55)  T(F): 99.9 (25 Jan 2024 19:55), Max: 99.9 (25 Jan 2024 19:55)  HR: 107 (25 Jan 2024 21:12) (107 - 122)  BP: 123/75 (25 Jan 2024 19:55) (121/82 - 123/75)  BP(mean): --  RR: 18 (25 Jan 2024 19:55) (18 - 18)  SpO2: 96% (25 Jan 2024 19:55) (96% - 100%)    Parameters below as of 25 Jan 2024 19:55  Patient On (Oxygen Delivery Method): room air      PHYSICAL EXAM-  GENERAL: NAD,   HEAD:  Atraumatic, Normocephalic  EYES: EOMI, PERRLA, conjunctiva and sclera clear  NECK: Supple, No JVD, Normal thyroid  NERVOUS SYSTEM:  Alert & Oriented X3, Motor Strength 5/5 B/L upper and lower extremities; DTRs 2+ intact and symmetric  CHEST/LUNG: Clear to percussion bilaterally; No rales, rhonchi, wheezing, or rubs  HEART: Regular rate and rhythm; No murmurs, rubs, or gallops  ABDOMEN: Soft, Nontender, Nondistended; Bowel sounds present  EXTREMITIES:  2+ Peripheral Pulses, No clubbing, cyanosis, or edema  SKIN: No rashes or lesions

## 2024-01-25 NOTE — ED PROVIDER NOTE - PROGRESS NOTE DETAILS
Patient is admitted for colitis, and intractable pain. Pt is aware of the plan and agrees. Pt has been endorsed to hospitalist.

## 2024-01-25 NOTE — ED ADULT NURSE NOTE - NSFALLUNIVINTERV_ED_ALL_ED
Bed/Stretcher in lowest position, wheels locked, appropriate side rails in place/Call bell, personal items and telephone in reach/Instruct patient to call for assistance before getting out of bed/chair/stretcher/Non-slip footwear applied when patient is off stretcher/[place] to call system/Physically safe environment - no spills, clutter or unnecessary equipment/Purposeful proactive rounding/Room/bathroom lighting operational, light cord in reach

## 2024-01-25 NOTE — ED PROVIDER NOTE - OBJECTIVE STATEMENT
58-year-old female with past medical history of diverticulosis who presents to the ED with bloody stool.  Reports that she has been noticing diarrhea and bloody stool since 3:00 AM this morning, so came to the ED for evaluation.  Also endorses generalized abdominal pain along with nausea.  Denies fever, shortness of breath, chest pain, vomiting, hematuria, dysuria, and melena.

## 2024-01-25 NOTE — H&P ADULT - HISTORY OF PRESENT ILLNESS
Patient is 58-year-old female with past medical history of Cdiff, and diverticulosis who presents to the ED with bloody stool.  Reports that she has been noticing diarrhea and bloody stool since 3:00 AM this morning, so came to the ED for evaluation.  Also endorses generalized abdominal pain along with nausea.  Denies fever, shortness of breath, chest pain, vomiting, hematuria, dysuria, and melena.  currently no bleeding

## 2024-01-25 NOTE — ED PROVIDER NOTE - PHYSICAL EXAMINATION
CONSTITUTIONAL: in no apparent distress.   ENT: Hearing is intact with good acuity to spoken voice.  Patient is speaking clearly, not muffled and airway is intact.   RESPIRATORY: No signs of respiratory distress. Lung sounds are clear in all lobes bilaterally without rales, rhonchi, or wheezes.  CARDIOVASCULAR: Regular rate and rhythm.   GI: Abdomen is soft, non-tender, and without distention. Bowel sounds are present and normoactive in all four quadrants. No masses are noted.   Rectal: Chaperone: Dr. Valdivia. Normal rectal sphincter tone. No external masses or lesions. No bright red blood or melena noticed.  BACK: No evidence of trauma or deformity. No CVA tenderness bilaterally. Normal ROM.   NEURO: A & O x 3. Normal speech. No focal deficit.  PSYCHOLOGICAL: Appropriate mood and affect. Good judgement and insight.

## 2024-01-25 NOTE — H&P ADULT - ASSESSMENT
Patient is 57 yo female with hx of Diverticulosis, and C-diff presenting with       #Diarrhea   #Blood per stool  -CT scan shows colitis   -Will start patient on Cipro and flagyl  -C-diff negative  -obtain GI PCR, and stool culture  -Monitor H/H  -active type and screen  -GI consult       #GERD  -PPI      #Progress Note Handoff  Pending (specify):  as above   Family discussion:  plan of care was discussed with patient  in details.  all questions were answered.  seems to understand, and in agreement  Disposition:  home

## 2024-01-25 NOTE — ED PROVIDER NOTE - CARE PLAN
Principal Discharge DX:	Colitis  Secondary Diagnosis:	Intractable pain  Secondary Diagnosis:	Hematochezia   1

## 2024-01-26 LAB
ALBUMIN SERPL ELPH-MCNC: 3.9 G/DL — SIGNIFICANT CHANGE UP (ref 3.5–5.2)
ALP SERPL-CCNC: 82 U/L — SIGNIFICANT CHANGE UP (ref 30–115)
ALT FLD-CCNC: 20 U/L — SIGNIFICANT CHANGE UP (ref 0–41)
ANION GAP SERPL CALC-SCNC: 10 MMOL/L — SIGNIFICANT CHANGE UP (ref 7–14)
APPEARANCE UR: CLEAR — SIGNIFICANT CHANGE UP
AST SERPL-CCNC: 17 U/L — SIGNIFICANT CHANGE UP (ref 0–41)
BILIRUB SERPL-MCNC: 1.3 MG/DL — HIGH (ref 0.2–1.2)
BILIRUB UR-MCNC: NEGATIVE — SIGNIFICANT CHANGE UP
BUN SERPL-MCNC: 12 MG/DL — SIGNIFICANT CHANGE UP (ref 10–20)
CALCIUM SERPL-MCNC: 8.6 MG/DL — SIGNIFICANT CHANGE UP (ref 8.4–10.5)
CHLORIDE SERPL-SCNC: 106 MMOL/L — SIGNIFICANT CHANGE UP (ref 98–110)
CO2 SERPL-SCNC: 26 MMOL/L — SIGNIFICANT CHANGE UP (ref 17–32)
COLOR SPEC: YELLOW — SIGNIFICANT CHANGE UP
CREAT SERPL-MCNC: 0.5 MG/DL — LOW (ref 0.7–1.5)
DIFF PNL FLD: NEGATIVE — SIGNIFICANT CHANGE UP
EGFR: 108 ML/MIN/1.73M2 — SIGNIFICANT CHANGE UP
GLUCOSE SERPL-MCNC: 91 MG/DL — SIGNIFICANT CHANGE UP (ref 70–99)
GLUCOSE UR QL: NEGATIVE MG/DL — SIGNIFICANT CHANGE UP
HCT VFR BLD CALC: 36.5 % — LOW (ref 37–47)
HCT VFR BLD CALC: 36.9 % — LOW (ref 37–47)
HGB BLD-MCNC: 12.2 G/DL — SIGNIFICANT CHANGE UP (ref 12–16)
HGB BLD-MCNC: 12.5 G/DL — SIGNIFICANT CHANGE UP (ref 12–16)
KETONES UR-MCNC: ABNORMAL MG/DL
LEUKOCYTE ESTERASE UR-ACNC: NEGATIVE — SIGNIFICANT CHANGE UP
MCHC RBC-ENTMCNC: 31.9 PG — HIGH (ref 27–31)
MCHC RBC-ENTMCNC: 32.3 PG — HIGH (ref 27–31)
MCHC RBC-ENTMCNC: 33.1 G/DL — SIGNIFICANT CHANGE UP (ref 32–37)
MCHC RBC-ENTMCNC: 34.2 G/DL — SIGNIFICANT CHANGE UP (ref 32–37)
MCV RBC AUTO: 94.3 FL — SIGNIFICANT CHANGE UP (ref 81–99)
MCV RBC AUTO: 96.3 FL — SIGNIFICANT CHANGE UP (ref 81–99)
NITRITE UR-MCNC: NEGATIVE — SIGNIFICANT CHANGE UP
NRBC # BLD: 0 /100 WBCS — SIGNIFICANT CHANGE UP (ref 0–0)
NRBC # BLD: 0 /100 WBCS — SIGNIFICANT CHANGE UP (ref 0–0)
PH UR: 6 — SIGNIFICANT CHANGE UP (ref 5–8)
PLATELET # BLD AUTO: 169 K/UL — SIGNIFICANT CHANGE UP (ref 130–400)
PLATELET # BLD AUTO: 183 K/UL — SIGNIFICANT CHANGE UP (ref 130–400)
PMV BLD: 10.9 FL — HIGH (ref 7.4–10.4)
PMV BLD: 12.1 FL — HIGH (ref 7.4–10.4)
POTASSIUM SERPL-MCNC: 4.1 MMOL/L — SIGNIFICANT CHANGE UP (ref 3.5–5)
POTASSIUM SERPL-SCNC: 4.1 MMOL/L — SIGNIFICANT CHANGE UP (ref 3.5–5)
PROT SERPL-MCNC: 5.7 G/DL — LOW (ref 6–8)
PROT UR-MCNC: NEGATIVE MG/DL — SIGNIFICANT CHANGE UP
RBC # BLD: 3.83 M/UL — LOW (ref 4.2–5.4)
RBC # BLD: 3.87 M/UL — LOW (ref 4.2–5.4)
RBC # FLD: 12.3 % — SIGNIFICANT CHANGE UP (ref 11.5–14.5)
RBC # FLD: 12.6 % — SIGNIFICANT CHANGE UP (ref 11.5–14.5)
SODIUM SERPL-SCNC: 142 MMOL/L — SIGNIFICANT CHANGE UP (ref 135–146)
SP GR SPEC: >1.03 — HIGH (ref 1–1.03)
UROBILINOGEN FLD QL: 0.2 MG/DL — SIGNIFICANT CHANGE UP (ref 0.2–1)
WBC # BLD: 10.93 K/UL — HIGH (ref 4.8–10.8)
WBC # BLD: 9.44 K/UL — SIGNIFICANT CHANGE UP (ref 4.8–10.8)
WBC # FLD AUTO: 10.93 K/UL — HIGH (ref 4.8–10.8)
WBC # FLD AUTO: 9.44 K/UL — SIGNIFICANT CHANGE UP (ref 4.8–10.8)

## 2024-01-26 PROCEDURE — 99232 SBSQ HOSP IP/OBS MODERATE 35: CPT

## 2024-01-26 PROCEDURE — 99223 1ST HOSP IP/OBS HIGH 75: CPT

## 2024-01-26 RX ORDER — SIMETHICONE 80 MG/1
80 TABLET, CHEWABLE ORAL THREE TIMES A DAY
Refills: 0 | Status: DISCONTINUED | OUTPATIENT
Start: 2024-01-26 | End: 2024-01-29

## 2024-01-26 RX ORDER — MORPHINE SULFATE 50 MG/1
2 CAPSULE, EXTENDED RELEASE ORAL EVERY 4 HOURS
Refills: 0 | Status: DISCONTINUED | OUTPATIENT
Start: 2024-01-26 | End: 2024-01-28

## 2024-01-26 RX ADMIN — Medication 100 MILLIGRAM(S): at 21:11

## 2024-01-26 RX ADMIN — PANTOPRAZOLE SODIUM 40 MILLIGRAM(S): 20 TABLET, DELAYED RELEASE ORAL at 11:47

## 2024-01-26 RX ADMIN — Medication 650 MILLIGRAM(S): at 06:57

## 2024-01-26 RX ADMIN — MORPHINE SULFATE 2 MILLIGRAM(S): 50 CAPSULE, EXTENDED RELEASE ORAL at 16:00

## 2024-01-26 RX ADMIN — Medication 650 MILLIGRAM(S): at 09:15

## 2024-01-26 RX ADMIN — Medication 1 TABLET(S): at 17:16

## 2024-01-26 RX ADMIN — SIMETHICONE 80 MILLIGRAM(S): 80 TABLET, CHEWABLE ORAL at 23:48

## 2024-01-26 RX ADMIN — Medication 100 MILLIGRAM(S): at 16:00

## 2024-01-26 RX ADMIN — Medication 200 MILLIGRAM(S): at 05:37

## 2024-01-26 RX ADMIN — Medication 650 MILLIGRAM(S): at 00:42

## 2024-01-26 RX ADMIN — SIMETHICONE 80 MILLIGRAM(S): 80 TABLET, CHEWABLE ORAL at 18:07

## 2024-01-26 RX ADMIN — Medication 1 TABLET(S): at 11:47

## 2024-01-26 RX ADMIN — MORPHINE SULFATE 2 MILLIGRAM(S): 50 CAPSULE, EXTENDED RELEASE ORAL at 16:15

## 2024-01-26 RX ADMIN — Medication 200 MILLIGRAM(S): at 17:16

## 2024-01-26 RX ADMIN — Medication 100 MILLIGRAM(S): at 07:50

## 2024-01-26 RX ADMIN — MORPHINE SULFATE 2 MILLIGRAM(S): 50 CAPSULE, EXTENDED RELEASE ORAL at 21:14

## 2024-01-26 RX ADMIN — Medication 650 MILLIGRAM(S): at 08:45

## 2024-01-26 NOTE — CONSULT NOTE ADULT - NS ATTEND AMEND GEN_ALL_CORE FT
Pt presents with abdominal pain and bloody diarrhea. Prior colonoscopy noted in 5/2023. Recommend await remainder of stool studies. Pending course and stool tests will determine timing for repeat colonoscopy.

## 2024-01-26 NOTE — PROGRESS NOTE ADULT - SUBJECTIVE AND OBJECTIVE BOX
MEDICINE ATTENDING PROGRESS NOTE  Patient is 59 yo female with hx of Diverticulosis, and C-diff presenting with bloody diarrhea. Admitted for further management.    Interval/Overnight Events      ROS  General: Denies fevers, chills, nightsweats, weight loss  HEENT: Denies headache, rhinorrhea, sore throat, eye pain  CV: Denies CP, palpitations  PULM: Denies SOB, cough  GI: Denies abdominal pain, diarrhea  : Denies dysuria, hematuria  MSK: Denies arthralgias  SKIN: Denies rash   NEURO: Denies paresthesias, weakness  PSYCH: Denies depression    MEDICATIONS  (STANDING):  ciprofloxacin   IVPB 400 milliGRAM(s) IV Intermittent every 12 hours  lactobacillus acidophilus 1 Tablet(s) Oral three times a day with meals  metroNIDAZOLE  IVPB 500 milliGRAM(s) IV Intermittent every 8 hours  pantoprazole  Injectable 40 milliGRAM(s) IV Push daily  sodium chloride 0.9%. 1000 milliLiter(s) (70 mL/Hr) IV Continuous <Continuous>    MEDICATIONS  (PRN):  acetaminophen     Tablet .. 650 milliGRAM(s) Oral every 6 hours PRN Temp greater or equal to 38C (100.4F), Mild Pain (1 - 3)  ondansetron Injectable 4 milliGRAM(s) IV Push every 6 hours PRN Nausea    ANTIBIOTICS:  ciprofloxacin   IVPB 400 milliGRAM(s) IV Intermittent every 12 hours  metroNIDAZOLE  IVPB 500 milliGRAM(s) IV Intermittent every 8 hours      VITALS:  T(F): 96.8, Max: 99.9 (01-25-24 @ 19:55)  HR: 74  BP: 96/58  RR: 18Vital Signs Last 24 Hrs  T(C): 36 (26 Jan 2024 04:40), Max: 37.7 (25 Jan 2024 19:55)  T(F): 96.8 (26 Jan 2024 04:40), Max: 99.9 (25 Jan 2024 19:55)  HR: 74 (26 Jan 2024 04:40) (74 - 122)  BP: 96/58 (26 Jan 2024 04:40) (96/58 - 123/75)  BP(mean): --  RR: 18 (26 Jan 2024 04:40) (18 - 18)  SpO2: 97% (26 Jan 2024 04:40) (96% - 100%)    Parameters below as of 25 Jan 2024 19:55  Patient On (Oxygen Delivery Method): room air     I&O's Summary    25 Jan 2024 07:01  -  26 Jan 2024 07:00  --------------------------------------------------------  IN: 0 mL / OUT: 200 mL / NET: -200 mL      PHYSICAL EXAM:  Gen: NAD, resting in bed  HEENT: Normocephalic, atraumatic  Neck: supple, no lymphadenopathy  CV: Regular rate & regular rhythm  Lungs: CTABL no wheeze  Abdomen: Soft, NTND+ BS present  Ext: Warm, well perfused no CCE  Neuro: non focal, awake, CN II-XII intact   Skin: no rash, no erythema  Psych: no SI, HI, Hallucination     LABS:                        12.2   9.44  )-----------( 169      ( 26 Jan 2024 06:43 )             36.9     01-26    142  |  106  |  12  ----------------------------<  91  4.1   |  26  |  0.5<L>    Ca    8.6      26 Jan 2024 06:43  Mg     1.6     01-25    TPro  5.7<L>  /  Alb  3.9  /  TBili  1.3<H>  /  DBili  x   /  AST  17  /  ALT  20  /  AlkPhos  82  01-26      LIVER FUNCTIONS - ( 26 Jan 2024 06:43 )  Alb: 3.9 g/dL / Pro: 5.7 g/dL / ALK PHOS: 82 U/L / ALT: 20 U/L / AST: 17 U/L / GGT: x           PT/INR - ( 25 Jan 2024 16:38 )   PT: 11.00 sec;   INR: 0.97 ratio         PTT - ( 25 Jan 2024 16:38 )  PTT:29.6 sec    MICROBIOLOGY:  Urinalysis Basic - ( 26 Jan 2024 06:43 )    Color: x / Appearance: x / SG: x / pH: x  Gluc: 91 mg/dL / Ketone: x  / Bili: x / Urobili: x   Blood: x / Protein: x / Nitrite: x   Leuk Esterase: x / RBC: x / WBC x   Sq Epi: x / Non Sq Epi: x / Bacteria: x          Blood Gas Venous - Lactate: 0.8 mmol/L (01-25-24 @ 20:29)        IMAGING:  CXR      CT  CT Abdomen and Pelvis w/ IV Cont:   ACC: [id number] EXAM:  CT ABDOMEN AND PELVIS IC   ORDERED BY: IBRAHIMA ALEXIS DATE:  01/25/2024          INTERPRETATION:  CLINICAL STATEMENT: Abdominal pain.    TECHNIQUE: Contiguous axial CT images were obtained of the abdomen and   pelvisfollowing administration of intravenous contrast.  Oral contrast   was not administered. Reformatted images in the coronal and sagittal   planes were acquired.    COMPARISON CT: 2/13/2023    FINDINGS:    LOWER CHEST: Bibasilar subsegmental atelectasis.    HEPATOBILIARY: Unremarkable.    SPLEEN: Within normal limits.    ADRENALS: Within normal limits.    PANCREAS: Within normal limits.    KIDNEYS: Symmetric renal enhancement. No hydronephrosis. Subcentimeter   hypodensity too small to further characterize    ABDOMINOPELVIC NODES: No enlarged abdominal or pelvic lymph nodes.    PELVIC ORGANS: Underdistended urinary bladder, limiting evaluation.    PERITONEUM/MESENTERY/BOWEL: No bowel obstruction, ascites or free   intraperitoneal air. The appendix is unremarkable. Colonic   diverticulosis. Wall thickening and adjacent inflammatory change   involving the descending and sigmoid colon.    BONES/SOFT TISSUES: No acute osseous abnormality.Atherosclerotic vascular   calcifications.      IMPRESSION:  Wall thickening and adjacent inflammatory change involving the descending   and sigmoid colon compatible with colitis    --- End of Report ---            CLARA SERRANO MD; Attending Radiologist  This document has been electronically signed. Jan 25 2024  7:21PM (01-25-24 @ 18:09)    CARDIOLOGY TESTING  QRS axis to [] ° and NSR at a rate of [] BPM. There was no atrial enlargement. There was no ventricular hypertrophy. There were no ST-T changes and all intervals were normal.    12 Lead ECG:   Ventricular Rate 106 BPM    Atrial Rate 106 BPM    P-R Interval 150 ms    QRS Duration 86 ms    Q-T Interval 342 ms    QTC Calculation(Bazett) 454 ms    P Axis 51 degrees    R Axis 3 degrees    T Axis 23 degrees    Diagnosis Line Sinus tachycardia  Otherwise normal ECG    Confirmed by EMERSON MODI MD ([id number]) on 1/26/2024 7:05:10 AM (01-25-24 @ 21:03)      ASSESSMENT/PLAN:  Patient is 59 yo female with hx of Diverticulosis, and C-diff presenting with bloody diarrhea. Admitted for further management.    #Diarrhea   #Blood per stool  -CT scan shows colitis   -Will start patient on Cipro and flagyl  -C-diff negative  -obtain GI PCR, and stool culture  -Monitor H/H  -active type and screen  -GI consult       #GERD  -PPI      #Supportive Management:  Dispo:   DVT Ppx: GI Ppx: pantoprazole  Injectable 40 milliGRAM(s) IV Push daily  Diet:  Diet, Clear Liquid (01-26-24 @ 08:36) [Active]      Total time spent to complete patient's bedside assessment, review medical chart, discuss medical plan of care with covering medical team was more than 45 minutes with >50% of time spent face to face with patient, discussion with patient/family and/or coordination of care    Housestaff's notes reviewed. When there is confusion regarding the content or information provided in the notes of a housestaff (resident, medical student, physician assistant, or nurse practioner) and the attending physician notes, the attending physician's note takes precedence and supersedes the other notes.    Jose Manuel Viveros MD/Mary  Attending Physician MEDICINE ATTENDING PROGRESS NOTE  Patient is 59 yo female with hx of Diverticulosis, and C-diff presenting with bloody diarrhea. Admitted for further management.    Interval/Overnight Events  - No acute complaints  - Had only 2 BM earlier today; no bloody stool  - BP soft but patient is asymptomatic  - on IVF and IV Abx  - C. Diff neg  - GI PCR pending  - GI eval pending    ROS  General: Denies fevers, chills, nightsweats, weight loss  HEENT: Denies headache, rhinorrhea, sore throat, eye pain  CV: Denies CP, palpitations  PULM: Denies SOB, cough  GI: Denies abdominal pain, diarrhea  : Denies dysuria, hematuria  MSK: Denies arthralgias  SKIN: Denies rash   NEURO: Denies paresthesias, weakness  PSYCH: Denies depression    MEDICATIONS  (STANDING):  ciprofloxacin   IVPB 400 milliGRAM(s) IV Intermittent every 12 hours  lactobacillus acidophilus 1 Tablet(s) Oral three times a day with meals  metroNIDAZOLE  IVPB 500 milliGRAM(s) IV Intermittent every 8 hours  pantoprazole  Injectable 40 milliGRAM(s) IV Push daily  sodium chloride 0.9%. 1000 milliLiter(s) (70 mL/Hr) IV Continuous <Continuous>    MEDICATIONS  (PRN):  acetaminophen     Tablet .. 650 milliGRAM(s) Oral every 6 hours PRN Temp greater or equal to 38C (100.4F), Mild Pain (1 - 3)  ondansetron Injectable 4 milliGRAM(s) IV Push every 6 hours PRN Nausea    ANTIBIOTICS:  ciprofloxacin   IVPB 400 milliGRAM(s) IV Intermittent every 12 hours  metroNIDAZOLE  IVPB 500 milliGRAM(s) IV Intermittent every 8 hours      VITALS:  T(F): 96.8, Max: 99.9 (01-25-24 @ 19:55)  HR: 74  BP: 96/58  RR: 18Vital Signs Last 24 Hrs  T(C): 36 (26 Jan 2024 04:40), Max: 37.7 (25 Jan 2024 19:55)  T(F): 96.8 (26 Jan 2024 04:40), Max: 99.9 (25 Jan 2024 19:55)  HR: 74 (26 Jan 2024 04:40) (74 - 122)  BP: 96/58 (26 Jan 2024 04:40) (96/58 - 123/75)  BP(mean): --  RR: 18 (26 Jan 2024 04:40) (18 - 18)  SpO2: 97% (26 Jan 2024 04:40) (96% - 100%)    Parameters below as of 25 Jan 2024 19:55  Patient On (Oxygen Delivery Method): room air     I&O's Summary    25 Jan 2024 07:01  -  26 Jan 2024 07:00  --------------------------------------------------------  IN: 0 mL / OUT: 200 mL / NET: -200 mL      PHYSICAL EXAM:  Gen: NAD, resting in bed  HEENT: Normocephalic, atraumatic  Neck: supple, no lymphadenopathy  CV: Regular rate & regular rhythm  Lungs: CTABL no wheeze  Abdomen: Soft, NTND+ BS present  Ext: Warm, well perfused no CCE  Neuro: non focal, awake, CN II-XII intact   Skin: no rash, no erythema  Psych: no SI, HI, Hallucination     LABS:                        12.2   9.44  )-----------( 169      ( 26 Jan 2024 06:43 )             36.9     01-26    142  |  106  |  12  ----------------------------<  91  4.1   |  26  |  0.5<L>    Ca    8.6      26 Jan 2024 06:43  Mg     1.6     01-25    TPro  5.7<L>  /  Alb  3.9  /  TBili  1.3<H>  /  DBili  x   /  AST  17  /  ALT  20  /  AlkPhos  82  01-26      LIVER FUNCTIONS - ( 26 Jan 2024 06:43 )  Alb: 3.9 g/dL / Pro: 5.7 g/dL / ALK PHOS: 82 U/L / ALT: 20 U/L / AST: 17 U/L / GGT: x           PT/INR - ( 25 Jan 2024 16:38 )   PT: 11.00 sec;   INR: 0.97 ratio    PTT - ( 25 Jan 2024 16:38 )  PTT:29.6 sec    MICROBIOLOGY:  Urinalysis Basic - ( 26 Jan 2024 06:43 )  Color: x / Appearance: x / SG: x / pH: x  Gluc: 91 mg/dL / Ketone: x  / Bili: x / Urobili: x   Blood: x / Protein: x / Nitrite: x   Leuk Esterase: x / RBC: x / WBC x   Sq Epi: x / Non Sq Epi: x / Bacteria: x    Blood Gas Venous - Lactate: 0.8 mmol/L (01-25-24 @ 20:29)        IMAGING:  CT Abdomen and Pelvis w/ IV Cont:   LOWER CHEST: Bibasilar subsegmental atelectasis.  HEPATOBILIARY: Unremarkable.  SPLEEN: Within normal limits.  ADRENALS: Within normal limits.  PANCREAS: Within normal limits.  KIDNEYS: Symmetric renal enhancement. No hydronephrosis. Subcentimeter hypodensity too small to further characterize  ABDOMINOPELVIC NODES: No enlarged abdominal or pelvic lymph nodes.  PELVIC ORGANS: Underdistended urinary bladder, limiting evaluation.  PERITONEUM/MESENTERY/BOWEL: No bowel obstruction, ascites or free   intraperitoneal air. The appendix is unremarkable. Colonic   diverticulosis. Wall thickening and adjacent inflammatory change   involving the descending and sigmoid colon.  BONES/SOFT TISSUES: No acute osseous abnormality.Atherosclerotic vascular calcifications.    IMPRESSION:  Wall thickening and adjacent inflammatory change involving the descending   and sigmoid colon compatible with colitis    CARDIOLOGY TESTING  12 Lead ECG:   Ventricular Rate 106 BPM  Atrial Rate 106 BPM  P-R Interval 150 ms  QRS Duration 86 ms  Q-T Interval 342 ms  QTC Calculation(Bazett) 454 ms  P Axis 51 degrees  R Axis 3 degrees  T Axis 23 degrees    Diagnosis Line Sinus tachycardia  Otherwise normal ECG    Confirmed by EMERSON MODI MD ([id number]) on 1/26/2024 7:05:10 AM (01-25-24 @ 21:03)    ASSESSMENT/PLAN:  Patient is 59 yo female with hx of Diverticulosis, and C-diff presenting with bloody diarrhea. Admitted for further management.    #Bloody Diarrhea likely due to Colitis vs Gastroenteritis  - Hgb stable at 12.2  -C-diff negative  -f/u GI PCR, and stool culture  -CT scan shows colitis   -c/w Cipro and flagyl  -Monitor H/H  -active type and screen  -GI consult     #GERD  -PPI    #Supportive Management:  Dispo:   DVT Ppx: GI Ppx: pantoprazole  Injectable 40 milliGRAM(s) IV Push daily  Diet:  Diet, Clear Liquid (01-26-24 @ 08:36) [Active]    Total time spent to complete patient's bedside assessment, review medical chart, discuss medical plan of care with covering medical team was more than 45 minutes with >50% of time spent face to face with patient, discussion with patient/family and/or coordination of care    Housestaff's notes reviewed. When there is confusion regarding the content or information provided in the notes of a housestaff (resident, medical student, physician assistant, or nurse practioner) and the attending physician notes, the attending physician's note takes precedence and supersedes the other notes.    Jose Manuel Viveros MD/Mary  Attending Physician

## 2024-01-26 NOTE — PROCEDURE
[FreeTextEntry6] : Patient is a 58 year old female with a left nasojugal cyst measuring approximately 1 x 1 cm.    The area was prepped and draped in the usual fashion.  Local anesthetic was administered using 1% lidocaine with epinephrine.  The cyst was sharply excised.  Area was irrigated copiously.  Complex wound closure was performed in layers.  The wound measured approximately 1.2  cm.  Sterile dressing applied.    Patient tolerated procedure well and understands post-op instructions.  Sutures Used: 5-0 prolene

## 2024-01-26 NOTE — CONSULT NOTE ADULT - ASSESSMENT
58yFemale pmh GERD, diverticulosis, C-diff presents for diarrhea for a few days that turned into just blood today. Patient denies sick contacts, recent abx use    5/10/23 Colonoscopy Dr. Mckeon  Impressions:  -Polyp (5 mm) in the ascending colon. (Polypectomy).  -Severe diverticulosis of the sigmoid colon.  -Melanosis coli was noted in the left side of the colon. .  -The sigmoid colon was tortuous but the colonoscope traversed the area with  manipulation.  -External hemorrhoids    #bloody diarrhea  Wall thickening and adjacent inflammatory change involving the descending   and sigmoid colon compatible with colitis  Rec  -C-diff Negative  -Check GI PCR  -avoid hypotension  -    58yFemale pmh GERD, diverticulosis, C-diff presents for diarrhea for a few days that turned into just blood today. Patient denies sick contacts, recent abx use    5/10/23 Colonoscopy Dr. Mckeon  Impressions:  -Polyp (5 mm) in the ascending colon. (Polypectomy).  -Severe diverticulosis of the sigmoid colon.  -Melanosis coli was noted in the left side of the colon. .  -The sigmoid colon was tortuous but the colonoscope traversed the area with  manipulation.  -External hemorrhoids    #bloody diarrhea  Wall thickening and adjacent inflammatory change involving the descending   and sigmoid colon compatible with colitis  infectious vs ischemic colitis  Rec  -C-diff Negative  -Check GI PCR  -updated patient's primary GI   -avoid hypotension  -soft diet, lactose free low residue   -Maintain Hemodynamic Stability   -Monitor CBC  -CMP,Optimize Electrolytes  -PT,PTT,INR  -EKG, Chest-Xray   -Transfuse prn to hgb >8  -Two large bore IV lines  -ppi ppx  -Monitor Vital Signs  -Monitor Stool For blood, frequency, consistency, melena  -Active Type and Screen  -Iron Studies, Folate, Vitamin B12 levels

## 2024-01-26 NOTE — CONSULT NOTE ADULT - SUBJECTIVE AND OBJECTIVE BOX
Chief complaint/Reason for consult:    HPI:  Patient is 58-year-old female with past medical history of Cdiff, and diverticulosis who presents to the ED with bloody stool.  Reports that she has been noticing diarrhea and bloody stool since 3:00 AM this morning, so came to the ED for evaluation.  Also endorses generalized abdominal pain along with nausea.  Denies fever, shortness of breath, chest pain, vomiting, hematuria, dysuria, and melena.  currently no bleeding  (25 Jan 2024 21:56)    58yFemale      PAST MEDICAL & SURGICAL HISTORY:      Family history:  FAMILY HISTORY:    No GI cancers in first or second degree relatives    Social History: No smoking. No alcohol. No illegal drug use.    Allergies:        MEDICATIONS:        MEDICATIONS  (STANDING):  ciprofloxacin   IVPB 400 milliGRAM(s) IV Intermittent every 12 hours  lactobacillus acidophilus 1 Tablet(s) Oral three times a day with meals  metroNIDAZOLE  IVPB 500 milliGRAM(s) IV Intermittent every 8 hours  pantoprazole  Injectable 40 milliGRAM(s) IV Push daily  sodium chloride 0.9%. 1000 milliLiter(s) (70 mL/Hr) IV Continuous <Continuous>    MEDICATIONS  (PRN):  acetaminophen     Tablet .. 650 milliGRAM(s) Oral every 6 hours PRN Temp greater or equal to 38C (100.4F), Mild Pain (1 - 3)  ondansetron Injectable 4 milliGRAM(s) IV Push every 6 hours PRN Nausea        REVIEW OF SYSTEMS  General:  No weight loss, fevers, or chills.  Eyes:  No reported pain or visual changes  ENT:  No sore throat or runny nose.  NECK: No stiffness or lymphadenopathy  CV:  No chest pain or palpitations.  Resp:  No shortness of breath, cough, wheezing or hemoptysis  GI:  No abdominal pain, nausea, vomiting, dysphagia, diarrhea or constipation. No rectal bleeding, melena, or hematemesis.  Muscle:  No aches or weakness  Neuro:  No tingling, numbness       VITALS:   T(F): 96.8 (01-26-24 @ 04:40), Max: 99.9 (01-25-24 @ 19:55)  HR: 74 (01-26-24 @ 04:40) (74 - 122)  BP: 96/58 (01-26-24 @ 04:40) (96/58 - 123/75)  RR: 18 (01-26-24 @ 04:40) (18 - 18)  SpO2: 97% (01-26-24 @ 04:40) (96% - 100%)    PHYSICAL EXAM:  GENERAL: AAOx3, no acute distress.  HEAD:  Atraumatic, Normocephalic  EYES: conjunctiva and sclera clear  NECK: Supple, No thyromegaly   CHEST/LUNG: Clear to auscultation bilaterally; No wheeze, rhonchi, or rales  HEART: Regular rate and rhythm; normal S1, S2, No murmurs.  ABDOMEN: Soft, nontender, nondistended; Bowel sounds present  NEUROLOGY: No asterixis or tremor  SKIN: Intact, no jaundice          LABS:  01-26    142  |  106  |  12  ----------------------------<  91  4.1   |  26  |  0.5<L>    Ca    8.6      26 Jan 2024 06:43  Mg     1.6     01-25    TPro  5.7<L>  /  Alb  3.9  /  TBili  1.3<H>  /  DBili  x   /  AST  17  /  ALT  20  /  AlkPhos  82  01-26                          12.2   9.44  )-----------( 169      ( 26 Jan 2024 06:43 )             36.9     LIVER FUNCTIONS - ( 26 Jan 2024 06:43 )  Alb: 3.9 g/dL / Pro: 5.7 g/dL / ALK PHOS: 82 U/L / ALT: 20 U/L / AST: 17 U/L / GGT: x           PT/INR - ( 25 Jan 2024 16:38 )   PT: 11.00 sec;   INR: 0.97 ratio         PTT - ( 25 Jan 2024 16:38 )  PTT:29.6 sec    IMAGING:    < from: CT Abdomen and Pelvis w/ IV Cont (01.25.24 @ 18:09) >    ACC: [id number] EXAM:  CT ABDOMEN AND PELVIS IC   ORDERED BY: IBRAHIMA MULLER     PROCEDURE DATE:  01/25/2024          INTERPRETATION:  CLINICAL STATEMENT: Abdominal pain.    TECHNIQUE: Contiguous axial CT images were obtained of the abdomen and   pelvisfollowing administration of intravenous contrast.  Oral contrast   was not administered. Reformatted images in the coronal and sagittal   planes were acquired.    COMPARISON CT: 2/13/2023    FINDINGS:    LOWER CHEST: Bibasilar subsegmental atelectasis.    HEPATOBILIARY: Unremarkable.    SPLEEN: Within normal limits.    ADRENALS: Within normal limits.    PANCREAS: Within normal limits.    KIDNEYS: Symmetric renal enhancement. No hydronephrosis. Subcentimeter   hypodensity too small to further characterize    ABDOMINOPELVIC NODES: No enlarged abdominal or pelvic lymph nodes.    PELVIC ORGANS: Underdistended urinary bladder, limiting evaluation.    PERITONEUM/MESENTERY/BOWEL: No bowel obstruction, ascites or free   intraperitoneal air. The appendix is unremarkable. Colonic   diverticulosis. Wall thickening and adjacent inflammatory change   involving the descending and sigmoid colon.    BONES/SOFT TISSUES: No acute osseous abnormality.Atherosclerotic vascular   calcifications.      IMPRESSION:  Wall thickening and adjacent inflammatory change involving the descending   and sigmoid colon compatible with colitis    --- End of Report ---            CLARA SERRANO MD; Attending Radiologist  This document has been electronically signed. Jan 25 2024  7:21PM    < end of copied text >       Chief complaint/Reason for consult: infectious colitis     HPI:  Patient is 58-year-old female with past medical history of Cdiff, and diverticulosis who presents to the ED with bloody stool.  Reports that she has been noticing diarrhea and bloody stool since 3:00 AM this morning, so came to the ED for evaluation.  Also endorses generalized abdominal pain along with nausea.  Denies fever, shortness of breath, chest pain, vomiting, hematuria, dysuria, and melena.  currently no bleeding  (25 Jan 2024 21:56)    GI updates: 58yFemale pmh GERD, diverticuliti      PAST MEDICAL & SURGICAL HISTORY:   Osteoarthritis      Diverticulitis      GERD (gastroesophageal reflux disease)      History of Clostridium difficile infection  jan 2023      History of umbilical hernia repair      History of bladder surgery      Cyst of neck            Family history:  FAMILY HISTORY:    No GI cancers in first or second degree relatives    Social History: No smoking. No alcohol. No illegal drug use.    Allergies:        MEDICATIONS:        MEDICATIONS  (STANDING):  ciprofloxacin   IVPB 400 milliGRAM(s) IV Intermittent every 12 hours  lactobacillus acidophilus 1 Tablet(s) Oral three times a day with meals  metroNIDAZOLE  IVPB 500 milliGRAM(s) IV Intermittent every 8 hours  pantoprazole  Injectable 40 milliGRAM(s) IV Push daily  sodium chloride 0.9%. 1000 milliLiter(s) (70 mL/Hr) IV Continuous <Continuous>    MEDICATIONS  (PRN):  acetaminophen     Tablet .. 650 milliGRAM(s) Oral every 6 hours PRN Temp greater or equal to 38C (100.4F), Mild Pain (1 - 3)  ondansetron Injectable 4 milliGRAM(s) IV Push every 6 hours PRN Nausea        REVIEW OF SYSTEMS  General:  No weight loss, fevers, or chills.  Eyes:  No reported pain or visual changes  ENT:  No sore throat or runny nose.  NECK: No stiffness or lymphadenopathy  CV:  No chest pain or palpitations.  Resp:  No shortness of breath, cough, wheezing or hemoptysis  GI:  No abdominal pain, nausea, vomiting, dysphagia, diarrhea or constipation. No rectal bleeding, melena, or hematemesis.  Muscle:  No aches or weakness  Neuro:  No tingling, numbness       VITALS:   T(F): 96.8 (01-26-24 @ 04:40), Max: 99.9 (01-25-24 @ 19:55)  HR: 74 (01-26-24 @ 04:40) (74 - 122)  BP: 96/58 (01-26-24 @ 04:40) (96/58 - 123/75)  RR: 18 (01-26-24 @ 04:40) (18 - 18)  SpO2: 97% (01-26-24 @ 04:40) (96% - 100%)    PHYSICAL EXAM:  GENERAL: AAOx3, no acute distress.  HEAD:  Atraumatic, Normocephalic  EYES: conjunctiva and sclera clear  NECK: Supple, No thyromegaly   CHEST/LUNG: Clear to auscultation bilaterally; No wheeze, rhonchi, or rales  HEART: Regular rate and rhythm; normal S1, S2, No murmurs.  ABDOMEN: Soft, nontender, nondistended; Bowel sounds present  NEUROLOGY: No asterixis or tremor  SKIN: Intact, no jaundice          LABS:  01-26    142  |  106  |  12  ----------------------------<  91  4.1   |  26  |  0.5<L>    Ca    8.6      26 Jan 2024 06:43  Mg     1.6     01-25    TPro  5.7<L>  /  Alb  3.9  /  TBili  1.3<H>  /  DBili  x   /  AST  17  /  ALT  20  /  AlkPhos  82  01-26                          12.2   9.44  )-----------( 169      ( 26 Jan 2024 06:43 )             36.9     LIVER FUNCTIONS - ( 26 Jan 2024 06:43 )  Alb: 3.9 g/dL / Pro: 5.7 g/dL / ALK PHOS: 82 U/L / ALT: 20 U/L / AST: 17 U/L / GGT: x           PT/INR - ( 25 Jan 2024 16:38 )   PT: 11.00 sec;   INR: 0.97 ratio         PTT - ( 25 Jan 2024 16:38 )  PTT:29.6 sec    IMAGING:    < from: CT Abdomen and Pelvis w/ IV Cont (01.25.24 @ 18:09) >    ACC: [id number] EXAM:  CT ABDOMEN AND PELVIS IC   ORDERED BY: IBRAHIMA MULLER     PROCEDURE DATE:  01/25/2024          INTERPRETATION:  CLINICAL STATEMENT: Abdominal pain.    TECHNIQUE: Contiguous axial CT images were obtained of the abdomen and   pelvisfollowing administration of intravenous contrast.  Oral contrast   was not administered. Reformatted images in the coronal and sagittal   planes were acquired.    COMPARISON CT: 2/13/2023    FINDINGS:    LOWER CHEST: Bibasilar subsegmental atelectasis.    HEPATOBILIARY: Unremarkable.    SPLEEN: Within normal limits.    ADRENALS: Within normal limits.    PANCREAS: Within normal limits.    KIDNEYS: Symmetric renal enhancement. No hydronephrosis. Subcentimeter   hypodensity too small to further characterize    ABDOMINOPELVIC NODES: No enlarged abdominal or pelvic lymph nodes.    PELVIC ORGANS: Underdistended urinary bladder, limiting evaluation.    PERITONEUM/MESENTERY/BOWEL: No bowel obstruction, ascites or free   intraperitoneal air. The appendix is unremarkable. Colonic   diverticulosis. Wall thickening and adjacent inflammatory change   involving the descending and sigmoid colon.    BONES/SOFT TISSUES: No acute osseous abnormality.Atherosclerotic vascular   calcifications.      IMPRESSION:  Wall thickening and adjacent inflammatory change involving the descending   and sigmoid colon compatible with colitis    --- End of Report ---            CLARA SERRANO MD; Attending Radiologist  This document has been electronically signed. Jan 25 2024  7:21PM    < end of copied text >       Chief complaint/Reason for consult: infectious colitis     HPI:  Patient is 58-year-old female with past medical history of Cdiff, and diverticulosis who presents to the ED with bloody stool.  Reports that she has been noticing diarrhea and bloody stool since 3:00 AM this morning, so came to the ED for evaluation.  Also endorses generalized abdominal pain along with nausea.  Denies fever, shortness of breath, chest pain, vomiting, hematuria, dysuria, and melena.  currently no bleeding  (2024 21:56)    GI updates: 58yFemale pmh GERD, diverticulosis, C-diff presents for diarrhea for a few days that turned into just blood today. Patient denies sick contacts, recent abx use. Patient denies nausea, vomiting, hematemesis, melena, diarrhea, constipation, +RLQ and LLQ crampy abdominal pain, +bloody diarrhea      PAST MEDICAL & SURGICAL HISTORY:   Osteoarthritis      Diverticulitis      GERD (gastroesophageal reflux disease)      History of Clostridium difficile infection  2023      History of umbilical hernia repair      History of bladder surgery      Cyst of neck        Family history:  FAMILY HISTORY:    No GI cancers in first or second degree relatives    Social History: No smoking. No alcohol. No illegal drug use.    Allergies:   aspirin (Short breath)  penicillins (Unknown)  amoxicillin (Unknown)  Grapes (Short breath)  sulfa  patient states that stitches needed to be re-opened after suture with sulfa material (Other)  erythromycin (Other)  Intolerances      MEDICATIONS: Home Medications:  Probiotic Formula:  (2024 16:19)    MEDICATIONS  (STANDING):  ciprofloxacin   IVPB 400 milliGRAM(s) IV Intermittent every 12 hours  lactobacillus acidophilus 1 Tablet(s) Oral three times a day with meals  metroNIDAZOLE  IVPB 500 milliGRAM(s) IV Intermittent every 8 hours  pantoprazole  Injectable 40 milliGRAM(s) IV Push daily  sodium chloride 0.9%. 1000 milliLiter(s) (70 mL/Hr) IV Continuous <Continuous>    MEDICATIONS  (PRN):  acetaminophen     Tablet .. 650 milliGRAM(s) Oral every 6 hours PRN Temp greater or equal to 38C (100.4F), Mild Pain (1 - 3)  ondansetron Injectable 4 milliGRAM(s) IV Push every 6 hours PRN Nausea        REVIEW OF SYSTEMS  General:  No weight loss, fevers, or chills.  Eyes:  No reported pain or visual changes  ENT:  No sore throat or runny nose.  NECK: No stiffness or lymphadenopathy  CV:  No chest pain or palpitations.  Resp:  No shortness of breath, cough, wheezing or hemoptysis  GI:  No abdominal pain, nausea, vomiting, dysphagia, +bloody diarrhea or constipation. + rectal bleeding, no melena, or hematemesis.  Muscle:  No aches or weakness  Neuro:  No tingling, numbness       VITALS:   T(F): 96.8 (24 @ 04:40), Max: 99.9 (24 @ 19:55)  HR: 74 (24 @ 04:40) (74 - 122)  BP: 96/58 (24 @ 04:40) (96/58 - 123/75)  RR: 18 (24 @ 04:40) (18 - 18)  SpO2: 97% (24 @ 04:40) (96% - 100%)    PHYSICAL EXAM:  GENERAL: AAOx3, no acute distress.  HEAD:  Atraumatic, Normocephalic  EYES: conjunctiva and sclera clear  NECK: Supple, No thyromegaly   CHEST/LUNG: Clear to auscultation bilaterally; No wheeze, rhonchi, or rales  HEART: Regular rate and rhythm; normal S1, S2, No murmurs.  ABDOMEN: Soft, nontender, nondistended; Bowel sounds present  NEUROLOGY: No asterixis or tremor  SKIN: Intact, no jaundice          LABS:      142  |  106  |  12  ----------------------------<  91  4.1   |  26  |  0.5<L>    Ca    8.6      2024 06:43  Mg     1.6         TPro  5.7<L>  /  Alb  3.9  /  TBili  1.3<H>  /  DBili  x   /  AST  17  /  ALT  20  /  AlkPhos  82                            12.2   9.44  )-----------( 169      ( 2024 06:43 )             36.9     LIVER FUNCTIONS - ( 2024 06:43 )  Alb: 3.9 g/dL / Pro: 5.7 g/dL / ALK PHOS: 82 U/L / ALT: 20 U/L / AST: 17 U/L / GGT: x           PT/INR - ( 2024 16:38 )   PT: 11.00 sec;   INR: 0.97 ratio         PTT - ( 2024 16:38 )  PTT:29.6 sec    IMAGING:    < from: CT Abdomen and Pelvis w/ IV Cont (24 @ 18:09) >    ACC: [id number] EXAM:  CT ABDOMEN AND PELVIS IC   ORDERED BY: IBRAHIMA MULLER     PROCEDURE DATE:  2024          INTERPRETATION:  CLINICAL STATEMENT: Abdominal pain.    TECHNIQUE: Contiguous axial CT images were obtained of the abdomen and   pelvisfollowing administration of intravenous contrast.  Oral contrast   was not administered. Reformatted images in the coronal and sagittal   planes were acquired.    COMPARISON CT: 2023    FINDINGS:    LOWER CHEST: Bibasilar subsegmental atelectasis.    HEPATOBILIARY: Unremarkable.    SPLEEN: Within normal limits.    ADRENALS: Within normal limits.    PANCREAS: Within normal limits.    KIDNEYS: Symmetric renal enhancement. No hydronephrosis. Subcentimeter   hypodensity too small to further characterize    ABDOMINOPELVIC NODES: No enlarged abdominal or pelvic lymph nodes.    PELVIC ORGANS: Underdistended urinary bladder, limiting evaluation.    PERITONEUM/MESENTERY/BOWEL: No bowel obstruction, ascites or free   intraperitoneal air. The appendix is unremarkable. Colonic   diverticulosis. Wall thickening and adjacent inflammatory change   involving the descending and sigmoid colon.    BONES/SOFT TISSUES: No acute osseous abnormality.Atherosclerotic vascular   calcifications.      IMPRESSION:  Wall thickening and adjacent inflammatory change involving the descending   and sigmoid colon compatible with colitis    --- End of Report ---            CLARA SERRANO MD; Attending Radiologist  This document has been electronically signed. 2024  7:21PM    < end of copied text >    < from: Colonoscopy (05.10.23 @ 09:30) >  Colonoscopy Report    Date: 5/10/2023 9:30 AM    Patient Name: EFREN HONG    MRN: [id number]    Account Number:    [id number]    Gender: Female     (age): 1965 (57)    Instrument(s):    [id number])([id number])    Attending/Fellow:    MD Kieran Lamb MD        Procedure Room #:    [place] [address]    Referring Physician:    NEVIN HAIDER    [address] [place] [id number]    [phone] (phone)    [phone] (fax)        Nurse:    Nicki Lemon    History of Present Illness:    H&P was previously reviewed.    Administered Medications:    As per Anesthesiology Record    Indications:    Screening for colon cancer - Z12.11    Procedure:    This is a average risk patient  undergoing a screening colonoscopy. Prior  colonoscopy was performed less than one year ago.    The procedure, indications, preparation and potential complications were  explained to the patient, who indicated understanding and signed the  corresponding consent forms. MAC was administered by the anesthesiologist.  Continuous pulse oximetry and blood pressure monitoring were used throughout the  procedure. Supplemental oxygen was used. The quality of preparation was 6-9 on  the BBP scale/adequate. Patient was placed in left lateral decubitus position.  Digital exam was normal. The colonoscope was introduced through the rectum and  advanced under direct visualization until cecum and terminal ileum was reached.  The appendiceal orifice and the ileocecal valve were identified. The terminal  ileum was identified. Careful visualization was performed as the instrument was  withdrawn. Retroflexion in the rectum was performed successfully and there were  no remarkable findings. Patient tolerance to procedure was excellent. The  procedure was not difficult. Blood loss was none..    [place] Bowel Preparation Score:    Using the [place] Bowel Preparation Score, each segment of the colon was graded  as follows:    Left Colon: Good, 2 | Transverse Colon: Good, 2  | Right Colon: Good, 2    Limitations/Complications:    There were no apparent limitations or complications    Findings:    Excavated lesions Several non-bleeding diverticula were seen in the sigmoid  colon. Diverticulosis appeared to be severe.    Protruding lesions A single 5 mm polyp was found in the ascending colon.A  single-piece polypectomy was performed using a cold forceps in the ascending  colon. The polyp was completely retrieved.    Small external hemorrhoids were noted.    Additional findings Melanosis coli was noted in the left side of the colon..    The sigmoid colon was tortuous but the colonoscope traversed the area with  manipulation.    Impressions:    Polyp (5 mm) in the ascending colon. (Polypectomy).    Severe diverticulosis of the sigmoid colon.    Melanosis coli was noted in the left side of the colon. .    The sigmoid colon was tortuous but the colonoscope traversed the area with  manipulation.    External hemorrhoids.    < end of copied text >

## 2024-01-26 NOTE — CHART NOTE - NSCHARTNOTEFT_GEN_A_CORE
Pt requesting clear liquid diet  Pt feeling better overall still has intermittent lower abd pain/cramping 4/10.  Diarrhea and hematochezia decreasing, hgb stable last nite.  Will start clear liquids diet, d/w Dr Presley  GI consult pending.

## 2024-01-27 LAB
ALBUMIN SERPL ELPH-MCNC: 3.5 G/DL — SIGNIFICANT CHANGE UP (ref 3.5–5.2)
ALP SERPL-CCNC: 72 U/L — SIGNIFICANT CHANGE UP (ref 30–115)
ALT FLD-CCNC: 18 U/L — SIGNIFICANT CHANGE UP (ref 0–41)
ANION GAP SERPL CALC-SCNC: 9 MMOL/L — SIGNIFICANT CHANGE UP (ref 7–14)
AST SERPL-CCNC: 13 U/L — SIGNIFICANT CHANGE UP (ref 0–41)
BASOPHILS # BLD AUTO: 0.04 K/UL — SIGNIFICANT CHANGE UP (ref 0–0.2)
BASOPHILS NFR BLD AUTO: 0.5 % — SIGNIFICANT CHANGE UP (ref 0–1)
BILIRUB SERPL-MCNC: 0.6 MG/DL — SIGNIFICANT CHANGE UP (ref 0.2–1.2)
BUN SERPL-MCNC: 7 MG/DL — LOW (ref 10–20)
CALCIUM SERPL-MCNC: 8.7 MG/DL — SIGNIFICANT CHANGE UP (ref 8.4–10.5)
CHLORIDE SERPL-SCNC: 104 MMOL/L — SIGNIFICANT CHANGE UP (ref 98–110)
CO2 SERPL-SCNC: 26 MMOL/L — SIGNIFICANT CHANGE UP (ref 17–32)
CREAT SERPL-MCNC: 0.6 MG/DL — LOW (ref 0.7–1.5)
CULTURE RESULTS: SIGNIFICANT CHANGE UP
EGFR: 103 ML/MIN/1.73M2 — SIGNIFICANT CHANGE UP
EOSINOPHIL # BLD AUTO: 0.18 K/UL — SIGNIFICANT CHANGE UP (ref 0–0.7)
EOSINOPHIL NFR BLD AUTO: 2.1 % — SIGNIFICANT CHANGE UP (ref 0–8)
GI PCR PANEL: SIGNIFICANT CHANGE UP
GLUCOSE SERPL-MCNC: 87 MG/DL — SIGNIFICANT CHANGE UP (ref 70–99)
HCT VFR BLD CALC: 36.4 % — LOW (ref 37–47)
HGB BLD-MCNC: 12.1 G/DL — SIGNIFICANT CHANGE UP (ref 12–16)
IMM GRANULOCYTES NFR BLD AUTO: 0.2 % — SIGNIFICANT CHANGE UP (ref 0.1–0.3)
LYMPHOCYTES # BLD AUTO: 1 K/UL — LOW (ref 1.2–3.4)
LYMPHOCYTES # BLD AUTO: 11.7 % — LOW (ref 20.5–51.1)
MAGNESIUM SERPL-MCNC: 1.7 MG/DL — LOW (ref 1.8–2.4)
MCHC RBC-ENTMCNC: 31.9 PG — HIGH (ref 27–31)
MCHC RBC-ENTMCNC: 33.2 G/DL — SIGNIFICANT CHANGE UP (ref 32–37)
MCV RBC AUTO: 96 FL — SIGNIFICANT CHANGE UP (ref 81–99)
MONOCYTES # BLD AUTO: 0.62 K/UL — HIGH (ref 0.1–0.6)
MONOCYTES NFR BLD AUTO: 7.2 % — SIGNIFICANT CHANGE UP (ref 1.7–9.3)
NEUTROPHILS # BLD AUTO: 6.72 K/UL — HIGH (ref 1.4–6.5)
NEUTROPHILS NFR BLD AUTO: 78.3 % — HIGH (ref 42.2–75.2)
NRBC # BLD: 0 /100 WBCS — SIGNIFICANT CHANGE UP (ref 0–0)
PHOSPHATE SERPL-MCNC: 3.1 MG/DL — SIGNIFICANT CHANGE UP (ref 2.1–4.9)
PLATELET # BLD AUTO: 160 K/UL — SIGNIFICANT CHANGE UP (ref 130–400)
PMV BLD: 11.9 FL — HIGH (ref 7.4–10.4)
POTASSIUM SERPL-MCNC: 4.1 MMOL/L — SIGNIFICANT CHANGE UP (ref 3.5–5)
POTASSIUM SERPL-SCNC: 4.1 MMOL/L — SIGNIFICANT CHANGE UP (ref 3.5–5)
PROT SERPL-MCNC: 5.6 G/DL — LOW (ref 6–8)
RBC # BLD: 3.79 M/UL — LOW (ref 4.2–5.4)
RBC # FLD: 12.2 % — SIGNIFICANT CHANGE UP (ref 11.5–14.5)
SODIUM SERPL-SCNC: 139 MMOL/L — SIGNIFICANT CHANGE UP (ref 135–146)
SPECIMEN SOURCE: SIGNIFICANT CHANGE UP
WBC # BLD: 8.58 K/UL — SIGNIFICANT CHANGE UP (ref 4.8–10.8)
WBC # FLD AUTO: 8.58 K/UL — SIGNIFICANT CHANGE UP (ref 4.8–10.8)

## 2024-01-27 PROCEDURE — 99232 SBSQ HOSP IP/OBS MODERATE 35: CPT

## 2024-01-27 RX ORDER — MAGNESIUM SULFATE 500 MG/ML
2 VIAL (ML) INJECTION ONCE
Refills: 0 | Status: COMPLETED | OUTPATIENT
Start: 2024-01-27 | End: 2024-01-27

## 2024-01-27 RX ADMIN — Medication 100 MILLIGRAM(S): at 21:16

## 2024-01-27 RX ADMIN — Medication 1 TABLET(S): at 16:01

## 2024-01-27 RX ADMIN — Medication 100 MILLIGRAM(S): at 13:55

## 2024-01-27 RX ADMIN — MORPHINE SULFATE 2 MILLIGRAM(S): 50 CAPSULE, EXTENDED RELEASE ORAL at 00:00

## 2024-01-27 RX ADMIN — Medication 200 MILLIGRAM(S): at 05:19

## 2024-01-27 RX ADMIN — SODIUM CHLORIDE 70 MILLILITER(S): 9 INJECTION INTRAMUSCULAR; INTRAVENOUS; SUBCUTANEOUS at 13:55

## 2024-01-27 RX ADMIN — SIMETHICONE 80 MILLIGRAM(S): 80 TABLET, CHEWABLE ORAL at 08:01

## 2024-01-27 RX ADMIN — SODIUM CHLORIDE 70 MILLILITER(S): 9 INJECTION INTRAMUSCULAR; INTRAVENOUS; SUBCUTANEOUS at 08:01

## 2024-01-27 RX ADMIN — Medication 100 MILLIGRAM(S): at 05:19

## 2024-01-27 RX ADMIN — PANTOPRAZOLE SODIUM 40 MILLIGRAM(S): 20 TABLET, DELAYED RELEASE ORAL at 11:34

## 2024-01-27 RX ADMIN — SODIUM CHLORIDE 70 MILLILITER(S): 9 INJECTION INTRAMUSCULAR; INTRAVENOUS; SUBCUTANEOUS at 17:29

## 2024-01-27 RX ADMIN — Medication 200 MILLIGRAM(S): at 17:30

## 2024-01-27 RX ADMIN — Medication 1 TABLET(S): at 11:34

## 2024-01-27 RX ADMIN — Medication 1 TABLET(S): at 08:00

## 2024-01-27 RX ADMIN — Medication 25 GRAM(S): at 15:57

## 2024-01-27 RX ADMIN — SIMETHICONE 80 MILLIGRAM(S): 80 TABLET, CHEWABLE ORAL at 15:59

## 2024-01-27 RX ADMIN — SODIUM CHLORIDE 70 MILLILITER(S): 9 INJECTION INTRAMUSCULAR; INTRAVENOUS; SUBCUTANEOUS at 15:57

## 2024-01-27 NOTE — PROGRESS NOTE ADULT - SUBJECTIVE AND OBJECTIVE BOX
SUBJECTIVE:    Patient is a 58y old Female who presents with a chief complaint of diarrhea blood per stool (26 Jan 2024 12:11)    Currently admitted to medicine with the primary diagnosis of Colitis       Today is hospital day 2d. This morning she is resting comfortably in bed and reports no new issues or overnight events.     ROS:   CONSTITUTIONAL: No weakness, fevers or chills   EYES/ENT: No visual changes; No vertigo or throat pain   NECK: No pain or stiffness   RESPIRATORY: No cough, wheezing, hemoptysis; No shortness of breath   CARDIOVASCULAR: No chest pain or palpitations   GASTROINTESTINAL: No abdominal or epigastric pain. No nausea, vomiting, or hematemesis; No diarrhea or constipation. No melena or hematochezia.  GENITOURINARY: No dysuria, frequency or hematuria  NEUROLOGICAL: No numbness or weakness  SKIN: No itching, rashes      PAST MEDICAL & SURGICAL HISTORY  Osteoarthritis    Diverticulitis    GERD (gastroesophageal reflux disease)    History of Clostridium difficile infection  jan 2023    History of umbilical hernia repair    History of bladder surgery    Cyst of neck      SOCIAL HISTORY:    ALLERGIES:  aspirin (Short breath)  penicillins (Unknown)  amoxicillin (Unknown)  Grapes (Short breath)  sulfa  patient states that stitches needed to be re-opened after suture with sulfa material (Other)  erythromycin (Other)    MEDICATIONS:  STANDING MEDICATIONS  ciprofloxacin   IVPB 400 milliGRAM(s) IV Intermittent every 12 hours  lactobacillus acidophilus 1 Tablet(s) Oral three times a day with meals  magnesium sulfate  IVPB 2 Gram(s) IV Intermittent once  metroNIDAZOLE  IVPB 500 milliGRAM(s) IV Intermittent every 8 hours  pantoprazole  Injectable 40 milliGRAM(s) IV Push daily  sodium chloride 0.9%. 1000 milliLiter(s) IV Continuous <Continuous>    PRN MEDICATIONS  acetaminophen     Tablet .. 650 milliGRAM(s) Oral every 6 hours PRN  morphine  - Injectable 2 milliGRAM(s) IV Push every 4 hours PRN  ondansetron Injectable 4 milliGRAM(s) IV Push every 6 hours PRN  simethicone 80 milliGRAM(s) Chew three times a day PRN    VITALS:   T(F): 96.4  HR: 91  BP: 106/67  RR: 18  SpO2: 98%    LABS:  Negative for smoking/alcohol/drug use.                         12.1   8.58  )-----------( 160      ( 27 Jan 2024 07:43 )             36.4     01-27    139  |  104  |  7<L>  ----------------------------<  87  4.1   |  26  |  0.6<L>    Ca    8.7      27 Jan 2024 07:43  Phos  3.1     01-27  Mg     1.7     01-27    TPro  5.6<L>  /  Alb  3.5  /  TBili  0.6  /  DBili  x   /  AST  13  /  ALT  18  /  AlkPhos  72  01-27    PT/INR - ( 25 Jan 2024 16:38 )   PT: 11.00 sec;   INR: 0.97 ratio         PTT - ( 25 Jan 2024 16:38 )  PTT:29.6 sec  Urinalysis Basic - ( 27 Jan 2024 07:43 )    Color: x / Appearance: x / SG: x / pH: x  Gluc: 87 mg/dL / Ketone: x  / Bili: x / Urobili: x   Blood: x / Protein: x / Nitrite: x   Leuk Esterase: x / RBC: x / WBC x   Sq Epi: x / Non Sq Epi: x / Bacteria: x      Culture - Blood (collected 25 Jan 2024 20:15)  Source: .Blood Blood  Preliminary Report (27 Jan 2024 03:02):    No growth at 24 hours    Culture - Blood (collected 25 Jan 2024 20:15)  Source: .Blood Blood  Preliminary Report (27 Jan 2024 03:02):    No growth at 24 hours          RADIOLOGY:    PHYSICAL EXAM:  GEN: No acute distress  HEENT: normocephalic, atraumatic, aniceteric  LUNGS: bl breath sounds   HEART: S1/S2 present. RRR, no murmurs  ABD: Soft, non-tender, non-distended. Bowel sounds present  EXT: warm   NEURO: AAOX3, normal affect      ASSESSMENT AND PLAN:    57 yo female with hx of Diverticulosis, and C-diff presenting with bloody diarrhea. Admitted for further management.    # Bloody Diarrhea - resolved   2/2 Colitis vs Gastroenteritis  # Diverticulosis on CT Scan   # h/o colonoscopy 5/23 with Dr. Mckeon ( + external hemorrhoids and severe diverticulosis)   - hemodynamically stable ; Hb  stable at 12 , monitor cbc , active type and screen, 2 large bore iv's   - Lactate 0.8  -C-diff negative/ GI PCR neg/ fu stool clx   - CT Abdomen and Pelvis w/ IV Cont (01.25.24 @ 18:09) >  No bowel obstruction, ascites or free   intraperitoneal air. The appendix is unremarkable. Colonic   diverticulosis. Wall thickening and adjacent inflammatory change   involving the descending and sigmoid colon compatible with colitis   -c/w Cipro and flagyl  - GI EVAL appreciated, will need fu if stool clx negative     # Hypomagnesemia - replaced, fu repeat level     # H/O GERD- cw PPI     # dvt/ gi ppx/diet  # dispo: acute  # handoff: fu stool clx - if neg- will need gi fu (has history of severe diverticulosis)

## 2024-01-28 LAB
ALBUMIN SERPL ELPH-MCNC: 4.1 G/DL — SIGNIFICANT CHANGE UP (ref 3.5–5.2)
ALP SERPL-CCNC: 78 U/L — SIGNIFICANT CHANGE UP (ref 30–115)
ALT FLD-CCNC: 19 U/L — SIGNIFICANT CHANGE UP (ref 0–41)
ANION GAP SERPL CALC-SCNC: 14 MMOL/L — SIGNIFICANT CHANGE UP (ref 7–14)
AST SERPL-CCNC: 17 U/L — SIGNIFICANT CHANGE UP (ref 0–41)
BASOPHILS # BLD AUTO: 0.03 K/UL — SIGNIFICANT CHANGE UP (ref 0–0.2)
BASOPHILS NFR BLD AUTO: 0.5 % — SIGNIFICANT CHANGE UP (ref 0–1)
BILIRUB SERPL-MCNC: 0.5 MG/DL — SIGNIFICANT CHANGE UP (ref 0.2–1.2)
BUN SERPL-MCNC: 14 MG/DL — SIGNIFICANT CHANGE UP (ref 10–20)
CALCIUM SERPL-MCNC: 9.1 MG/DL — SIGNIFICANT CHANGE UP (ref 8.4–10.5)
CHLORIDE SERPL-SCNC: 104 MMOL/L — SIGNIFICANT CHANGE UP (ref 98–110)
CO2 SERPL-SCNC: 22 MMOL/L — SIGNIFICANT CHANGE UP (ref 17–32)
CREAT SERPL-MCNC: 0.6 MG/DL — LOW (ref 0.7–1.5)
EGFR: 103 ML/MIN/1.73M2 — SIGNIFICANT CHANGE UP
EOSINOPHIL # BLD AUTO: 0.24 K/UL — SIGNIFICANT CHANGE UP (ref 0–0.7)
EOSINOPHIL NFR BLD AUTO: 4.1 % — SIGNIFICANT CHANGE UP (ref 0–8)
GLUCOSE SERPL-MCNC: 102 MG/DL — HIGH (ref 70–99)
HCT VFR BLD CALC: 35.9 % — LOW (ref 37–47)
HGB BLD-MCNC: 12.8 G/DL — SIGNIFICANT CHANGE UP (ref 12–16)
IMM GRANULOCYTES NFR BLD AUTO: 0.2 % — SIGNIFICANT CHANGE UP (ref 0.1–0.3)
LYMPHOCYTES # BLD AUTO: 0.84 K/UL — LOW (ref 1.2–3.4)
LYMPHOCYTES # BLD AUTO: 14.4 % — LOW (ref 20.5–51.1)
MAGNESIUM SERPL-MCNC: 1.9 MG/DL — SIGNIFICANT CHANGE UP (ref 1.8–2.4)
MCHC RBC-ENTMCNC: 32.6 PG — HIGH (ref 27–31)
MCHC RBC-ENTMCNC: 35.7 G/DL — SIGNIFICANT CHANGE UP (ref 32–37)
MCV RBC AUTO: 91.3 FL — SIGNIFICANT CHANGE UP (ref 81–99)
MONOCYTES # BLD AUTO: 0.49 K/UL — SIGNIFICANT CHANGE UP (ref 0.1–0.6)
MONOCYTES NFR BLD AUTO: 8.4 % — SIGNIFICANT CHANGE UP (ref 1.7–9.3)
NEUTROPHILS # BLD AUTO: 4.22 K/UL — SIGNIFICANT CHANGE UP (ref 1.4–6.5)
NEUTROPHILS NFR BLD AUTO: 72.4 % — SIGNIFICANT CHANGE UP (ref 42.2–75.2)
NRBC # BLD: 0 /100 WBCS — SIGNIFICANT CHANGE UP (ref 0–0)
PLATELET # BLD AUTO: 168 K/UL — SIGNIFICANT CHANGE UP (ref 130–400)
PMV BLD: 11.7 FL — HIGH (ref 7.4–10.4)
POTASSIUM SERPL-MCNC: 4.2 MMOL/L — SIGNIFICANT CHANGE UP (ref 3.5–5)
POTASSIUM SERPL-SCNC: 4.2 MMOL/L — SIGNIFICANT CHANGE UP (ref 3.5–5)
PROT SERPL-MCNC: 6 G/DL — SIGNIFICANT CHANGE UP (ref 6–8)
RBC # BLD: 3.93 M/UL — LOW (ref 4.2–5.4)
RBC # FLD: 12.1 % — SIGNIFICANT CHANGE UP (ref 11.5–14.5)
SODIUM SERPL-SCNC: 140 MMOL/L — SIGNIFICANT CHANGE UP (ref 135–146)
WBC # BLD: 5.83 K/UL — SIGNIFICANT CHANGE UP (ref 4.8–10.8)
WBC # FLD AUTO: 5.83 K/UL — SIGNIFICANT CHANGE UP (ref 4.8–10.8)

## 2024-01-28 PROCEDURE — 99232 SBSQ HOSP IP/OBS MODERATE 35: CPT

## 2024-01-28 RX ORDER — METRONIDAZOLE 500 MG
500 TABLET ORAL EVERY 8 HOURS
Refills: 0 | Status: DISCONTINUED | OUTPATIENT
Start: 2024-01-28 | End: 2024-01-29

## 2024-01-28 RX ORDER — CIPROFLOXACIN LACTATE 400MG/40ML
500 VIAL (ML) INTRAVENOUS EVERY 12 HOURS
Refills: 0 | Status: DISCONTINUED | OUTPATIENT
Start: 2024-01-28 | End: 2024-01-29

## 2024-01-28 RX ORDER — POLYETHYLENE GLYCOL 3350 17 G/17G
17 POWDER, FOR SOLUTION ORAL DAILY
Refills: 0 | Status: DISCONTINUED | OUTPATIENT
Start: 2024-01-28 | End: 2024-01-29

## 2024-01-28 RX ORDER — SENNA PLUS 8.6 MG/1
2 TABLET ORAL AT BEDTIME
Refills: 0 | Status: DISCONTINUED | OUTPATIENT
Start: 2024-01-28 | End: 2024-01-29

## 2024-01-28 RX ADMIN — Medication 1 TABLET(S): at 11:51

## 2024-01-28 RX ADMIN — Medication 500 MILLIGRAM(S): at 21:30

## 2024-01-28 RX ADMIN — SIMETHICONE 80 MILLIGRAM(S): 80 TABLET, CHEWABLE ORAL at 07:38

## 2024-01-28 RX ADMIN — Medication 500 MILLIGRAM(S): at 18:07

## 2024-01-28 RX ADMIN — SIMETHICONE 80 MILLIGRAM(S): 80 TABLET, CHEWABLE ORAL at 16:21

## 2024-01-28 RX ADMIN — Medication 1 TABLET(S): at 16:21

## 2024-01-28 RX ADMIN — Medication 1 TABLET(S): at 07:38

## 2024-01-28 RX ADMIN — Medication 200 MILLIGRAM(S): at 06:08

## 2024-01-28 RX ADMIN — Medication 100 MILLIGRAM(S): at 05:09

## 2024-01-28 RX ADMIN — SIMETHICONE 80 MILLIGRAM(S): 80 TABLET, CHEWABLE ORAL at 11:52

## 2024-01-28 RX ADMIN — SENNA PLUS 2 TABLET(S): 8.6 TABLET ORAL at 21:31

## 2024-01-28 NOTE — PROGRESS NOTE ADULT - SUBJECTIVE AND OBJECTIVE BOX
SUBJECTIVE:    Patient is a 58y old Female who presents with a chief complaint of diarrhea blood per stool (27 Jan 2024 15:48)    Currently admitted to medicine with the primary diagnosis of Colitis       Today is hospital day 3d. This morning she is resting comfortably in bed and reports no new issues or overnight events.     ROS:   CONSTITUTIONAL: No weakness, fevers or chills   EYES/ENT: No visual changes; No vertigo or throat pain   NECK: No pain or stiffness   RESPIRATORY: No cough, wheezing, hemoptysis; No shortness of breath   CARDIOVASCULAR: No chest pain or palpitations   GASTROINTESTINAL: No abdominal or epigastric pain. No nausea, vomiting, or hematemesis; No diarrhea or constipation. No melena or hematochezia.  GENITOURINARY: No dysuria, frequency or hematuria  NEUROLOGICAL: No numbness or weakness        PAST MEDICAL & SURGICAL HISTORY  Osteoarthritis    Diverticulitis    GERD (gastroesophageal reflux disease)    History of Clostridium difficile infection  jan 2023    History of umbilical hernia repair    History of bladder surgery    Cyst of neck      SOCIAL HISTORY:    ALLERGIES:  aspirin (Short breath)  penicillins (Unknown)  amoxicillin (Unknown)  Grapes (Short breath)  sulfa  patient states that stitches needed to be re-opened after suture with sulfa material (Other)  erythromycin (Other)    MEDICATIONS:  STANDING MEDICATIONS  ciprofloxacin     Tablet 500 milliGRAM(s) Oral every 12 hours  lactobacillus acidophilus 1 Tablet(s) Oral three times a day with meals  metroNIDAZOLE    Tablet 500 milliGRAM(s) Oral every 8 hours  pantoprazole  Injectable 40 milliGRAM(s) IV Push daily  senna 2 Tablet(s) Oral at bedtime    PRN MEDICATIONS  acetaminophen     Tablet .. 650 milliGRAM(s) Oral every 6 hours PRN  ondansetron Injectable 4 milliGRAM(s) IV Push every 6 hours PRN  simethicone 80 milliGRAM(s) Chew three times a day PRN    VITALS:   T(F): 100.2  HR: 85  BP: 112/66  RR: 18  SpO2: 95%    LABS:  Negative for smoking/alcohol/drug use.                         12.8   5.83  )-----------( 168      ( 28 Jan 2024 04:30 )             35.9     01-28    140  |  104  |  14  ----------------------------<  102<H>  4.2   |  22  |  0.6<L>    Ca    9.1      28 Jan 2024 04:30  Phos  3.1     01-27  Mg     1.9     01-28    TPro  6.0  /  Alb  4.1  /  TBili  0.5  /  DBili  x   /  AST  17  /  ALT  19  /  AlkPhos  78  01-28      Urinalysis Basic - ( 28 Jan 2024 04:30 )    Color: x / Appearance: x / SG: x / pH: x  Gluc: 102 mg/dL / Ketone: x  / Bili: x / Urobili: x   Blood: x / Protein: x / Nitrite: x   Leuk Esterase: x / RBC: x / WBC x   Sq Epi: x / Non Sq Epi: x / Bacteria: x            Culture - Stool (collected 26 Jan 2024 11:00)  Source: .Stool Feces  Preliminary Report (27 Jan 2024 18:13):    No enteric pathogens to date: Final culture pending    No enteric gram negative rods isolated    Culture - Urine (collected 26 Jan 2024 01:42)  Source: Clean Catch Clean Catch (Midstream)  Final Report (27 Jan 2024 22:35):    <10,000 CFU/mL Normal Urogenital Nickie    Culture - Blood (collected 25 Jan 2024 20:15)  Source: .Blood Blood  Preliminary Report (28 Jan 2024 03:01):    No growth at 48 Hours    Culture - Blood (collected 25 Jan 2024 20:15)  Source: .Blood Blood  Preliminary Report (28 Jan 2024 03:01):    No growth at 48 Hours          RADIOLOGY:    PHYSICAL EXAM:  GEN: No acute distress  HEENT: normocephalic, atraumatic, aniceteric  LUNGS: Clear to auscultation bilaterally, no rales/wheezing/ rhonchi  HEART: S1/S2 present. RRR, no murmurs  ABD: Soft, non-tender, non-distended. Bowel sounds present  EXT: warm , no edema   NEURO: AAOX3, normal affect      ASSESSMENT AND PLAN:    59 yo female with hx of Diverticulosis, and C-diff presenting with bloody diarrhea. Admitted for further management.    # Bloody Diarrhea - resolved   2/2 Colitis vs Gastroenteritis  # Diverticulosis on CT Scan   # h/o colonoscopy 5/23 with Dr. Mckeon ( + external hemorrhoids and severe diverticulosis)   - hemodynamically stable ; Hb  stable at 12 , monitor cbc , active type and screen, 2 large bore iv's   - Lactate 0.8  -C-diff negative/ GI PCR neg/ fu final stool clx   - CT Abdomen and Pelvis w/ IV Cont (01.25.24 @ 18:09) >  No bowel obstruction, ascites or free   intraperitoneal air. The appendix is unremarkable. Colonic   diverticulosis. Wall thickening and adjacent inflammatory change   involving the descending and sigmoid colon compatible with colitis   -c/w Cipro and flagyl, switched to po - patient declined new IV placement   - GI EVAL appreciated    # Hypomagnesemia - replaced, fu repeat level     # H/O GERD- cw PPI     # dvt/ gi ppx/diet   # dispo: acute  # handoff: fu final stool clx - possible dc in 24 hrs      SUBJECTIVE:    Patient is a 58y old Female who presents with a chief complaint of diarrhea blood per stool (27 Jan 2024 15:48)    Currently admitted to medicine with the primary diagnosis of Colitis       Today is hospital day 3d. This morning she is resting comfortably in bed and reports no new issues or overnight events.     ROS:   CONSTITUTIONAL: No weakness, fevers or chills   EYES/ENT: No visual changes; No vertigo or throat pain   NECK: No pain or stiffness   RESPIRATORY: No cough, wheezing, hemoptysis; No shortness of breath   CARDIOVASCULAR: No chest pain or palpitations   GASTROINTESTINAL: No abdominal or epigastric pain. No nausea, vomiting, or hematemesis; No diarrhea or constipation. No melena or hematochezia.  GENITOURINARY: No dysuria, frequency or hematuria  NEUROLOGICAL: No numbness or weakness        PAST MEDICAL & SURGICAL HISTORY  Osteoarthritis    Diverticulitis    GERD (gastroesophageal reflux disease)    History of Clostridium difficile infection  jan 2023    History of umbilical hernia repair    History of bladder surgery    Cyst of neck      SOCIAL HISTORY:    ALLERGIES:  aspirin (Short breath)  penicillins (Unknown)  amoxicillin (Unknown)  Grapes (Short breath)  sulfa  patient states that stitches needed to be re-opened after suture with sulfa material (Other)  erythromycin (Other)    MEDICATIONS:  STANDING MEDICATIONS  ciprofloxacin     Tablet 500 milliGRAM(s) Oral every 12 hours  lactobacillus acidophilus 1 Tablet(s) Oral three times a day with meals  metroNIDAZOLE    Tablet 500 milliGRAM(s) Oral every 8 hours  pantoprazole  Injectable 40 milliGRAM(s) IV Push daily  senna 2 Tablet(s) Oral at bedtime    PRN MEDICATIONS  acetaminophen     Tablet .. 650 milliGRAM(s) Oral every 6 hours PRN  ondansetron Injectable 4 milliGRAM(s) IV Push every 6 hours PRN  simethicone 80 milliGRAM(s) Chew three times a day PRN    VITALS:   T(F): 100.2  HR: 85  BP: 112/66  RR: 18  SpO2: 95%    LABS:  Negative for smoking/alcohol/drug use.                         12.8   5.83  )-----------( 168      ( 28 Jan 2024 04:30 )             35.9     01-28    140  |  104  |  14  ----------------------------<  102<H>  4.2   |  22  |  0.6<L>    Ca    9.1      28 Jan 2024 04:30  Phos  3.1     01-27  Mg     1.9     01-28    TPro  6.0  /  Alb  4.1  /  TBili  0.5  /  DBili  x   /  AST  17  /  ALT  19  /  AlkPhos  78  01-28      Urinalysis Basic - ( 28 Jan 2024 04:30 )    Color: x / Appearance: x / SG: x / pH: x  Gluc: 102 mg/dL / Ketone: x  / Bili: x / Urobili: x   Blood: x / Protein: x / Nitrite: x   Leuk Esterase: x / RBC: x / WBC x   Sq Epi: x / Non Sq Epi: x / Bacteria: x            Culture - Stool (collected 26 Jan 2024 11:00)  Source: .Stool Feces  Preliminary Report (27 Jan 2024 18:13):    No enteric pathogens to date: Final culture pending    No enteric gram negative rods isolated    Culture - Urine (collected 26 Jan 2024 01:42)  Source: Clean Catch Clean Catch (Midstream)  Final Report (27 Jan 2024 22:35):    <10,000 CFU/mL Normal Urogenital Nickie    Culture - Blood (collected 25 Jan 2024 20:15)  Source: .Blood Blood  Preliminary Report (28 Jan 2024 03:01):    No growth at 48 Hours    Culture - Blood (collected 25 Jan 2024 20:15)  Source: .Blood Blood  Preliminary Report (28 Jan 2024 03:01):    No growth at 48 Hours          RADIOLOGY:    PHYSICAL EXAM:  GEN: No acute distress  HEENT: normocephalic, atraumatic, aniceteric  LUNGS: Clear to auscultation bilaterally, no rales/wheezing/ rhonchi  HEART: S1/S2 present. RRR, no murmurs  ABD: Soft, non-tender, non-distended. Bowel sounds present  EXT: warm , no edema   NEURO: AAOX3, normal affect      ASSESSMENT AND PLAN:    59 yo female with hx of Diverticulosis, and C-diff presenting with bloody diarrhea. Admitted for further management.    # Bloody Diarrhea - resolved - now with constipation 1/28  2/2 Colitis vs Gastroenteritis  # Diverticulosis on CT Scan   # h/o colonoscopy 5/23 with Dr. Mckeon ( + external hemorrhoids and severe diverticulosis)   - hemodynamically stable ; Hb  stable at 12 , monitor cbc , active type and screen, 2 large bore iv's   - Lactate 0.8  -C-diff negative/ GI PCR neg/ fu final stool clx   - CT Abdomen and Pelvis w/ IV Cont (01.25.24 @ 18:09) >  No bowel obstruction, ascites or free   intraperitoneal air. The appendix is unremarkable. Colonic   diverticulosis. Wall thickening and adjacent inflammatory change   involving the descending and sigmoid colon compatible with colitis   -c/w Cipro and flagyl, switched to po - patient declined new IV placement   - GI EVAL appreciated  - bowel regimen started     # Hypomagnesemia - replaced, fu repeat level     # H/O GERD- cw PPI     # dvt/ gi ppx/diet   # dispo: acute  # handoff: fu final stool clx and resolution of constipation- possible dc in 24 hrs

## 2024-01-29 ENCOUNTER — TRANSCRIPTION ENCOUNTER (OUTPATIENT)
Age: 59
End: 2024-01-29

## 2024-01-29 ENCOUNTER — APPOINTMENT (OUTPATIENT)
Dept: PLASTIC SURGERY | Facility: CLINIC | Age: 59
End: 2024-01-29
Payer: COMMERCIAL

## 2024-01-29 VITALS
TEMPERATURE: 97 F | OXYGEN SATURATION: 94 % | SYSTOLIC BLOOD PRESSURE: 95 MMHG | DIASTOLIC BLOOD PRESSURE: 52 MMHG | HEART RATE: 66 BPM | RESPIRATION RATE: 16 BRPM

## 2024-01-29 LAB
CULTURE RESULTS: SIGNIFICANT CHANGE UP
SPECIMEN SOURCE: SIGNIFICANT CHANGE UP

## 2024-01-29 PROCEDURE — 99024 POSTOP FOLLOW-UP VISIT: CPT

## 2024-01-29 PROCEDURE — 99239 HOSP IP/OBS DSCHRG MGMT >30: CPT

## 2024-01-29 PROCEDURE — 74018 RADEX ABDOMEN 1 VIEW: CPT | Mod: 26

## 2024-01-29 PROCEDURE — 99232 SBSQ HOSP IP/OBS MODERATE 35: CPT

## 2024-01-29 RX ORDER — POLYETHYLENE GLYCOL 3350 17 G/17G
17 POWDER, FOR SOLUTION ORAL
Qty: 510 | Refills: 3
Start: 2024-01-29 | End: 2024-05-27

## 2024-01-29 RX ORDER — SIMETHICONE 80 MG/1
1 TABLET, CHEWABLE ORAL
Qty: 90 | Refills: 0
Start: 2024-01-29 | End: 2024-02-27

## 2024-01-29 RX ORDER — SENNA PLUS 8.6 MG/1
2 TABLET ORAL
Qty: 60 | Refills: 3
Start: 2024-01-29 | End: 2024-05-27

## 2024-01-29 RX ORDER — CIPROFLOXACIN LACTATE 400MG/40ML
1 VIAL (ML) INTRAVENOUS
Qty: 14 | Refills: 0
Start: 2024-01-29 | End: 2024-02-04

## 2024-01-29 RX ORDER — METRONIDAZOLE 500 MG
1 TABLET ORAL
Qty: 21 | Refills: 0
Start: 2024-01-29 | End: 2024-02-04

## 2024-01-29 RX ADMIN — Medication 500 MILLIGRAM(S): at 13:00

## 2024-01-29 RX ADMIN — Medication 500 MILLIGRAM(S): at 05:47

## 2024-01-29 RX ADMIN — Medication 1 TABLET(S): at 12:44

## 2024-01-29 RX ADMIN — SIMETHICONE 80 MILLIGRAM(S): 80 TABLET, CHEWABLE ORAL at 12:44

## 2024-01-29 RX ADMIN — SIMETHICONE 80 MILLIGRAM(S): 80 TABLET, CHEWABLE ORAL at 07:56

## 2024-01-29 RX ADMIN — Medication 1 TABLET(S): at 07:52

## 2024-01-29 RX ADMIN — Medication 500 MILLIGRAM(S): at 05:46

## 2024-01-29 NOTE — PROGRESS NOTE ADULT - TIME BILLING
50 minutes spent on total encounter; more than 50% of the visit was spent counseling and / or coordinating care by the attending physician.  The necessity of the time spent during the encounter on this date of service was due to: Coordination of care.

## 2024-01-29 NOTE — PROGRESS NOTE ADULT - SUBJECTIVE AND OBJECTIVE BOX
58yFemale  Being followed for bloody diarrhea  Interval history: Patient denies nausea, vomiting, hematemesis, melena, blood in stool, diarrhea, constipation, abdominal pain. Patient tolerating diet, reports bloody diarrhea resolved.      PAST MEDICAL & SURGICAL HISTORY:   Osteoarthritis      Diverticulitis      GERD (gastroesophageal reflux disease)      History of Clostridium difficile infection  jan 2023      History of umbilical hernia repair      History of bladder surgery      Cyst of neck                Social History: No smoking. No alcohol. No illegal drug use.            MEDICATIONS  (STANDING):  ciprofloxacin     Tablet 500 milliGRAM(s) Oral every 12 hours  lactobacillus acidophilus 1 Tablet(s) Oral three times a day with meals  metroNIDAZOLE    Tablet 500 milliGRAM(s) Oral every 8 hours  pantoprazole  Injectable 40 milliGRAM(s) IV Push daily  senna 2 Tablet(s) Oral at bedtime    MEDICATIONS  (PRN):  acetaminophen     Tablet .. 650 milliGRAM(s) Oral every 6 hours PRN Temp greater or equal to 38C (100.4F), Mild Pain (1 - 3)  ondansetron Injectable 4 milliGRAM(s) IV Push every 6 hours PRN Nausea  polyethylene glycol 3350 17 Gram(s) Oral daily PRN Constipation  simethicone 80 milliGRAM(s) Chew three times a day PRN Gas      Allergies:   aspirin (Short breath)  penicillins (Unknown)  amoxicillin (Unknown)  Grapes (Short breath)  sulfa  patient states that stitches needed to be re-opened after suture with sulfa material (Other)  erythromycin (Other)  Intolerances          REVIEW OF SYSTEMS:  General:  No weight loss, fevers, or chills.  Eyes:  No reported pain or visual changes  ENT:  No sore throat or runny nose.  NECK: No stiffness   CV:  No chest pain or palpitations.  Resp:  No shortness of breath, cough  GI:  No abdominal pain, nausea, vomiting, dysphagia, diarrhea or constipation. No rectal bleeding, melena, or hematemesis.  Muscle:  No aches or weakness  Neuro:  No tingling, numbness           VITAL SIGNS:   T(F): 96.7 (01-29-24 @ 05:34), Max: 98.2 (01-28-24 @ 20:51)  HR: 66 (01-29-24 @ 05:34) (66 - 84)  BP: 95/52 (01-29-24 @ 05:34) (95/52 - 108/58)  RR: 16 (01-29-24 @ 05:34) (16 - 18)  SpO2: 94% (01-29-24 @ 05:34) (94% - 95%)    PHYSICAL EXAM:  GENERAL: AAOx3, no acute distress.  HEAD:  Atraumatic, Normocephalic  EYES: conjunctiva and sclera clear  NECK: Supple, no JVD or thyromegaly  CHEST/LUNG: Clear to auscultation bilaterally; No wheeze, rhonchi, or rales  HEART: Regular rate and rhythm; normal S1, S2, No murmurs.  ABDOMEN: Soft, nontender, nondistended; Bowel sounds present  NEUROLOGY: No asterixis or tremor.   SKIN: Intact, no jaundice            LABS:                        12.8   5.83  )-----------( 168      ( 28 Jan 2024 04:30 )             35.9     01-28    140  |  104  |  14  ----------------------------<  102<H>  4.2   |  22  |  0.6<L>    Ca    9.1      28 Jan 2024 04:30  Mg     1.9     01-28    TPro  6.0  /  Alb  4.1  /  TBili  0.5  /  DBili  x   /  AST  17  /  ALT  19  /  AlkPhos  78  01-28    LIVER FUNCTIONS - ( 28 Jan 2024 04:30 )  Alb: 4.1 g/dL / Pro: 6.0 g/dL / ALK PHOS: 78 U/L / ALT: 19 U/L / AST: 17 U/L / GGT: x               IMAGING:    < from: CT Abdomen and Pelvis w/ IV Cont (01.25.24 @ 18:09) >    ACC: [id number] EXAM:  CT ABDOMEN AND PELVIS IC   ORDERED BY: IBRAHIMA MULLER     PROCEDURE DATE:  01/25/2024          INTERPRETATION:  CLINICAL STATEMENT: Abdominal pain.    TECHNIQUE: Contiguous axial CT images were obtained of the abdomen and   pelvisfollowing administration of intravenous contrast.  Oral contrast   was not administered. Reformatted images in the coronal and sagittal   planes were acquired.    COMPARISON CT: 2/13/2023    FINDINGS:    LOWER CHEST: Bibasilar subsegmental atelectasis.    HEPATOBILIARY: Unremarkable.    SPLEEN: Within normal limits.    ADRENALS: Within normal limits.    PANCREAS: Within normal limits.    KIDNEYS: Symmetric renal enhancement. No hydronephrosis. Subcentimeter   hypodensity too small to further characterize    ABDOMINOPELVIC NODES: No enlarged abdominal or pelvic lymph nodes.    PELVIC ORGANS: Underdistended urinary bladder, limiting evaluation.    PERITONEUM/MESENTERY/BOWEL: No bowel obstruction, ascites or free   intraperitoneal air. The appendix is unremarkable. Colonic   diverticulosis. Wall thickening and adjacent inflammatory change   involving the descending and sigmoid colon.    BONES/SOFT TISSUES: No acute osseous abnormality.Atherosclerotic vascular   calcifications.      IMPRESSION:  Wall thickening and adjacent inflammatory change involving the descending   and sigmoid colon compatible with colitis    --- End of Report ---            CLARA SERRANO MD; Attending Radiologist  This document has been electronically signed. Jan 25 2024  7:21PM    < end of copied text >

## 2024-01-29 NOTE — DISCHARGE NOTE PROVIDER - CARE PROVIDER_API CALL
Brady Paez  Internal Medicine  [address] [place] [address]  Phone: [phone]  Fax: [phone]  Follow Up Time:     Max Mckeon  Gastroenterology  [address] [place] [id number]  Phone: [phone]  Fax: [phone]  Follow Up Time:

## 2024-01-29 NOTE — DISCHARGE NOTE PROVIDER - NSDCMRMEDTOKEN_GEN_ALL_CORE_FT
Probiotic Formula:    ciprofloxacin 500 mg oral tablet: 1 tab(s) orally every 12 hours  metroNIDAZOLE 500 mg oral tablet: 1 tab(s) orally every 8 hours  polyethylene glycol 3350 oral powder for reconstitution: 17 gram(s) orally once a day as needed for Constipation  Probiotic Formula:   senna leaf extract oral tablet: 2 tab(s) orally once a day (at bedtime)  simethicone 80 mg oral tablet, chewable: 1 tab(s) orally 3 times a day as needed for Gas

## 2024-01-29 NOTE — DISCHARGE NOTE PROVIDER - NSDCCPCAREPLAN_GEN_ALL_CORE_FT
PRINCIPAL DISCHARGE DIAGNOSIS  Diagnosis: Colitis  Assessment and Plan of Treatment:       SECONDARY DISCHARGE DIAGNOSES  Diagnosis: Hematochezia  Assessment and Plan of Treatment: Likely due to diverticulosis seen on CT scan.     PRINCIPAL DISCHARGE DIAGNOSIS  Diagnosis: Colitis  Assessment and Plan of Treatment: Your CT scan of the abdomen showed colitis. Please continue your antibiotics and follow up with GI in 1-2 weeks.      SECONDARY DISCHARGE DIAGNOSES  Diagnosis: Hematochezia  Assessment and Plan of Treatment: Likely due to diverticulosis seen on CT scan. Please avoid consitpation and follow up with your PMD in 1-2 weeks.

## 2024-01-29 NOTE — ASSESSMENT
[FreeTextEntry1] : Pt is POD# 11 s/p excision of L nasojugal cyst. Doing well   - D/c sutures - All bandages changed, steri strips placed - Scar management: Once steris fall off, start Aquaphor x 2 weeks then switch to silicone based scar cream  - Daily SPF / routine derm f/us  - signs and symptoms of infection reviewed - Light activity ok, nothing strenuous for another 2 weeks - ok to shower - Path reviewed: Trichilemmal (pilar) cyst - All post op instructions reviewed, all questions answered - f/u pRn

## 2024-01-29 NOTE — DISCHARGE NOTE NURSING/CASE MANAGEMENT/SOCIAL WORK - NSDCPEFALRISK_GEN_ALL_CORE
For information on Fall & Injury Prevention, visit: [email or web address] OR  [email or web address] OR  https://www.cdc.gov/steadi/patient.html

## 2024-01-29 NOTE — DATA REVIEWED
[FreeTextEntry1] : Patient:     EFREN HONG   Accession:                             [id number]  Collected Date/Time:                   1/19/2024 14:39 EST Received Date/Time:                    1/22/2024 11:21 EST  Surgical Pathology Report - Auth (Verified)  Specimen(s) Submitted Left nasojugal cyst  Final Diagnosis Cyst, left nasojugal, excision: - Trichilemmal (pilar) cyst with focal central calcifications.  Verified by: Deanna Villa M.D. (Electronic Signature) Reported on: 01/24/24 15:20 EST, [facility], [facility], [address] [place] [address] Histology technical [place] [address] Phone: [phone]   Fax: [phone] _________________________________________________________________   Clinical Information Left nasojugal cyst  Perioperative Diagnosis D48.5-Neoplasm of uncertain behavior of skin  Gross Description Received in formalin labeled "left nasojugal cyst".  The specimen consists of 1 tan skin excision with attached cystic tissue measuring 1.5 x 0.8 x 0.3 cm.  The overlying skin measures 1.4 x 0.5 cm.  The specimen is inked, serially sectioned and entirely submitted.  Sectioning reveals a well-circumscribed cyst containing tan powdery material.  The specimen is submitted entirely.  (1 block)  01/22/2024 16:00:48 EST   LB   Disclaimer In addition to other data that may appear on the specimen containers, all labels have been inspected to confirm the presence of the patients name and date of birth.  Specimen was received and underwent gross examination at [facility], [address] [place] [address].  Ordered by: JANET KHAN IV       Collected/Examined: 19Jan2024 02:39PM       Verification Required       Stage: Final       Performed at: [facility] (Med Director: Nabil Cordero)       Resulted: 24Jan2024 03:20PM       Last Updated: 24Jan2024 03:20PM       Accession: [id number]       Results Hx:	 There are no additional results to show Details:	 To Be Done:	19Jan2024 Status:	Resulted: Requires Verification For:	Neoplasm of uncertain behavior of skin (D48.5) Overdue:	19Apr2024 02:35PM To Be Performed:	[facility] Communicated By:	Requested transmission to performing location Priority:	Routine Ordered By:	JANET KHAN IV Supervised By:	JANET KHAN IV Managed By:	JANET KHAN IV Authorization:	Not Required Performing Instructions:	 Patient Instructions:	 Order Instructions:	 Questions:	 Date/Time Collected A: 19Jan2024 Number of Sites (Enter each site description below.) A: 1 Site of Specimen A: Left nasojugal cyst Add'l Details:	 Financial Auth:	Not Needed Authorization #:	 Appt. Status:	Appointment Not Needed Effective:	19Jan2024 12:00AM Expires:	19Jan2025 12:00AM Done:	19Jan2024 02:39PM Order #:	[id number] Requisition #:	[id number] Label Type:	 Collection:	Collection Specimen Identifier:	 ID:	 CPT:	 LOINC:	 SNOMED:	 Type:	 Charges:	None Will Be Collected in Office?	No View link item history	 Goals:	None Charging:	          (none) Encounters:	 Creation:	19Jan2024 Appointment JANET KHAN IV (Plastic Surgery)  Collection:	None Specified Be Done By:	None Specified Scheduled:	None Specified Performed:	None Specified Charge :	None Specified Annotations:	          (none) Electronically Signed By: JANET KHAN IV, MD 19-Jan-2024 2:37 PM

## 2024-01-29 NOTE — DISCHARGE NOTE NURSING/CASE MANAGEMENT/SOCIAL WORK - PATIENT PORTAL LINK FT
You can access the FollowMyHealth Patient Portal offered by [facility] by registering at the following website: [email or web address]. By joining [employer]’s FollowMyHealth portal, you will also be able to view your health information using other applications (apps) compatible with our system.

## 2024-01-29 NOTE — DISCHARGE NOTE PROVIDER - HOSPITAL COURSE
57 yo female with hx of Diverticulosis, and C-diff presenting with bloody diarrhea. Admitted for further management.    # Bloody Diarrhea - resolved - now with constipation 1/28, 2/2 Colitis vs Gastroenteritis  # Diverticulosis on CT Scan   # h/o colonoscopy 5/23 with Dr. Mckeon ( + external hemorrhoids and severe diverticulosis)   - hemodynamically stable ; Hb  stable at 12 , monitor cbc , active type and screen, 2 large bore iv's   - Lactate 0.8  -C-diff negative/ GI PCR neg/ fu final stool clx   - CT Abdomen and Pelvis w/ IV Cont (01.25.24 @ 18:09) >  No bowel obstruction, ascites or free   intraperitoneal air. The appendix is unremarkable. Colonic   diverticulosis. Wall thickening and adjacent inflammatory change   involving the descending and sigmoid colon compatible with colitis   -c/w Cipro and flagyl, switched to po - patient declined new IV placement   - GI EVAL appreciated  - bowel regimen started     # Hypomagnesemia - replaced, fu repeat level     # H/O GERD- cw PPI     # dvt/ gi ppx/diet   # dispo: acute  # handoff: fu final stool clx and resolution of constipation- possible dc in 24 hrs 59 yo female with hx of Diverticulosis, and C-diff presenting with bloody diarrhea. Admitted for further management.    # Bloody Diarrhea - resolved - now with constipation 1/28, 2/2 Colitis vs Gastroenteritis  # Diverticulosis and colitis on CT Scan   # h/o colonoscopy 5/23 with Dr. Mckeon ( + external hemorrhoids and severe diverticulosis)   - hemodynamically stable ; Hb  stable at 12 , monitor cbc , active type and screen, 2 large bore iv's   - Lactate 0.8  -C-diff, GI PCR and stool clx: all negative    - CT Abdomen and Pelvis w/ IV Cont (01.25.24 @ 18:09) >  No bowel obstruction, ascites or free   intraperitoneal air. The appendix is unremarkable. Colonic   diverticulosis. Wall thickening and adjacent inflammatory change   involving the descending and sigmoid colon compatible with colitis   -c/w Cipro and flagyl, switched to po - patient declined new IV placement . D/c on po abx   - GI EVAL appreciated  - D/c on bowel regimen     Pt feels well and is stable for d/c home with po abx and GI f/u

## 2024-01-29 NOTE — DISCHARGE NOTE PROVIDER - NSDCFUSCHEDAPPT_GEN_ALL_CORE_FT
[facility] Physician Count includes the [facility]  [facility] [address] [place] [address]  Scheduled Appointment: 01/29/2024

## 2024-01-29 NOTE — PROGRESS NOTE ADULT - ASSESSMENT
58yFemale pmh GERD, diverticulosis, C-diff presents for diarrhea for a few days that turned into just blood today. Patient denies sick contacts, recent abx use    5/10/23 Colonoscopy Dr. Mckeon  Impressions:  -Polyp (5 mm) in the ascending colon. (Polypectomy).  -Severe diverticulosis of the sigmoid colon.  -Melanosis coli was noted in the left side of the colon. .  -The sigmoid colon was tortuous but the colonoscope traversed the area with  manipulation.  -External hemorrhoids    #bloody diarrhea  Wall thickening and adjacent inflammatory change involving the descending   and sigmoid colon compatible with colitis  infectious vs ischemic colitis  Rec  -C-diff Negative  -GI PCR negative   -updated patient's primary GI   -avoid hypotension  -soft diet, lactose free low residue   -Maintain Hemodynamic Stability   -Monitor CBC  -CMP,Optimize Electrolytes  -PT,PTT,INR  -EKG, Chest-Xray   -Transfuse prn to hgb >8  -Two large bore IV lines  -ppi ppx  -Monitor Vital Signs  -Monitor Stool For blood, frequency, consistency, melena  -Active Type and Screen  -Iron Studies, Folate, Vitamin B12 levels   - Follow up with our GI office located on [address] [place] [address], Phone number [phone] with Dr. Mckeon    Recall GI if needed   58yFemale pmh GERD, diverticulosis, C-diff presents for diarrhea for a few days that turned into just blood today. Patient denies sick contacts, recent abx use    5/10/23 Colonoscopy Dr. Mckeon  Impressions:  -Polyp (5 mm) in the ascending colon. (Polypectomy).  -Severe diverticulosis of the sigmoid colon.  -Melanosis coli was noted in the left side of the colon. .  -The sigmoid colon was tortuous but the colonoscope traversed the area with  manipulation.  -External hemorrhoids    #bloody diarrhea  Wall thickening and adjacent inflammatory change involving the descending   and sigmoid colon compatible with colitis  infectious vs ischemic colitis  Rec  -C-diff Negative  -GI PCR negative   -updated patient's primary GI   -avoid hypotension  -soft diet, lactose free low residue   -Maintain Hemodynamic Stability   -Monitor CBC  -CMP,Optimize Electrolytes  -PT,PTT,INR  -EKG, Chest-Xray   -Transfuse prn to hgb >8  -Two large bore IV lines  -ppi ppx  -Monitor Vital Signs  -Monitor Stool For blood, frequency, consistency, melena  -Active Type and Screen  -Iron Studies, Folate, Vitamin B12 levels   - Follow up with our GI office located on [address] [place] [address], Phone number [phone] with Dr. Mckeon

## 2024-01-29 NOTE — PHYSICAL EXAM
[de-identified] : Well developed, well nourished in NAD  [de-identified] : left nasojugal crease incision is CDI with skin edges very well approximated, no open areas or drainage. no cellulitis or erythema. Sutures in place [de-identified] : as above

## 2024-01-29 NOTE — HISTORY OF PRESENT ILLNESS
[FreeTextEntry1] : 58 yr old  woman referred for left nasojugal crease cyst  no prior tx lesion present approx 7 years prior evaluation by ENT--no intervention performed  not working  Intervval hx (1/29/24): Pt is POD# 11 s/p excision of L nasojugal cyst. Pt is doing well. Denies fever/chills/drainage. Pt on multiple abx, was just hospitalized x4 days for C.diff colitis, has h/o diverticulitis. Released this afternoon.

## 2024-01-31 LAB
CULTURE RESULTS: SIGNIFICANT CHANGE UP
CULTURE RESULTS: SIGNIFICANT CHANGE UP
SPECIMEN SOURCE: SIGNIFICANT CHANGE UP
SPECIMEN SOURCE: SIGNIFICANT CHANGE UP

## 2024-02-01 DIAGNOSIS — K57.30 DIVERTICULOSIS OF LARGE INTESTINE WITHOUT PERFORATION OR ABSCESS WITHOUT BLEEDING: ICD-10-CM

## 2024-02-01 DIAGNOSIS — Z88.0 ALLERGY STATUS TO PENICILLIN: ICD-10-CM

## 2024-02-01 DIAGNOSIS — K64.4 RESIDUAL HEMORRHOIDAL SKIN TAGS: ICD-10-CM

## 2024-02-01 DIAGNOSIS — K57.31 DIVERTICULOSIS OF LARGE INTESTINE WITHOUT PERFORATION OR ABSCESS WITH BLEEDING: ICD-10-CM

## 2024-02-01 DIAGNOSIS — K21.9 GASTRO-ESOPHAGEAL REFLUX DISEASE WITHOUT ESOPHAGITIS: ICD-10-CM

## 2024-02-01 DIAGNOSIS — K63.5 POLYP OF COLON: ICD-10-CM

## 2024-02-01 DIAGNOSIS — M19.90 UNSPECIFIED OSTEOARTHRITIS, UNSPECIFIED SITE: ICD-10-CM

## 2024-02-01 DIAGNOSIS — K52.9 NONINFECTIVE GASTROENTERITIS AND COLITIS, UNSPECIFIED: ICD-10-CM

## 2024-02-01 DIAGNOSIS — Z86.19 PERSONAL HISTORY OF OTHER INFECTIOUS AND PARASITIC DISEASES: ICD-10-CM

## 2024-02-01 DIAGNOSIS — Z88.2 ALLERGY STATUS TO SULFONAMIDES: ICD-10-CM

## 2024-02-01 DIAGNOSIS — K59.00 CONSTIPATION, UNSPECIFIED: ICD-10-CM

## 2024-02-01 DIAGNOSIS — E83.42 HYPOMAGNESEMIA: ICD-10-CM

## 2024-02-01 DIAGNOSIS — Z88.1 ALLERGY STATUS TO OTHER ANTIBIOTIC AGENTS STATUS: ICD-10-CM

## 2024-02-01 DIAGNOSIS — Z88.6 ALLERGY STATUS TO ANALGESIC AGENT: ICD-10-CM

## 2024-02-01 DIAGNOSIS — Z91.018 ALLERGY TO OTHER FOODS: ICD-10-CM

## 2024-02-15 ENCOUNTER — APPOINTMENT (OUTPATIENT)
Dept: PLASTIC SURGERY | Facility: CLINIC | Age: 59
End: 2024-02-15
Payer: COMMERCIAL

## 2024-02-15 PROCEDURE — 99024 POSTOP FOLLOW-UP VISIT: CPT

## 2024-02-15 PROCEDURE — 99212 OFFICE O/P EST SF 10 MIN: CPT

## 2024-02-15 NOTE — ASSESSMENT
[FreeTextEntry1] : Pt is s/p excision of L nasojugal cyst on 1/19/24. Doing well  - Scar management: massage and scar scream - ok to shower - Path reviewed: Trichilemmal (pilar) cyst - all questions answered  - f/u 3 months seen w/ Dr. Azar  doing very well no issues scarguard f/u 3 months

## 2024-02-15 NOTE — HISTORY OF PRESENT ILLNESS
[FreeTextEntry1] : 58 yr old woman referred for left nasojugal crease cyst  no prior tx lesion present approx 7 years prior evaluation by ENT--no intervention performed  not working  Intervval hx (1/29/24): Pt is POD# 11 s/p excision of L nasojugal cyst. Pt is doing well. Denies fever/chills/drainage. Pt on multiple abx, was just hospitalized x4 days for C.diff colitis, has h/o diverticulitis. Released this afternoon.  Interval hx 2/15/24: p excision of L nasojugal cyst on 1/19/24. doing well. started scar cream this week.

## 2024-02-28 ENCOUNTER — APPOINTMENT (OUTPATIENT)
Dept: PLASTIC SURGERY | Facility: CLINIC | Age: 59
End: 2024-02-28
Payer: COMMERCIAL

## 2024-02-28 DIAGNOSIS — L72.11 PILAR CYST: ICD-10-CM

## 2024-02-28 PROCEDURE — 99212 OFFICE O/P EST SF 10 MIN: CPT

## 2024-02-28 NOTE — HISTORY OF PRESENT ILLNESS
[FreeTextEntry1] : 58 yr old woman referred for left nasojugal crease cyst  no prior tx lesion present approx 7 years prior evaluation by ENT--no intervention performed  not working  Intervval hx (1/29/24): Pt is POD# 11 s/p excision of L nasojugal cyst. Pt is doing well. Denies fever/chills/drainage. Pt on multiple abx, was just hospitalized x4 days for C.diff colitis, has h/o diverticulitis. Released this afternoon.  Interval hx 2/15/24: p excision of L nasojugal cyst on 1/19/24. doing well. started scar cream this week.   Interval hx (2/28/24): Pt is almost 6 weeks s/p excision of left nasojugal pilar cyst. Here with concern over "new bump". Pt has started scar guard daily. Also just started wearing heavy reading glasses. Pt came in on urgent basis today due to concern. Denies f/c/drainage

## 2024-02-28 NOTE — PHYSICAL EXAM
[de-identified] : Well developed, well nourished in NAD  [de-identified] : REGINAs [de-identified] : L nasal fold scar well healing, soft, no fluid collection or signs of recurrance. incision line is cdi with no open areas. Extremely small  (0.1mm) blush red blemish with mild swelling at lateral distal aspect of incision, likely r/t irritation from reading glasses

## 2024-02-28 NOTE — ASSESSMENT
[FreeTextEntry1] : Pt is s/p excision of L nasojugal cyst on 1/19/24. Doing well  - Scar management: massage and continue scar scream - Reassurance provided, no evidence of recurrence. Pt will monitor the area and call / come back if she is concerned about recurrance  - Avoid reading glasses on the area - s/s of infection reviewed - All questions answered to pts apparent satisfaction  - F/u 3 months

## 2024-03-14 ENCOUNTER — APPOINTMENT (OUTPATIENT)
Dept: GASTROENTEROLOGY | Facility: CLINIC | Age: 59
End: 2024-03-14
Payer: COMMERCIAL

## 2024-03-14 DIAGNOSIS — R10.9 UNSPECIFIED ABDOMINAL PAIN: ICD-10-CM

## 2024-03-14 PROCEDURE — 99443: CPT

## 2024-03-14 NOTE — HISTORY OF PRESENT ILLNESS
[FreeTextEntry1] : 58-year-old female with past medical history of Colonic Polyps, diverticulosis, prior colonic perforation in 2014 from complicated diverticulitis which was responsive to conservative measures at the time, recent hx of c diff colitis s/p treatment x 2 (vanco PO x10d in1/2023 then fidaxomicin x10 d 2/2023), TA of the colon, called for f/u post hospitalization 1/25/24 to 1/29/24. She presented to the ED with bloody stools and generalized abdominal pain.

## 2024-03-15 PROBLEM — R10.9 ABDOMINAL DISCOMFORT: Status: ACTIVE | Noted: 2024-03-15

## 2024-03-16 NOTE — PHYSICAL EXAM
[de-identified] : L nasal fold scar well healing, soft, incision cdi [FreeTextEntry1] : 68M with hyperlipidemia, presenting with 8 months of b/l hand 3rd PIP joint pain, mostly on the R. 3rd finger. Denies preceding trauma or injury. Denies history of arthritis or prior diagnosed gout.   presenting hx States that he diagnosed by family member 8 months ago however hadnt tried any medications, just massages black seed oil PRN. Last week, Dr. Chun (PMD) prescribed allopurinol and prednisone end of August, however patient deferred starting the medication until seeing rheumatology (allopurinol and prednisone were not started).    Joint pain is limited to the PIP joints of both 3rd fingers the right >> left.   affects ability to flex.  today is better day.  denies AMS, warmth or redness.  Swelling is constant - doesn't occur in flares though does go up and down a bit in severity.  No trigger to the pain.  no skin hypersensitivity  Patient also reports occasionally R. wrist pain in wintertime.  Denies b/l knee, ankle, or MTP joint pain. Denies red meat, organ meat, shellfish, alcohol, or soda intake.  No recent surgeries, no trauma, is not on diuretics. No CKD history. Denies rash.   Has a family history of autoimmune disease (his wife has RA- she is also his 2nd cousin. His mother and other family members also had reported RA) Patient denies morning stiffness.   Interval Hx: Patient last in-office 10/2021  -Reports L foot pain x 3 weeks, worse in the morning. Had been routinely walking 2-3miles daily but can't do that 2/2 foot pain. Reports swelling w/ burning foot that radiates laterally. Improves when walking through the day but exacerbated by excessive walking pain. -Pain increases with eating oranges but otherwise no obvious foot triggers. -Last 06/23 A1C 6.0 -No updated thyroid imaging, hx multinodular thyroid goiter Specialists: Cardiologist, GI

## 2024-05-14 ENCOUNTER — APPOINTMENT (OUTPATIENT)
Dept: PLASTIC SURGERY | Facility: CLINIC | Age: 59
End: 2024-05-14

## 2024-06-19 NOTE — ED ADULT NURSE NOTE - NSFALLRISK_ED_ALL_ED
[FreeTextEntry1] : Randa Kahn is 32 y/o F 2 weeks s/p MDS who presents today for post-op care. BMI today is 55 and has lost 31 lbs since surgery. Patient is doing really well and denies n/v/d/ SOB, acid reflux, po intolerance, chest pain, abd pain, dizziness, fever and calf pain. On physical examination, incisions are healing well with no signs of infection. Additionally, patient states drinking 32-48 oz of water every day and is compliant with vitamins. Diet consists of protein shakes, tuna fish, soups, yogurt, apple sauce, scrambled eggs, and peanut butter. Patient will begin exercising as tolerated and has been advised to avoid lifting more than 20 lbs for at least 4 weeks post-op. Patient will meet dietician today to advance diet as per protocol and follow up in 1 month.   
No

## 2024-09-06 ENCOUNTER — APPOINTMENT (OUTPATIENT)
Dept: GASTROENTEROLOGY | Facility: CLINIC | Age: 59
End: 2024-09-06
Payer: COMMERCIAL

## 2024-09-06 DIAGNOSIS — K57.30 DIVERTICULOSIS OF LARGE INTESTINE W/OUT PERFORATION OR ABSCESS W/OUT BLEEDING: ICD-10-CM

## 2024-09-06 DIAGNOSIS — D12.6 BENIGN NEOPLASM OF COLON, UNSPECIFIED: ICD-10-CM

## 2024-09-06 PROCEDURE — 99443: CPT

## 2024-09-06 NOTE — REASON FOR VISIT
[Home] : at home, [unfilled] , at the time of the visit. [Medical Office: ([facility])___] : at the medical office located in  [Consultation] : a consultation visit

## 2024-09-06 NOTE — REASON FOR VISIT
[Home] : at home, [unfilled] , at the time of the visit. [Medical Office: ([facility])___] : at the medical office located in  [Consultation] : a consultation visit

## 2024-11-15 NOTE — PRE-ANESTHESIA EVALUATION ADULT - NSDENTALSD_ENT_ALL_CORE
[facility] called to inform that patient recently dropped off to fill adderall prescription written on 8/29/2024. [facility] informs that patient was given 31 of 30 tablets. Per [facility] they have to report the extra tablet to physician.   
missing teeth

## 2024-11-26 ENCOUNTER — APPOINTMENT (OUTPATIENT)
Dept: BREAST CENTER | Facility: CLINIC | Age: 59
End: 2024-11-26
Payer: COMMERCIAL

## 2024-11-26 VITALS
BODY MASS INDEX: 24.8 KG/M2 | HEIGHT: 63 IN | SYSTOLIC BLOOD PRESSURE: 107 MMHG | DIASTOLIC BLOOD PRESSURE: 66 MMHG | WEIGHT: 140 LBS | HEART RATE: 80 BPM

## 2024-11-26 DIAGNOSIS — N60.12 DIFFUSE CYSTIC MASTOPATHY OF RIGHT BREAST: ICD-10-CM

## 2024-11-26 DIAGNOSIS — N60.11 DIFFUSE CYSTIC MASTOPATHY OF RIGHT BREAST: ICD-10-CM

## 2024-11-26 DIAGNOSIS — N60.02 SOLITARY CYST OF LEFT BREAST: ICD-10-CM

## 2024-11-26 PROCEDURE — 99214 OFFICE O/P EST MOD 30 MIN: CPT

## 2024-12-16 ENCOUNTER — APPOINTMENT (OUTPATIENT)
Dept: OBGYN | Facility: CLINIC | Age: 59
End: 2024-12-16
Payer: COMMERCIAL

## 2024-12-16 ENCOUNTER — LABORATORY RESULT (OUTPATIENT)
Age: 59
End: 2024-12-16

## 2024-12-16 VITALS
SYSTOLIC BLOOD PRESSURE: 105 MMHG | HEIGHT: 63 IN | BODY MASS INDEX: 24.8 KG/M2 | DIASTOLIC BLOOD PRESSURE: 72 MMHG | WEIGHT: 140 LBS | HEART RATE: 86 BPM

## 2024-12-16 DIAGNOSIS — Z80.41 FAMILY HISTORY OF MALIGNANT NEOPLASM OF OVARY: ICD-10-CM

## 2024-12-16 DIAGNOSIS — Z01.419 ENCOUNTER FOR GYNECOLOGICAL EXAMINATION (GENERAL) (ROUTINE) W/OUT ABNORMAL FINDINGS: ICD-10-CM

## 2024-12-16 LAB
BILIRUB UR QL STRIP: NEGATIVE
CLARITY UR: NORMAL
COLLECTION METHOD: NORMAL
GLUCOSE UR-MCNC: NEGATIVE
HCG UR QL: 0.2 EU/DL
HGB UR QL STRIP.AUTO: NEGATIVE
KETONES UR-MCNC: NEGATIVE
LEUKOCYTE ESTERASE UR QL STRIP: ABNORMAL
NITRITE UR QL STRIP: NEGATIVE
PH UR STRIP: 5.5
PROT UR STRIP-MCNC: NEGATIVE
SP GR UR STRIP: >=1.03

## 2024-12-16 PROCEDURE — 99459 PELVIC EXAMINATION: CPT

## 2024-12-16 PROCEDURE — 81003 URINALYSIS AUTO W/O SCOPE: CPT | Mod: QW

## 2024-12-16 PROCEDURE — 99396 PREV VISIT EST AGE 40-64: CPT | Mod: 25

## 2024-12-18 LAB
C TRACH RRNA SPEC QL NAA+PROBE: NOT DETECTED
HPV HIGH+LOW RISK DNA PNL CVX: DETECTED
N GONORRHOEA RRNA SPEC QL NAA+PROBE: NOT DETECTED
SOURCE TP AMPLIFICATION: NORMAL

## 2024-12-27 LAB — CYTOLOGY CVX/VAG DOC THIN PREP: NORMAL

## 2025-01-02 ENCOUNTER — NON-APPOINTMENT (OUTPATIENT)
Age: 60
End: 2025-01-02

## 2025-01-04 ENCOUNTER — NON-APPOINTMENT (OUTPATIENT)
Age: 60
End: 2025-01-04

## 2025-01-04 DIAGNOSIS — R87.810 CERVICAL HIGH RISK HUMAN PAPILLOMAVIRUS (HPV) DNA TEST POSITIVE: ICD-10-CM

## 2025-01-21 ENCOUNTER — APPOINTMENT (OUTPATIENT)
Dept: OBGYN | Facility: CLINIC | Age: 60
End: 2025-01-21
Payer: COMMERCIAL

## 2025-01-21 VITALS
BODY MASS INDEX: 24.1 KG/M2 | OXYGEN SATURATION: 98 % | DIASTOLIC BLOOD PRESSURE: 77 MMHG | WEIGHT: 136 LBS | SYSTOLIC BLOOD PRESSURE: 121 MMHG | TEMPERATURE: 97.7 F | HEART RATE: 86 BPM | HEIGHT: 63 IN

## 2025-01-21 DIAGNOSIS — R87.810 CERVICAL HIGH RISK HUMAN PAPILLOMAVIRUS (HPV) DNA TEST POSITIVE: ICD-10-CM

## 2025-01-21 PROCEDURE — 57454 BX/CURETT OF CERVIX W/SCOPE: CPT

## 2025-01-22 ENCOUNTER — NON-APPOINTMENT (OUTPATIENT)
Age: 60
End: 2025-01-22

## 2025-01-27 ENCOUNTER — NON-APPOINTMENT (OUTPATIENT)
Age: 60
End: 2025-01-27

## 2025-01-28 ENCOUNTER — NON-APPOINTMENT (OUTPATIENT)
Age: 60
End: 2025-01-28

## 2025-01-28 LAB — CORE LAB BIOPSY: NORMAL

## 2025-03-20 ENCOUNTER — LABORATORY RESULT (OUTPATIENT)
Age: 60
End: 2025-03-20

## 2025-03-20 ENCOUNTER — APPOINTMENT (OUTPATIENT)
Dept: OBGYN | Facility: CLINIC | Age: 60
End: 2025-03-20
Payer: COMMERCIAL

## 2025-03-20 VITALS
WEIGHT: 136 LBS | SYSTOLIC BLOOD PRESSURE: 115 MMHG | HEIGHT: 63 IN | HEART RATE: 79 BPM | DIASTOLIC BLOOD PRESSURE: 76 MMHG | BODY MASS INDEX: 24.1 KG/M2

## 2025-03-20 DIAGNOSIS — N90.89 OTHER SPECIFIED NONINFLAMMATORY DISORDERS OF VULVA AND PERINEUM: ICD-10-CM

## 2025-03-20 DIAGNOSIS — R82.998 OTHER ABNORMAL FINDINGS IN URINE: ICD-10-CM

## 2025-03-20 LAB
BILIRUB UR QL STRIP: NEGATIVE
CLARITY UR: CLEAR
COLLECTION METHOD: NORMAL
GLUCOSE UR-MCNC: NEGATIVE
HCG UR QL: 0.2 EU/DL
HGB UR QL STRIP.AUTO: NEGATIVE
KETONES UR-MCNC: NEGATIVE
LEUKOCYTE ESTERASE UR QL STRIP: ABNORMAL
NITRITE UR QL STRIP: NEGATIVE
PH UR STRIP: 5.5
PROT UR STRIP-MCNC: NEGATIVE
SP GR UR STRIP: >=1.03

## 2025-03-20 PROCEDURE — 99213 OFFICE O/P EST LOW 20 MIN: CPT | Mod: 25

## 2025-03-20 PROCEDURE — 99459 PELVIC EXAMINATION: CPT | Mod: NC

## 2025-03-20 PROCEDURE — 81003 URINALYSIS AUTO W/O SCOPE: CPT | Mod: QW

## 2025-03-24 LAB
APPEARANCE: CLEAR
BACTERIA UR CULT: NORMAL
BILIRUBIN URINE: NEGATIVE
BLOOD URINE: NEGATIVE
COLOR: YELLOW
GLUCOSE QUALITATIVE U: NEGATIVE MG/DL
HSV+VZV DNA SPEC QL NAA+PROBE: NOT DETECTED
KETONES URINE: NEGATIVE MG/DL
LEUKOCYTE ESTERASE URINE: ABNORMAL
NITRITE URINE: NEGATIVE
PH URINE: 5.5
PROTEIN URINE: NEGATIVE MG/DL
SPECIFIC GRAVITY URINE: 1.03
SPECIMEN SOURCE: NORMAL
UROBILINOGEN URINE: 0.2 MG/DL

## 2025-04-29 ENCOUNTER — NON-APPOINTMENT (OUTPATIENT)
Age: 60
End: 2025-04-29

## 2025-05-01 ENCOUNTER — APPOINTMENT (OUTPATIENT)
Dept: OBGYN | Facility: CLINIC | Age: 60
End: 2025-05-01
Payer: COMMERCIAL

## 2025-05-01 VITALS
DIASTOLIC BLOOD PRESSURE: 77 MMHG | HEART RATE: 74 BPM | BODY MASS INDEX: 24.1 KG/M2 | HEIGHT: 63 IN | SYSTOLIC BLOOD PRESSURE: 117 MMHG | WEIGHT: 136 LBS

## 2025-05-01 DIAGNOSIS — N81.4 UTEROVAGINAL PROLAPSE, UNSPECIFIED: ICD-10-CM

## 2025-05-01 PROCEDURE — 99214 OFFICE O/P EST MOD 30 MIN: CPT

## 2025-05-01 PROCEDURE — 99459 PELVIC EXAMINATION: CPT | Mod: NC

## 2025-05-08 ENCOUNTER — NON-APPOINTMENT (OUTPATIENT)
Age: 60
End: 2025-05-08

## 2025-05-12 ENCOUNTER — NON-APPOINTMENT (OUTPATIENT)
Age: 60
End: 2025-05-12

## 2025-05-14 ENCOUNTER — APPOINTMENT (OUTPATIENT)
Dept: OBGYN | Facility: CLINIC | Age: 60
End: 2025-05-14
Payer: COMMERCIAL

## 2025-05-14 VITALS — HEART RATE: 94 BPM | HEIGHT: 63 IN | DIASTOLIC BLOOD PRESSURE: 79 MMHG | SYSTOLIC BLOOD PRESSURE: 114 MMHG

## 2025-05-14 DIAGNOSIS — R39.15 URGENCY OF URINATION: ICD-10-CM

## 2025-05-14 DIAGNOSIS — N81.4 UTEROVAGINAL PROLAPSE, UNSPECIFIED: ICD-10-CM

## 2025-05-14 DIAGNOSIS — R87.810 CERVICAL HIGH RISK HUMAN PAPILLOMAVIRUS (HPV) DNA TEST POSITIVE: ICD-10-CM

## 2025-05-14 PROCEDURE — 99459 PELVIC EXAMINATION: CPT | Mod: NC

## 2025-05-14 PROCEDURE — 99214 OFFICE O/P EST MOD 30 MIN: CPT

## 2025-05-14 PROCEDURE — 76830 TRANSVAGINAL US NON-OB: CPT

## 2025-05-20 ENCOUNTER — NON-APPOINTMENT (OUTPATIENT)
Age: 60
End: 2025-05-20

## 2025-05-28 ENCOUNTER — APPOINTMENT (OUTPATIENT)
Dept: UROGYNECOLOGY | Facility: CLINIC | Age: 60
End: 2025-05-28
Payer: COMMERCIAL

## 2025-05-28 ENCOUNTER — LABORATORY RESULT (OUTPATIENT)
Age: 60
End: 2025-05-28

## 2025-05-28 ENCOUNTER — NON-APPOINTMENT (OUTPATIENT)
Age: 60
End: 2025-05-28

## 2025-05-28 VITALS
BODY MASS INDEX: 24.8 KG/M2 | HEART RATE: 97 BPM | DIASTOLIC BLOOD PRESSURE: 69 MMHG | SYSTOLIC BLOOD PRESSURE: 102 MMHG | HEIGHT: 63 IN | WEIGHT: 140 LBS

## 2025-05-28 DIAGNOSIS — Z87.42 PERSONAL HISTORY OF OTHER DISEASES OF THE FEMALE GENITAL TRACT: ICD-10-CM

## 2025-05-28 DIAGNOSIS — R19.7 DIARRHEA, UNSPECIFIED: ICD-10-CM

## 2025-05-28 DIAGNOSIS — Z87.898 PERSONAL HISTORY OF OTHER SPECIFIED CONDITIONS: ICD-10-CM

## 2025-05-28 DIAGNOSIS — Z12.39 ENCOUNTER FOR OTHER SCREENING FOR MALIGNANT NEOPLASM OF BREAST: ICD-10-CM

## 2025-05-28 DIAGNOSIS — Z87.448 PERSONAL HISTORY OF OTHER DISEASES OF URINARY SYSTEM: ICD-10-CM

## 2025-05-28 DIAGNOSIS — N90.89 OTHER SPECIFIED NONINFLAMMATORY DISORDERS OF VULVA AND PERINEUM: ICD-10-CM

## 2025-05-28 DIAGNOSIS — Z87.440 PERSONAL HISTORY OF URINARY (TRACT) INFECTIONS: ICD-10-CM

## 2025-05-28 DIAGNOSIS — N64.89 OTHER SPECIFIED DISORDERS OF BREAST: ICD-10-CM

## 2025-05-28 DIAGNOSIS — R10.9 UNSPECIFIED ABDOMINAL PAIN: ICD-10-CM

## 2025-05-28 DIAGNOSIS — N76.2 ACUTE VULVITIS: ICD-10-CM

## 2025-05-28 DIAGNOSIS — M67.449 GANGLION, UNSPECIFIED HAND: ICD-10-CM

## 2025-05-28 DIAGNOSIS — Z86.03 PERSONAL HISTORY OF NEOPLASM OF UNCERTAIN BEHAVIOR: ICD-10-CM

## 2025-05-28 DIAGNOSIS — K57.20 DIVERTICULITIS OF LARGE INTESTINE WITH PERFORATION AND ABSCESS W/OUT BLEEDING: ICD-10-CM

## 2025-05-28 DIAGNOSIS — N87.0 MILD CERVICAL DYSPLASIA: ICD-10-CM

## 2025-05-28 DIAGNOSIS — D49.59 NEOPLASM UNSPECIFIED BEHAVIOR OF OTHER GENITOURINARY ORGAN: ICD-10-CM

## 2025-05-28 DIAGNOSIS — Z78.9 OTHER SPECIFIED HEALTH STATUS: ICD-10-CM

## 2025-05-28 DIAGNOSIS — R87.810 CERVICAL HIGH RISK HUMAN PAPILLOMAVIRUS (HPV) DNA TEST POSITIVE: ICD-10-CM

## 2025-05-28 DIAGNOSIS — Z91.89 OTHER SPECIFIED PERSONAL RISK FACTORS, NOT ELSEWHERE CLASSIFIED: ICD-10-CM

## 2025-05-28 DIAGNOSIS — Z63.5 DISRUPTION OF FAMILY BY SEPARATION AND DIVORCE: ICD-10-CM

## 2025-05-28 DIAGNOSIS — Z72.51 HIGH RISK HETEROSEXUAL BEHAVIOR: ICD-10-CM

## 2025-05-28 DIAGNOSIS — N81.11 CYSTOCELE, MIDLINE: ICD-10-CM

## 2025-05-28 DIAGNOSIS — R82.998 OTHER ABNORMAL FINDINGS IN URINE: ICD-10-CM

## 2025-05-28 DIAGNOSIS — Z01.419 ENCOUNTER FOR GYNECOLOGICAL EXAMINATION (GENERAL) (ROUTINE) W/OUT ABNORMAL FINDINGS: ICD-10-CM

## 2025-05-28 DIAGNOSIS — Z12.11 ENCOUNTER FOR SCREENING FOR MALIGNANT NEOPLASM OF COLON: ICD-10-CM

## 2025-05-28 DIAGNOSIS — R10.2 PELVIC AND PERINEAL PAIN: ICD-10-CM

## 2025-05-28 DIAGNOSIS — L72.11 PILAR CYST: ICD-10-CM

## 2025-05-28 DIAGNOSIS — K57.30 DIVERTICULOSIS OF LARGE INTESTINE W/OUT PERFORATION OR ABSCESS W/OUT BLEEDING: ICD-10-CM

## 2025-05-28 DIAGNOSIS — N90.7 VULVAR CYST: ICD-10-CM

## 2025-05-28 PROBLEM — N39.46 MIXED INCONTINENCE: Status: ACTIVE | Noted: 2025-05-28

## 2025-05-28 PROBLEM — M79.18 MYALGIA OF PELVIC FLOOR: Status: ACTIVE | Noted: 2025-05-28

## 2025-05-28 PROBLEM — N81.2 UTEROVAGINAL PROLAPSE, INCOMPLETE: Status: ACTIVE | Noted: 2025-05-28

## 2025-05-28 PROCEDURE — 99215 OFFICE O/P EST HI 40 MIN: CPT | Mod: 25

## 2025-05-28 PROCEDURE — 99205 OFFICE O/P NEW HI 60 MIN: CPT | Mod: 25

## 2025-05-28 PROCEDURE — 51701 INSERT BLADDER CATHETER: CPT

## 2025-05-28 PROCEDURE — 99459 PELVIC EXAMINATION: CPT | Mod: NC

## 2025-05-28 RX ORDER — CYCLOBENZAPRINE 10 MG/1
10 TABLET ORAL
Qty: 30 | Refills: 2 | Status: ACTIVE | COMMUNITY
Start: 2025-05-28 | End: 1900-01-01

## 2025-05-28 SDOH — SOCIAL STABILITY - SOCIAL INSECURITY: DISRUPTION OF FAMILY BY SEPARATION AND DIVORCE: Z63.5

## 2025-06-01 LAB — URINE CULTURE <10: NORMAL

## 2025-06-03 ENCOUNTER — RESULT CHARGE (OUTPATIENT)
Age: 60
End: 2025-06-03

## 2025-06-03 ENCOUNTER — APPOINTMENT (OUTPATIENT)
Dept: UROGYNECOLOGY | Facility: CLINIC | Age: 60
End: 2025-06-03
Payer: COMMERCIAL

## 2025-06-03 VITALS
HEIGHT: 63 IN | HEART RATE: 89 BPM | SYSTOLIC BLOOD PRESSURE: 95 MMHG | WEIGHT: 140 LBS | DIASTOLIC BLOOD PRESSURE: 63 MMHG | BODY MASS INDEX: 24.8 KG/M2

## 2025-06-03 PROCEDURE — 51729 CYSTOMETROGRAM W/VP&UP: CPT

## 2025-06-03 PROCEDURE — 51741 ELECTRO-UROFLOWMETRY FIRST: CPT

## 2025-06-03 PROCEDURE — 51784 ANAL/URINARY MUSCLE STUDY: CPT

## 2025-06-03 PROCEDURE — 81003 URINALYSIS AUTO W/O SCOPE: CPT | Mod: QW

## 2025-06-03 PROCEDURE — 51797 INTRAABDOMINAL PRESSURE TEST: CPT

## 2025-06-04 ENCOUNTER — APPOINTMENT (OUTPATIENT)
Dept: UROGYNECOLOGY | Facility: CLINIC | Age: 60
End: 2025-06-04
Payer: COMMERCIAL

## 2025-06-04 VITALS
SYSTOLIC BLOOD PRESSURE: 114 MMHG | WEIGHT: 140 LBS | BODY MASS INDEX: 24.8 KG/M2 | HEART RATE: 91 BPM | DIASTOLIC BLOOD PRESSURE: 76 MMHG | HEIGHT: 63 IN

## 2025-06-04 DIAGNOSIS — M79.18 MYALGIA, OTHER SITE: ICD-10-CM

## 2025-06-04 DIAGNOSIS — N39.46 MIXED INCONTINENCE: ICD-10-CM

## 2025-06-04 DIAGNOSIS — N81.2 INCOMPLETE UTEROVAGINAL PROLAPSE: ICD-10-CM

## 2025-06-04 PROCEDURE — 99459 PELVIC EXAMINATION: CPT | Mod: NC

## 2025-06-04 PROCEDURE — 99215 OFFICE O/P EST HI 40 MIN: CPT | Mod: 25

## 2025-06-04 PROCEDURE — 81003 URINALYSIS AUTO W/O SCOPE: CPT | Mod: QW

## 2025-06-09 ENCOUNTER — APPOINTMENT (OUTPATIENT)
Dept: OBGYN | Facility: CLINIC | Age: 60
End: 2025-06-09

## 2025-06-09 ENCOUNTER — NON-APPOINTMENT (OUTPATIENT)
Age: 60
End: 2025-06-09

## 2025-06-17 ENCOUNTER — APPOINTMENT (OUTPATIENT)
Dept: UROGYNECOLOGY | Facility: CLINIC | Age: 60
End: 2025-06-17
Payer: COMMERCIAL

## 2025-06-17 VITALS
WEIGHT: 140 LBS | DIASTOLIC BLOOD PRESSURE: 66 MMHG | HEART RATE: 90 BPM | HEIGHT: 63 IN | BODY MASS INDEX: 24.8 KG/M2 | SYSTOLIC BLOOD PRESSURE: 97 MMHG

## 2025-06-17 PROBLEM — N95.0 POST-MENOPAUSE BLEEDING: Status: ACTIVE | Noted: 2025-06-17

## 2025-06-17 PROCEDURE — 99213 OFFICE O/P EST LOW 20 MIN: CPT

## 2025-06-17 PROCEDURE — G2211 COMPLEX E/M VISIT ADD ON: CPT | Mod: NC

## 2025-06-17 PROCEDURE — 99459 PELVIC EXAMINATION: CPT | Mod: NC

## 2025-06-27 ENCOUNTER — APPOINTMENT (OUTPATIENT)
Dept: OBGYN | Facility: HOSPITAL | Age: 60
End: 2025-06-27

## 2025-07-01 ENCOUNTER — OUTPATIENT (OUTPATIENT)
Dept: OUTPATIENT SERVICES | Facility: HOSPITAL | Age: 60
LOS: 1 days | End: 2025-07-01
Payer: COMMERCIAL

## 2025-07-01 VITALS
HEART RATE: 92 BPM | WEIGHT: 139.99 LBS | RESPIRATION RATE: 18 BRPM | OXYGEN SATURATION: 98 % | SYSTOLIC BLOOD PRESSURE: 120 MMHG | TEMPERATURE: 98 F | HEIGHT: 63 IN | DIASTOLIC BLOOD PRESSURE: 88 MMHG

## 2025-07-01 DIAGNOSIS — Z98.890 OTHER SPECIFIED POSTPROCEDURAL STATES: Chronic | ICD-10-CM

## 2025-07-01 DIAGNOSIS — L72.3 SEBACEOUS CYST: Chronic | ICD-10-CM

## 2025-07-01 DIAGNOSIS — Z01.818 ENCOUNTER FOR OTHER PREPROCEDURAL EXAMINATION: ICD-10-CM

## 2025-07-01 DIAGNOSIS — N81.2 INCOMPLETE UTEROVAGINAL PROLAPSE: ICD-10-CM

## 2025-07-01 LAB
ALBUMIN SERPL ELPH-MCNC: 4.6 G/DL — SIGNIFICANT CHANGE UP (ref 3.5–5.2)
ALP SERPL-CCNC: 111 U/L — SIGNIFICANT CHANGE UP (ref 30–115)
ALT FLD-CCNC: 14 U/L — SIGNIFICANT CHANGE UP (ref 0–41)
ANION GAP SERPL CALC-SCNC: 10 MMOL/L — SIGNIFICANT CHANGE UP (ref 7–14)
APPEARANCE UR: CLEAR — SIGNIFICANT CHANGE UP
AST SERPL-CCNC: 15 U/L — SIGNIFICANT CHANGE UP (ref 0–41)
BACTERIA # UR AUTO: ABNORMAL /HPF
BASOPHILS # BLD AUTO: 0.03 K/UL — SIGNIFICANT CHANGE UP (ref 0–0.2)
BASOPHILS NFR BLD AUTO: 0.5 % — SIGNIFICANT CHANGE UP (ref 0–1)
BILIRUB SERPL-MCNC: <0.2 MG/DL — SIGNIFICANT CHANGE UP (ref 0.2–1.2)
BILIRUB UR-MCNC: NEGATIVE — SIGNIFICANT CHANGE UP
BLD GP AB SCN SERPL QL: SIGNIFICANT CHANGE UP
BUN SERPL-MCNC: 25 MG/DL — HIGH (ref 10–20)
CALCIUM SERPL-MCNC: 9.7 MG/DL — SIGNIFICANT CHANGE UP (ref 8.4–10.5)
CHLORIDE SERPL-SCNC: 104 MMOL/L — SIGNIFICANT CHANGE UP (ref 98–110)
CO2 SERPL-SCNC: 28 MMOL/L — SIGNIFICANT CHANGE UP (ref 17–32)
COLOR SPEC: YELLOW — SIGNIFICANT CHANGE UP
CREAT SERPL-MCNC: 0.7 MG/DL — SIGNIFICANT CHANGE UP (ref 0.7–1.5)
DIFF PNL FLD: NEGATIVE — SIGNIFICANT CHANGE UP
EGFR: 99 ML/MIN/1.73M2 — SIGNIFICANT CHANGE UP
EGFR: 99 ML/MIN/1.73M2 — SIGNIFICANT CHANGE UP
EOSINOPHIL # BLD AUTO: 0.21 K/UL — SIGNIFICANT CHANGE UP (ref 0–0.7)
EOSINOPHIL NFR BLD AUTO: 3.3 % — SIGNIFICANT CHANGE UP (ref 0–8)
EPI CELLS # UR: PRESENT
GLUCOSE SERPL-MCNC: 89 MG/DL — SIGNIFICANT CHANGE UP (ref 70–99)
GLUCOSE UR QL: NEGATIVE MG/DL — SIGNIFICANT CHANGE UP
HCT VFR BLD CALC: 41.3 % — SIGNIFICANT CHANGE UP (ref 37–47)
HGB BLD-MCNC: 13.9 G/DL — SIGNIFICANT CHANGE UP (ref 12–16)
HYALINE CASTS # UR AUTO: 0 /LPF — SIGNIFICANT CHANGE UP (ref 0–4)
IMM GRANULOCYTES NFR BLD AUTO: 0.2 % — SIGNIFICANT CHANGE UP (ref 0.1–0.3)
KETONES UR QL: NEGATIVE MG/DL — SIGNIFICANT CHANGE UP
LEUKOCYTE ESTERASE UR-ACNC: ABNORMAL
LYMPHOCYTES # BLD AUTO: 1.17 K/UL — LOW (ref 1.2–3.4)
LYMPHOCYTES # BLD AUTO: 18.3 % — LOW (ref 20.5–51.1)
MCHC RBC-ENTMCNC: 30 PG — SIGNIFICANT CHANGE UP (ref 27–31)
MCHC RBC-ENTMCNC: 33.7 G/DL — SIGNIFICANT CHANGE UP (ref 32–37)
MCV RBC AUTO: 89 FL — SIGNIFICANT CHANGE UP (ref 81–99)
MONOCYTES # BLD AUTO: 0.54 K/UL — SIGNIFICANT CHANGE UP (ref 0.1–0.6)
MONOCYTES NFR BLD AUTO: 8.5 % — SIGNIFICANT CHANGE UP (ref 1.7–9.3)
NEUTROPHILS # BLD AUTO: 4.43 K/UL — SIGNIFICANT CHANGE UP (ref 1.4–6.5)
NEUTROPHILS NFR BLD AUTO: 69.2 % — SIGNIFICANT CHANGE UP (ref 42.2–75.2)
NITRITE UR-MCNC: NEGATIVE — SIGNIFICANT CHANGE UP
NRBC BLD AUTO-RTO: 0 /100 WBCS — SIGNIFICANT CHANGE UP (ref 0–0)
PH UR: 5.5 — SIGNIFICANT CHANGE UP (ref 5–8)
PLATELET # BLD AUTO: 232 K/UL — SIGNIFICANT CHANGE UP (ref 130–400)
PMV BLD: 12.1 FL — HIGH (ref 7.4–10.4)
POTASSIUM SERPL-MCNC: 4.6 MMOL/L — SIGNIFICANT CHANGE UP (ref 3.5–5)
POTASSIUM SERPL-SCNC: 4.6 MMOL/L — SIGNIFICANT CHANGE UP (ref 3.5–5)
PROT SERPL-MCNC: 6.9 G/DL — SIGNIFICANT CHANGE UP (ref 6–8)
PROT UR-MCNC: NEGATIVE MG/DL — SIGNIFICANT CHANGE UP
RBC # BLD: 4.64 M/UL — SIGNIFICANT CHANGE UP (ref 4.2–5.4)
RBC # FLD: 12.4 % — SIGNIFICANT CHANGE UP (ref 11.5–14.5)
RBC CASTS # UR COMP ASSIST: 1 /HPF — SIGNIFICANT CHANGE UP (ref 0–4)
SODIUM SERPL-SCNC: 142 MMOL/L — SIGNIFICANT CHANGE UP (ref 135–146)
SP GR SPEC: 1.03 — SIGNIFICANT CHANGE UP (ref 1–1.03)
UROBILINOGEN FLD QL: 0.2 MG/DL — SIGNIFICANT CHANGE UP (ref 0.2–1)
WBC # BLD: 6.39 K/UL — SIGNIFICANT CHANGE UP (ref 4.8–10.8)
WBC # FLD AUTO: 6.39 K/UL — SIGNIFICANT CHANGE UP (ref 4.8–10.8)
WBC UR QL: 150 /HPF — HIGH (ref 0–5)

## 2025-07-01 PROCEDURE — 36415 COLL VENOUS BLD VENIPUNCTURE: CPT

## 2025-07-01 PROCEDURE — 85025 COMPLETE CBC W/AUTO DIFF WBC: CPT

## 2025-07-01 PROCEDURE — 80053 COMPREHEN METABOLIC PANEL: CPT

## 2025-07-01 PROCEDURE — 81001 URINALYSIS AUTO W/SCOPE: CPT

## 2025-07-01 PROCEDURE — 86900 BLOOD TYPING SEROLOGIC ABO: CPT

## 2025-07-01 PROCEDURE — 86850 RBC ANTIBODY SCREEN: CPT

## 2025-07-01 PROCEDURE — 86901 BLOOD TYPING SEROLOGIC RH(D): CPT

## 2025-07-01 PROCEDURE — 87086 URINE CULTURE/COLONY COUNT: CPT

## 2025-07-01 PROCEDURE — 99214 OFFICE O/P EST MOD 30 MIN: CPT | Mod: 25

## 2025-07-01 NOTE — H&P PST ADULT - REASON FOR ADMISSION
Case Type: OP Block TimeSuite: OR VictorProceduralist: Danica Owusu  Confirmed Surgery DateTime: 07- - 0:00PAST DateTime: 07- - 17:15Procedure: EXAM UNDER ANESTHESIA, ROBOTIC HYSTERECTOMY, BILATERAL SALPINGO OOPHORECTOMY, SACROCOLPOPEXY, POSSIBE POSTERIOR COLPORRHAPHY, PERINEORHAPHY, CYSTOSCOPY, ALL INDICATED RPOCEDURES.  ERP?: NoLaterality: N/ALength of Procedure: 360 Minutes  Anesthesia Type: General

## 2025-07-01 NOTE — H&P PST ADULT - HISTORY OF PRESENT ILLNESS
PATIENT CURRENTLY DENIES CHEST PAIN  SHORTNESS OF BREATH  PALPITATIONS,  DYSURIA, OR UPPER RESPIRATORY INFECTION IN PAST 2 WEEKS  59 years old female with uterine prolapse, patient is here for the scheduled surgery above.    Denies travel outside the [place] in the past 30 days  Patient denies any signs or symptoms of COVID 19 and denies contact with known positive individuals.         Anesthesia Alert  YES--Difficult Airway  CLASS IV  NO--History of neck surgery or radiation  NO--Limited ROM of neck  NO--History of Malignant hyperthermia  NO--No personal or family history of Pseudocholinesterase deficiency.  NO--Prior Anesthesia Complication  NO--Latex Allergy  NO--Loose teeth  NO--History of Rheumatoid Arthritis  NO--Bleeding risk  NO--JEAN-CLAUDE  NO--Other_____    DASI  9.89    RCRI  0    PT DENIES ANY RASHES, ABRASION, OR OPEN WOUNDS OR CUTS    AS PER THE PT, THIS IS HIS/HER COMPLETE MEDICAL AND SURGICAL HX, INCLUDING MEDICATIONS PRESCRIBED AND OVER THE COUNTER    Patient/Guardian understands the instructions and was given the opportunity to ask questions and have them answered.    pt denies any suicidal ideation or thoughts, pt states not a threat to self or others

## 2025-07-02 DIAGNOSIS — Z01.818 ENCOUNTER FOR OTHER PREPROCEDURAL EXAMINATION: ICD-10-CM

## 2025-07-03 PROBLEM — R92.30 DENSE BREAST: Status: RESOLVED | Noted: 2022-11-07 | Resolved: 2025-07-03

## 2025-07-03 PROBLEM — Z86.19 HISTORY OF CLOSTRIDIOIDES DIFFICILE COLITIS: Status: RESOLVED | Noted: 2023-03-09 | Resolved: 2025-07-03

## 2025-07-03 LAB
CULTURE RESULTS: SIGNIFICANT CHANGE UP
SPECIMEN SOURCE: SIGNIFICANT CHANGE UP

## 2025-07-07 NOTE — ASU PATIENT PROFILE, ADULT - FALL HARM RISK - UNIVERSAL INTERVENTIONS
Bed in lowest position, wheels locked, appropriate side rails in place/Call bell, personal items and telephone in reach/Instruct patient to call for assistance before getting out of bed or chair/Non-slip footwear when patient is out of bed/[place] to call system/Physically safe environment - no spills, clutter or unnecessary equipment/Purposeful Proactive Rounding/Room/bathroom lighting operational, light cord in reach

## 2025-07-07 NOTE — ASU PATIENT PROFILE, ADULT - AS SC BRADEN FRICTION
Detail Level: Detailed Add 1585x Cpt? (Do Not Bill If You Billed For The Procedure Placing The Sutures. This Is An Add-On Code That Must Be Billed With An E/M Visit Code): No (3) no apparent problem

## 2025-07-08 ENCOUNTER — RESULT REVIEW (OUTPATIENT)
Age: 60
End: 2025-07-08

## 2025-07-08 ENCOUNTER — OUTPATIENT (OUTPATIENT)
Dept: OUTPATIENT SERVICES | Facility: HOSPITAL | Age: 60
LOS: 1 days | Discharge: HOME | End: 2025-07-08
Payer: COMMERCIAL

## 2025-07-08 ENCOUNTER — TRANSCRIPTION ENCOUNTER (OUTPATIENT)
Age: 60
End: 2025-07-08

## 2025-07-08 VITALS
HEIGHT: 63 IN | RESPIRATION RATE: 18 BRPM | DIASTOLIC BLOOD PRESSURE: 58 MMHG | WEIGHT: 139.99 LBS | OXYGEN SATURATION: 97 % | HEART RATE: 87 BPM | SYSTOLIC BLOOD PRESSURE: 107 MMHG | TEMPERATURE: 98 F

## 2025-07-08 VITALS
SYSTOLIC BLOOD PRESSURE: 133 MMHG | DIASTOLIC BLOOD PRESSURE: 69 MMHG | RESPIRATION RATE: 18 BRPM | HEART RATE: 97 BPM | OXYGEN SATURATION: 99 %

## 2025-07-08 DIAGNOSIS — Z98.890 OTHER SPECIFIED POSTPROCEDURAL STATES: Chronic | ICD-10-CM

## 2025-07-08 DIAGNOSIS — L72.3 SEBACEOUS CYST: Chronic | ICD-10-CM

## 2025-07-08 DIAGNOSIS — N81.2 INCOMPLETE UTEROVAGINAL PROLAPSE: ICD-10-CM

## 2025-07-08 PROCEDURE — S2900: CPT

## 2025-07-08 PROCEDURE — C1889: CPT

## 2025-07-08 PROCEDURE — C9399: CPT

## 2025-07-08 PROCEDURE — 58571 TLH W/T/O 250 G OR LESS: CPT | Mod: 22

## 2025-07-08 PROCEDURE — C1781: CPT

## 2025-07-08 PROCEDURE — 88305 TISSUE EXAM BY PATHOLOGIST: CPT | Mod: 26

## 2025-07-08 PROCEDURE — 57425 LAPAROSCOPY SURG COLPOPEXY: CPT

## 2025-07-08 RX ORDER — ONDANSETRON HCL/PF 4 MG/2 ML
4 VIAL (ML) INJECTION ONCE
Refills: 0 | Status: COMPLETED | OUTPATIENT
Start: 2025-07-08 | End: 2025-07-08

## 2025-07-08 RX ORDER — APREPITANT 40 MG/1
40 CAPSULE ORAL ONCE
Refills: 0 | Status: DISCONTINUED | OUTPATIENT
Start: 2025-07-08 | End: 2025-07-08

## 2025-07-08 RX ORDER — ACETAMINOPHEN 500 MG/5ML
1000 LIQUID (ML) ORAL ONCE
Refills: 0 | Status: DISCONTINUED | OUTPATIENT
Start: 2025-07-08 | End: 2025-07-08

## 2025-07-08 RX ORDER — METOCLOPRAMIDE HCL 10 MG
10 TABLET ORAL ONCE
Refills: 0 | Status: COMPLETED | OUTPATIENT
Start: 2025-07-08 | End: 2025-07-08

## 2025-07-08 RX ORDER — PHENAZOPYRIDINE HCL 100 MG
200 TABLET ORAL ONCE
Refills: 0 | Status: DISCONTINUED | OUTPATIENT
Start: 2025-07-08 | End: 2025-07-08

## 2025-07-08 RX ORDER — IBUPROFEN 200 MG
1 TABLET ORAL
Qty: 56 | Refills: 0
Start: 2025-07-08 | End: 2025-07-21

## 2025-07-08 RX ORDER — SODIUM CHLORIDE 9 G/1000ML
1000 INJECTION, SOLUTION INTRAVENOUS
Refills: 0 | Status: DISCONTINUED | OUTPATIENT
Start: 2025-07-08 | End: 2025-07-08

## 2025-07-08 RX ORDER — PHENAZOPYRIDINE HCL 100 MG
200 TABLET ORAL ONCE
Refills: 0 | Status: COMPLETED | OUTPATIENT
Start: 2025-07-08 | End: 2025-07-08

## 2025-07-08 RX ORDER — ONDANSETRON HCL/PF 4 MG/2 ML
4 VIAL (ML) INJECTION ONCE
Refills: 0 | Status: DISCONTINUED | OUTPATIENT
Start: 2025-07-08 | End: 2025-07-16

## 2025-07-08 RX ORDER — HYDROMORPHONE/SOD CHLOR,ISO/PF 2 MG/10 ML
0.5 SYRINGE (ML) INJECTION
Refills: 0 | Status: DISCONTINUED | OUTPATIENT
Start: 2025-07-08 | End: 2025-07-08

## 2025-07-08 RX ORDER — ENOXAPARIN SODIUM 100 MG/ML
40 INJECTION SUBCUTANEOUS ONCE
Refills: 0 | Status: COMPLETED | OUTPATIENT
Start: 2025-07-08 | End: 2025-07-08

## 2025-07-08 RX ORDER — ACETAMINOPHEN 500 MG/5ML
1000 LIQUID (ML) ORAL ONCE
Refills: 0 | Status: COMPLETED | OUTPATIENT
Start: 2025-07-08 | End: 2025-07-08

## 2025-07-08 RX ORDER — HYDROMORPHONE/SOD CHLOR,ISO/PF 2 MG/10 ML
1 SYRINGE (ML) INJECTION
Refills: 0 | Status: DISCONTINUED | OUTPATIENT
Start: 2025-07-08 | End: 2025-07-08

## 2025-07-08 RX ADMIN — ENOXAPARIN SODIUM 40 MILLIGRAM(S): 100 INJECTION SUBCUTANEOUS at 07:43

## 2025-07-08 RX ADMIN — Medication 0.5 MILLIGRAM(S): at 16:38

## 2025-07-08 RX ADMIN — Medication 10 MILLIGRAM(S): at 21:28

## 2025-07-08 RX ADMIN — Medication 0.5 MILLIGRAM(S): at 16:35

## 2025-07-08 RX ADMIN — Medication 200 MILLIGRAM(S): at 07:42

## 2025-07-08 RX ADMIN — Medication 0.5 MILLIGRAM(S): at 16:26

## 2025-07-08 RX ADMIN — Medication 400 MILLIGRAM(S): at 21:27

## 2025-07-08 RX ADMIN — Medication 0.5 MILLIGRAM(S): at 16:50

## 2025-07-08 RX ADMIN — Medication 4 MILLIGRAM(S): at 19:41

## 2025-07-08 NOTE — ASU DISCHARGE PLAN (ADULT/PEDIATRIC) - FINANCIAL ASSISTANCE
[facility] provides services at a reduced cost to those who are determined to be eligible through [facility]’s financial assistance program. Information regarding [facility]’s financial assistance program can be found by going to [email or web address] or by calling [phone].

## 2025-07-08 NOTE — ASU DISCHARGE PLAN (ADULT/PEDIATRIC) - CARE PROVIDER_API CALL
Danica Owusu  Urogynecology and Reconstructive Pelvic Surgery  [address] [place] [address]  Phone: [phone]  Fax: [phone]  Follow Up Time:

## 2025-07-08 NOTE — CHART NOTE - NSCHARTNOTEFT_GEN_A_CORE
PACU ANESTHESIA ADMISSION NOTE      Procedure: Robot-assisted laparoscopic hysterectomy with cystoscopy    Laparoscopic salpingo-oophorectomy, right    Sacrocolpopexy, using mesh    Robot-assisted lysis of intestinal adhesions      Post op diagnosis:  Incomplete uterovaginal prolapse    Pelvic adhesions      _x___  Patent Airway    _x___  Full return of protective reflexes    _x___  Full recovery from anesthesia / back to baseline     Vitals:   T:    37.3       R:     15             BP:     131/74             Sat:         98          P: 84      Mental Status:  _x___ Awake   _____ Alert   _____ Drowsy   _____ Sedated    Nausea/Vomiting:  __x__ NO  ______Yes,   See Post - Op Orders          Pain Scale (0-10):  _____    Treatment: ___x_ None    ____ See Post - Op/PCA Orders    Post - Operative Fluids:   ____ Oral   __x__ See Post - Op Orders    Plan: Discharge:   __x__Home       _____Floor     _____Critical Care    _____  Other:_________________    Comments:  No anesthesia complications noted

## 2025-07-08 NOTE — ASU DISCHARGE PLAN (ADULT/PEDIATRIC) - ASU DC SPECIAL INSTRUCTIONSFT
DIET  - You may resume your normal diet. Eat a well-balanced diet. You may prefer to eat light meals for the first few days after surgery.  Drink plenty of water (6-8 glasses a day).    - Please take Miralax.     ACTIVITY:   - No heavy lifting/pushing/pulling for 6 weeks. Do not lift anything more than 10 lbs (such as laundry, groceries, children, pets), vacuum, push heavy doors or grocery carts, etc, for 6 weeks.  - You may climb stairs as tolerated.  -  Do not put anything in the vagina for at least 6 weeks after surgery unless otherwise instructed by your doctor (including tampons, douching, sexual intercourse, etc).  -  No driving for 1 week after surgery and not while taking narcotic pain medication. Drive defensively when you are ready.  -  Avoid sitting or lying in bed for more than 2 hours at a time while you are awake to reduce your risk of blood clots.    WOUND CARE: You have 6 incisions that are covered with tegaderm (clear tape) . Take the tegaderm off in 1-2 days. After 24 hours you may get your incisions wet.  Do not submerge in water (no tub baths or pools), showering is OK.  Do not apply any ointments, lotions, creams or tape over the Dermabond.    PAIN MANAGEMENT:   Alternate Tylenol and ibuprofen/Motrin (if you are eligible). Each of these medications can be taken every six hours. Try to stagger them so that you are taking something for pain every three hours (ex. Take Motrin at 12:00, Tylenol at 3:00, Motrin at 6:00, etc.) to maximize pain relief.  - Tylenol – 975mg every 6 hours as needed. The maximum dose of Tylenol is 4000 mg in 24 hours.  - Motrin/Ibuprofen - 600 mg every 6 hours as needed (try to take with food). The maximum dose of Motrin/ibuprofen is 2400mg in 24 hours  - Oxycodone 5mg every 6 hours as needed for severe pain (limit use as this is a narcotic and can cause sedation, nausea and constipation.  -Continue your gabapentin.  -  A warm shower, heating pad, and/or walking may help.    WHAT TO EXPECT AT HOME  - Recovery from surgery is generally 2-4 weeks, but sometimes longer for more strenuous activity. It is normal to be very tired during this time.  - It is normal to have some drainage or a small amount of vaginal bleeding after surgery that would require the use of a light pantiliner. This discharge may last up to 6 weeks. The bleeding and discharge should be light and should have no odor.  - You may experience gas pain, abdominal swelling, or shoulder pain for 24-72 hours after surgery. This is from the carbon dioxide gas put into your abdomen to better visualize your organs. A warm shower, heating pad, and/or walking may help.    WHEN TO CALL YOUR DOCTOR:  - Fever (>100.4°F or 38.0°C) or chills  - Incision problems such as redness, warmth, swelling, or foul smelling drainage.  - Severe nausea or persistent vomiting.  - Bright red vaginal bleeding (soaking >1 pad/hour) or foul smelling vaginal drainage.  - Severe pain not relieved with pain medication.  - Pain with urination, cloudy urine, or foul smelling urine.  - Or if you have any other problems or questions.

## 2025-07-08 NOTE — BRIEF OPERATIVE NOTE - NSICDXBRIEFPOSTOP_GEN_ALL_CORE_FT
POST-OP DIAGNOSIS:  Incomplete uterovaginal prolapse 08-Jul-2025 13:49:24  Danica Owusu  Pelvic adhesions 08-Jul-2025 13:49:33  Danica Owusu

## 2025-07-08 NOTE — ASU DISCHARGE PLAN (ADULT/PEDIATRIC) - PROCEDURE
Exam under anesthesia, robotic assisted total laparoscopic hysterectomy, right salpingo oopherectomy, sacrocolpopexy, cystoscopy

## 2025-07-08 NOTE — BRIEF OPERATIVE NOTE - NSICDXBRIEFPROCEDURE_GEN_ALL_CORE_FT
PROCEDURES:  Robot-assisted laparoscopic hysterectomy with cystoscopy 08-Jul-2025 13:42:51  Danica Owusu  Laparoscopic salpingo-oophorectomy, right 08-Jul-2025 13:43:02  Danica Owusu  Sacrocolpopexy, using mesh 08-Jul-2025 13:43:12  Danica Owusu  Robot-assisted lysis of intestinal adhesions 08-Jul-2025 13:43:35  Danica Owusu

## 2025-07-09 RX ORDER — ACETAMINOPHEN 500 MG/5ML
3 LIQUID (ML) ORAL
Qty: 168 | Refills: 0
Start: 2025-07-09 | End: 2025-07-22

## 2025-07-09 RX ORDER — POLYETHYLENE GLYCOL 3350 17 G/17G
17 POWDER, FOR SOLUTION ORAL
Qty: 1 | Refills: 0
Start: 2025-07-09 | End: 2025-08-07

## 2025-07-09 RX ORDER — OXYCODONE HYDROCHLORIDE 30 MG/1
1 TABLET ORAL
Qty: 12 | Refills: 0
Start: 2025-07-09 | End: 2025-07-11

## 2025-07-10 LAB — SURGICAL PATHOLOGY STUDY: SIGNIFICANT CHANGE UP

## 2025-07-11 ENCOUNTER — APPOINTMENT (OUTPATIENT)
Dept: UROGYNECOLOGY | Facility: CLINIC | Age: 60
End: 2025-07-11
Payer: COMMERCIAL

## 2025-07-11 VITALS
SYSTOLIC BLOOD PRESSURE: 113 MMHG | DIASTOLIC BLOOD PRESSURE: 74 MMHG | BODY MASS INDEX: 24.8 KG/M2 | WEIGHT: 140 LBS | HEIGHT: 63 IN | HEART RATE: 88 BPM

## 2025-07-11 DIAGNOSIS — Z88.1 ALLERGY STATUS TO OTHER ANTIBIOTIC AGENTS: ICD-10-CM

## 2025-07-11 DIAGNOSIS — N81.2 INCOMPLETE UTEROVAGINAL PROLAPSE: ICD-10-CM

## 2025-07-11 DIAGNOSIS — N72 INFLAMMATORY DISEASE OF CERVIX UTERI: ICD-10-CM

## 2025-07-11 DIAGNOSIS — N73.6 FEMALE PELVIC PERITONEAL ADHESIONS (POSTINFECTIVE): ICD-10-CM

## 2025-07-11 DIAGNOSIS — Z88.0 ALLERGY STATUS TO PENICILLIN: ICD-10-CM

## 2025-07-11 DIAGNOSIS — N80.03 ADENOMYOSIS OF THE UTERUS: ICD-10-CM

## 2025-07-11 DIAGNOSIS — N83.291 OTHER OVARIAN CYST, RIGHT SIDE: ICD-10-CM

## 2025-07-11 DIAGNOSIS — Z88.2 ALLERGY STATUS TO SULFONAMIDES: ICD-10-CM

## 2025-07-11 PROCEDURE — 99024 POSTOP FOLLOW-UP VISIT: CPT

## 2025-07-11 PROCEDURE — 51700 IRRIGATION OF BLADDER: CPT | Mod: 58

## 2025-07-16 RX ORDER — IBUPROFEN 600 MG/1
600 TABLET, FILM COATED ORAL
Qty: 30 | Refills: 0 | Status: ACTIVE | COMMUNITY
Start: 2025-07-16 | End: 1900-01-01

## 2025-07-24 ENCOUNTER — LABORATORY RESULT (OUTPATIENT)
Age: 60
End: 2025-07-24

## 2025-07-24 ENCOUNTER — APPOINTMENT (OUTPATIENT)
Dept: UROGYNECOLOGY | Facility: CLINIC | Age: 60
End: 2025-07-24
Payer: COMMERCIAL

## 2025-07-24 VITALS
DIASTOLIC BLOOD PRESSURE: 75 MMHG | HEIGHT: 63 IN | SYSTOLIC BLOOD PRESSURE: 107 MMHG | WEIGHT: 140 LBS | HEART RATE: 80 BPM | BODY MASS INDEX: 24.8 KG/M2

## 2025-07-24 DIAGNOSIS — N81.2 INCOMPLETE UTEROVAGINAL PROLAPSE: ICD-10-CM

## 2025-07-24 PROCEDURE — 99024 POSTOP FOLLOW-UP VISIT: CPT

## 2025-07-28 DIAGNOSIS — N39.0 URINARY TRACT INFECTION, SITE NOT SPECIFIED: ICD-10-CM

## 2025-07-28 RX ORDER — SULFAMETHOXAZOLE AND TRIMETHOPRIM 800; 160 MG/1; MG/1
800-160 TABLET ORAL
Qty: 10 | Refills: 0 | Status: COMPLETED | COMMUNITY
Start: 2025-07-28 | End: 2025-08-02

## 2025-08-01 ENCOUNTER — RX RENEWAL (OUTPATIENT)
Age: 60
End: 2025-08-01

## 2025-08-11 ENCOUNTER — EMERGENCY (EMERGENCY)
Facility: HOSPITAL | Age: 60
LOS: 0 days | Discharge: HOME | End: 2025-08-11
Attending: EMERGENCY MEDICINE
Payer: COMMERCIAL

## 2025-08-11 VITALS
SYSTOLIC BLOOD PRESSURE: 128 MMHG | TEMPERATURE: 98 F | HEART RATE: 117 BPM | OXYGEN SATURATION: 99 % | HEIGHT: 63 IN | WEIGHT: 160.06 LBS | RESPIRATION RATE: 18 BRPM | DIASTOLIC BLOOD PRESSURE: 79 MMHG

## 2025-08-11 VITALS — HEART RATE: 111 BPM

## 2025-08-11 DIAGNOSIS — L72.3 SEBACEOUS CYST: Chronic | ICD-10-CM

## 2025-08-11 DIAGNOSIS — Z98.890 OTHER SPECIFIED POSTPROCEDURAL STATES: Chronic | ICD-10-CM

## 2025-08-11 DIAGNOSIS — R11.2 NAUSEA WITH VOMITING, UNSPECIFIED: ICD-10-CM

## 2025-08-11 DIAGNOSIS — Z88.6 ALLERGY STATUS TO ANALGESIC AGENT: ICD-10-CM

## 2025-08-11 DIAGNOSIS — Z91.018 ALLERGY TO OTHER FOODS: ICD-10-CM

## 2025-08-11 DIAGNOSIS — H55.00 UNSPECIFIED NYSTAGMUS: ICD-10-CM

## 2025-08-11 DIAGNOSIS — Z88.1 ALLERGY STATUS TO OTHER ANTIBIOTIC AGENTS: ICD-10-CM

## 2025-08-11 DIAGNOSIS — I67.1 CEREBRAL ANEURYSM, NONRUPTURED: ICD-10-CM

## 2025-08-11 DIAGNOSIS — Z88.0 ALLERGY STATUS TO PENICILLIN: ICD-10-CM

## 2025-08-11 LAB
ALBUMIN SERPL ELPH-MCNC: 4.6 G/DL — SIGNIFICANT CHANGE UP (ref 3.5–5.2)
ALP SERPL-CCNC: 99 U/L — SIGNIFICANT CHANGE UP (ref 30–115)
ALT FLD-CCNC: 13 U/L — SIGNIFICANT CHANGE UP (ref 0–41)
ANION GAP SERPL CALC-SCNC: 16 MMOL/L — HIGH (ref 7–14)
AST SERPL-CCNC: 23 U/L — SIGNIFICANT CHANGE UP (ref 0–41)
BASOPHILS # BLD AUTO: 0.02 K/UL — SIGNIFICANT CHANGE UP (ref 0–0.2)
BASOPHILS NFR BLD AUTO: 0.3 % — SIGNIFICANT CHANGE UP (ref 0–2)
BILIRUB SERPL-MCNC: 0.8 MG/DL — SIGNIFICANT CHANGE UP (ref 0.2–1.2)
BUN SERPL-MCNC: 27 MG/DL — HIGH (ref 10–20)
CALCIUM SERPL-MCNC: 9.7 MG/DL — SIGNIFICANT CHANGE UP (ref 8.4–10.5)
CHLORIDE SERPL-SCNC: 99 MMOL/L — SIGNIFICANT CHANGE UP (ref 98–110)
CO2 SERPL-SCNC: 24 MMOL/L — SIGNIFICANT CHANGE UP (ref 17–32)
CREAT SERPL-MCNC: 0.6 MG/DL — LOW (ref 0.7–1.5)
EGFR: 103 ML/MIN/1.73M2 — SIGNIFICANT CHANGE UP
EGFR: 103 ML/MIN/1.73M2 — SIGNIFICANT CHANGE UP
EOSINOPHIL # BLD AUTO: 0.07 K/UL — SIGNIFICANT CHANGE UP (ref 0–0.5)
EOSINOPHIL NFR BLD AUTO: 1 % — SIGNIFICANT CHANGE UP (ref 0–6)
GLUCOSE SERPL-MCNC: 85 MG/DL — SIGNIFICANT CHANGE UP (ref 70–99)
HCT VFR BLD CALC: 44.5 % — SIGNIFICANT CHANGE UP (ref 34.5–45)
HGB BLD-MCNC: 15 G/DL — SIGNIFICANT CHANGE UP (ref 11.5–15.5)
IMM GRANULOCYTES # BLD AUTO: 0.02 K/UL — SIGNIFICANT CHANGE UP (ref 0–0.07)
IMM GRANULOCYTES NFR BLD AUTO: 0.3 % — SIGNIFICANT CHANGE UP (ref 0–0.9)
LYMPHOCYTES # BLD AUTO: 0.89 K/UL — LOW (ref 1–3.3)
LYMPHOCYTES NFR BLD AUTO: 12.6 % — LOW (ref 13–44)
MCHC RBC-ENTMCNC: 30.8 PG — SIGNIFICANT CHANGE UP (ref 27–34)
MCHC RBC-ENTMCNC: 33.7 G/DL — SIGNIFICANT CHANGE UP (ref 32–36)
MCV RBC AUTO: 91.4 FL — SIGNIFICANT CHANGE UP (ref 80–100)
MONOCYTES # BLD AUTO: 0.59 K/UL — SIGNIFICANT CHANGE UP (ref 0–0.9)
MONOCYTES NFR BLD AUTO: 8.3 % — SIGNIFICANT CHANGE UP (ref 2–14)
NEUTROPHILS # BLD AUTO: 5.5 K/UL — SIGNIFICANT CHANGE UP (ref 1.8–7.4)
NEUTROPHILS NFR BLD AUTO: 77.5 % — HIGH (ref 43–77)
NRBC # BLD AUTO: 0 K/UL — SIGNIFICANT CHANGE UP (ref 0–0)
NRBC # FLD: 0 K/UL — SIGNIFICANT CHANGE UP (ref 0–0)
NRBC BLD AUTO-RTO: 0 /100 WBCS — SIGNIFICANT CHANGE UP (ref 0–0)
PLATELET # BLD AUTO: 150 K/UL — SIGNIFICANT CHANGE UP (ref 150–400)
PMV BLD: 12 FL — SIGNIFICANT CHANGE UP (ref 7–13)
POTASSIUM SERPL-MCNC: 4.1 MMOL/L — SIGNIFICANT CHANGE UP (ref 3.5–5)
POTASSIUM SERPL-SCNC: 4.1 MMOL/L — SIGNIFICANT CHANGE UP (ref 3.5–5)
PROT SERPL-MCNC: 7.9 G/DL — SIGNIFICANT CHANGE UP (ref 6–8)
RBC # BLD: 4.87 M/UL — SIGNIFICANT CHANGE UP (ref 3.8–5.2)
RBC # FLD: 12.6 % — SIGNIFICANT CHANGE UP (ref 10.3–14.5)
SODIUM SERPL-SCNC: 139 MMOL/L — SIGNIFICANT CHANGE UP (ref 135–146)
WBC # BLD: 7.09 K/UL — SIGNIFICANT CHANGE UP (ref 3.8–10.5)
WBC # FLD AUTO: 7.09 K/UL — SIGNIFICANT CHANGE UP (ref 3.8–10.5)

## 2025-08-11 PROCEDURE — 99285 EMERGENCY DEPT VISIT HI MDM: CPT

## 2025-08-11 PROCEDURE — 85025 COMPLETE CBC W/AUTO DIFF WBC: CPT

## 2025-08-11 PROCEDURE — 70496 CT ANGIOGRAPHY HEAD: CPT

## 2025-08-11 PROCEDURE — 80053 COMPREHEN METABOLIC PANEL: CPT

## 2025-08-11 PROCEDURE — 70496 CT ANGIOGRAPHY HEAD: CPT | Mod: 26

## 2025-08-11 PROCEDURE — 70498 CT ANGIOGRAPHY NECK: CPT

## 2025-08-11 PROCEDURE — 99284 EMERGENCY DEPT VISIT MOD MDM: CPT | Mod: 25

## 2025-08-11 PROCEDURE — 70498 CT ANGIOGRAPHY NECK: CPT | Mod: 26

## 2025-08-11 PROCEDURE — 70450 CT HEAD/BRAIN W/O DYE: CPT

## 2025-08-11 PROCEDURE — 36415 COLL VENOUS BLD VENIPUNCTURE: CPT

## 2025-08-11 PROCEDURE — 82962 GLUCOSE BLOOD TEST: CPT

## 2025-08-11 PROCEDURE — 96374 THER/PROPH/DIAG INJ IV PUSH: CPT | Mod: XU

## 2025-08-11 PROCEDURE — 70450 CT HEAD/BRAIN W/O DYE: CPT | Mod: 26,59

## 2025-08-11 RX ORDER — MECLIZINE HCL 12.5 MG
1 TABLET ORAL
Qty: 10 | Refills: 0
Start: 2025-08-11 | End: 2025-08-15

## 2025-08-11 RX ORDER — MECLIZINE HCL 12.5 MG
50 TABLET ORAL ONCE
Refills: 0 | Status: COMPLETED | OUTPATIENT
Start: 2025-08-11 | End: 2025-08-11

## 2025-08-11 RX ORDER — SODIUM CHLORIDE 9 G/1000ML
1000 INJECTION, SOLUTION INTRAVENOUS ONCE
Refills: 0 | Status: COMPLETED | OUTPATIENT
Start: 2025-08-11 | End: 2025-08-11

## 2025-08-11 RX ORDER — METOCLOPRAMIDE HCL 10 MG
1 TABLET ORAL
Qty: 28 | Refills: 0
Start: 2025-08-11

## 2025-08-11 RX ORDER — ONDANSETRON HCL/PF 4 MG/2 ML
1 VIAL (ML) INJECTION
Qty: 1 | Refills: 0
Start: 2025-08-11 | End: 2025-08-13

## 2025-08-11 RX ORDER — ONDANSETRON HCL/PF 4 MG/2 ML
4 VIAL (ML) INJECTION ONCE
Refills: 0 | Status: COMPLETED | OUTPATIENT
Start: 2025-08-11 | End: 2025-08-11

## 2025-08-11 RX ADMIN — Medication 4 MILLIGRAM(S): at 12:55

## 2025-08-11 RX ADMIN — SODIUM CHLORIDE 1000 MILLILITER(S): 9 INJECTION, SOLUTION INTRAVENOUS at 12:55

## 2025-08-11 RX ADMIN — Medication 50 MILLIGRAM(S): at 10:57

## 2025-09-11 ENCOUNTER — APPOINTMENT (OUTPATIENT)
Dept: UROGYNECOLOGY | Facility: CLINIC | Age: 60
End: 2025-09-11
Payer: COMMERCIAL

## 2025-09-11 VITALS
HEART RATE: 98 BPM | BODY MASS INDEX: 24.8 KG/M2 | SYSTOLIC BLOOD PRESSURE: 112 MMHG | HEIGHT: 63 IN | WEIGHT: 140 LBS | DIASTOLIC BLOOD PRESSURE: 80 MMHG

## 2025-09-11 DIAGNOSIS — N39.46 MIXED INCONTINENCE: ICD-10-CM

## 2025-09-11 DIAGNOSIS — Z87.42 PERSONAL HISTORY OF OTHER DISEASES OF THE FEMALE GENITAL TRACT: ICD-10-CM

## 2025-09-11 DIAGNOSIS — N81.2 INCOMPLETE UTEROVAGINAL PROLAPSE: ICD-10-CM

## 2025-09-11 DIAGNOSIS — N39.0 URINARY TRACT INFECTION, SITE NOT SPECIFIED: ICD-10-CM

## 2025-09-11 DIAGNOSIS — Z87.19 PERSONAL HISTORY OF OTHER DISEASES OF THE DIGESTIVE SYSTEM: ICD-10-CM

## 2025-09-11 DIAGNOSIS — M79.18 MYALGIA, OTHER SITE: ICD-10-CM

## 2025-09-11 PROCEDURE — 99024 POSTOP FOLLOW-UP VISIT: CPT

## 2025-09-17 RX ORDER — SULFAMETHOXAZOLE AND TRIMETHOPRIM 800; 160 MG/1; MG/1
800-160 TABLET ORAL
Qty: 6 | Refills: 0 | Status: ACTIVE | COMMUNITY
Start: 2025-09-17 | End: 1900-01-01